# Patient Record
Sex: FEMALE | Race: WHITE | NOT HISPANIC OR LATINO | Employment: OTHER | URBAN - METROPOLITAN AREA
[De-identification: names, ages, dates, MRNs, and addresses within clinical notes are randomized per-mention and may not be internally consistent; named-entity substitution may affect disease eponyms.]

---

## 2024-01-23 ENCOUNTER — HOSPITAL ENCOUNTER (EMERGENCY)
Facility: HOSPITAL | Age: 74
Discharge: HOME | End: 2024-01-23
Payer: MEDICARE

## 2024-01-23 VITALS
RESPIRATION RATE: 18 BRPM | HEART RATE: 101 BPM | DIASTOLIC BLOOD PRESSURE: 61 MMHG | TEMPERATURE: 98 F | SYSTOLIC BLOOD PRESSURE: 157 MMHG

## 2024-01-23 DIAGNOSIS — R06.02 SHORTNESS OF BREATH: ICD-10-CM

## 2024-01-23 PROCEDURE — 99900041 HC LEFT WITHOUT BEING SEEN- EMERGENCY

## 2024-07-13 ENCOUNTER — HOSPITAL ENCOUNTER (INPATIENT)
Facility: HOSPITAL | Age: 74
LOS: 13 days | Discharge: NURSING, REHAB OR LONG-TERM CARE | DRG: 242 | End: 2024-07-26
Attending: EMERGENCY MEDICINE | Admitting: HOSPITALIST
Payer: MEDICARE

## 2024-07-13 DIAGNOSIS — I50.9 CHF (CONGESTIVE HEART FAILURE): ICD-10-CM

## 2024-07-13 DIAGNOSIS — I45.89 AV DISSOCIATION: ICD-10-CM

## 2024-07-13 DIAGNOSIS — I44.1 AV BLOCK, MOBITZ II: ICD-10-CM

## 2024-07-13 DIAGNOSIS — R06.02 SOB (SHORTNESS OF BREATH): Primary | ICD-10-CM

## 2024-07-13 DIAGNOSIS — I10 PRIMARY HYPERTENSION: ICD-10-CM

## 2024-07-13 DIAGNOSIS — I50.22 HEART FAILURE WITH MILDLY REDUCED EJECTION FRACTION (HFMREF): ICD-10-CM

## 2024-07-13 DIAGNOSIS — I50.41 ACUTE COMBINED SYSTOLIC AND DIASTOLIC CONGESTIVE HEART FAILURE: ICD-10-CM

## 2024-07-13 DIAGNOSIS — I50.43 ACUTE ON CHRONIC COMBINED SYSTOLIC AND DIASTOLIC CONGESTIVE HEART FAILURE: ICD-10-CM

## 2024-07-13 DIAGNOSIS — I50.9 CONGESTIVE HEART FAILURE, UNSPECIFIED HF CHRONICITY, UNSPECIFIED HEART FAILURE TYPE: ICD-10-CM

## 2024-07-13 DIAGNOSIS — R00.1 BRADYCARDIA: ICD-10-CM

## 2024-07-13 DIAGNOSIS — I50.9 ACUTE ON CHRONIC CONGESTIVE HEART FAILURE, UNSPECIFIED HEART FAILURE TYPE: ICD-10-CM

## 2024-07-13 DIAGNOSIS — R09.02 HYPOXIA: ICD-10-CM

## 2024-07-13 DIAGNOSIS — R79.89 ELEVATED TROPONIN: ICD-10-CM

## 2024-07-13 DIAGNOSIS — I49.9 ARRHYTHMIA: ICD-10-CM

## 2024-07-13 LAB
ALBUMIN SERPL BCP-MCNC: 3.5 G/DL (ref 3.5–5.2)
ALP SERPL-CCNC: 132 U/L (ref 55–135)
ALT SERPL W/O P-5'-P-CCNC: 47 U/L (ref 10–44)
ANION GAP SERPL CALC-SCNC: 12 MMOL/L (ref 8–16)
AST SERPL-CCNC: 80 U/L (ref 10–40)
BACTERIA #/AREA URNS HPF: NORMAL /HPF
BASOPHILS # BLD AUTO: 0.05 K/UL (ref 0–0.2)
BASOPHILS NFR BLD: 0.8 % (ref 0–1.9)
BILIRUB SERPL-MCNC: 1.3 MG/DL (ref 0.1–1)
BILIRUB UR QL STRIP: NEGATIVE
BNP SERPL-MCNC: 2367 PG/ML (ref 0–99)
BUN SERPL-MCNC: 32 MG/DL (ref 8–23)
CALCIUM SERPL-MCNC: 9.1 MG/DL (ref 8.7–10.5)
CHLORIDE SERPL-SCNC: 111 MMOL/L (ref 95–110)
CLARITY UR: CLEAR
CO2 SERPL-SCNC: 19 MMOL/L (ref 23–29)
COLOR UR: COLORLESS
CREAT SERPL-MCNC: 1 MG/DL (ref 0.5–1.4)
D DIMER PPP IA.FEU-MCNC: 1.3 MG/L FEU
DIFFERENTIAL METHOD BLD: ABNORMAL
EOSINOPHIL # BLD AUTO: 0.1 K/UL (ref 0–0.5)
EOSINOPHIL NFR BLD: 1.3 % (ref 0–8)
ERYTHROCYTE [DISTWIDTH] IN BLOOD BY AUTOMATED COUNT: 13 % (ref 11.5–14.5)
EST. GFR  (NO RACE VARIABLE): 59 ML/MIN/1.73 M^2
GLUCOSE SERPL-MCNC: 191 MG/DL (ref 70–110)
GLUCOSE UR QL STRIP: NEGATIVE
HCT VFR BLD AUTO: 40.2 % (ref 37–48.5)
HGB BLD-MCNC: 13.5 G/DL (ref 12–16)
HGB UR QL STRIP: ABNORMAL
HYALINE CASTS #/AREA URNS LPF: 0 /LPF
IMM GRANULOCYTES # BLD AUTO: 0.03 K/UL (ref 0–0.04)
IMM GRANULOCYTES NFR BLD AUTO: 0.5 % (ref 0–0.5)
INFLUENZA A, MOLECULAR: NEGATIVE
INFLUENZA B, MOLECULAR: NEGATIVE
KETONES UR QL STRIP: NEGATIVE
LACTATE SERPL-SCNC: 2.2 MMOL/L (ref 0.5–2.2)
LEUKOCYTE ESTERASE UR QL STRIP: NEGATIVE
LYMPHOCYTES # BLD AUTO: 1.7 K/UL (ref 1–4.8)
LYMPHOCYTES NFR BLD: 27.4 % (ref 18–48)
MAGNESIUM SERPL-MCNC: 2.3 MG/DL (ref 1.6–2.6)
MCH RBC QN AUTO: 32.7 PG (ref 27–31)
MCHC RBC AUTO-ENTMCNC: 33.6 G/DL (ref 32–36)
MCV RBC AUTO: 97 FL (ref 82–98)
MICROSCOPIC COMMENT: NORMAL
MONOCYTES # BLD AUTO: 0.4 K/UL (ref 0.3–1)
MONOCYTES NFR BLD: 6.7 % (ref 4–15)
NEUTROPHILS # BLD AUTO: 3.9 K/UL (ref 1.8–7.7)
NEUTROPHILS NFR BLD: 63.3 % (ref 38–73)
NITRITE UR QL STRIP: NEGATIVE
NRBC BLD-RTO: 0 /100 WBC
PH UR STRIP: 5 [PH] (ref 5–8)
PHOSPHATE SERPL-MCNC: 5.7 MG/DL (ref 2.7–4.5)
PLATELET # BLD AUTO: 187 K/UL (ref 150–450)
PMV BLD AUTO: 10.8 FL (ref 9.2–12.9)
POTASSIUM SERPL-SCNC: 4.1 MMOL/L (ref 3.5–5.1)
PROT SERPL-MCNC: 6.5 G/DL (ref 6–8.4)
PROT UR QL STRIP: ABNORMAL
RBC # BLD AUTO: 4.13 M/UL (ref 4–5.4)
RBC #/AREA URNS HPF: 0 /HPF (ref 0–4)
SARS-COV-2 RDRP RESP QL NAA+PROBE: NEGATIVE
SODIUM SERPL-SCNC: 142 MMOL/L (ref 136–145)
SP GR UR STRIP: 1.01 (ref 1–1.03)
SPECIMEN SOURCE: NORMAL
TROPONIN I SERPL DL<=0.01 NG/ML-MCNC: 0.09 NG/ML (ref 0–0.03)
URN SPEC COLLECT METH UR: ABNORMAL
UROBILINOGEN UR STRIP-ACNC: NEGATIVE EU/DL
WBC # BLD AUTO: 6.1 K/UL (ref 3.9–12.7)
WBC #/AREA URNS HPF: 0 /HPF (ref 0–5)

## 2024-07-13 PROCEDURE — 93005 ELECTROCARDIOGRAM TRACING: CPT

## 2024-07-13 PROCEDURE — 87040 BLOOD CULTURE FOR BACTERIA: CPT | Mod: 59 | Performed by: EMERGENCY MEDICINE

## 2024-07-13 PROCEDURE — 83735 ASSAY OF MAGNESIUM: CPT | Performed by: EMERGENCY MEDICINE

## 2024-07-13 PROCEDURE — 84100 ASSAY OF PHOSPHORUS: CPT | Performed by: EMERGENCY MEDICINE

## 2024-07-13 PROCEDURE — 27000221 HC OXYGEN, UP TO 24 HOURS

## 2024-07-13 PROCEDURE — 84484 ASSAY OF TROPONIN QUANT: CPT | Performed by: EMERGENCY MEDICINE

## 2024-07-13 PROCEDURE — 96374 THER/PROPH/DIAG INJ IV PUSH: CPT

## 2024-07-13 PROCEDURE — 93010 ELECTROCARDIOGRAM REPORT: CPT | Mod: ,,, | Performed by: INTERNAL MEDICINE

## 2024-07-13 PROCEDURE — 80053 COMPREHEN METABOLIC PANEL: CPT | Performed by: EMERGENCY MEDICINE

## 2024-07-13 PROCEDURE — 87502 INFLUENZA DNA AMP PROBE: CPT | Performed by: EMERGENCY MEDICINE

## 2024-07-13 PROCEDURE — 83605 ASSAY OF LACTIC ACID: CPT | Performed by: EMERGENCY MEDICINE

## 2024-07-13 PROCEDURE — 83880 ASSAY OF NATRIURETIC PEPTIDE: CPT | Performed by: EMERGENCY MEDICINE

## 2024-07-13 PROCEDURE — 85379 FIBRIN DEGRADATION QUANT: CPT | Performed by: EMERGENCY MEDICINE

## 2024-07-13 PROCEDURE — 81000 URINALYSIS NONAUTO W/SCOPE: CPT | Performed by: EMERGENCY MEDICINE

## 2024-07-13 PROCEDURE — U0002 COVID-19 LAB TEST NON-CDC: HCPCS | Performed by: EMERGENCY MEDICINE

## 2024-07-13 PROCEDURE — 63600175 PHARM REV CODE 636 W HCPCS: Performed by: EMERGENCY MEDICINE

## 2024-07-13 PROCEDURE — 25500020 PHARM REV CODE 255: Performed by: EMERGENCY MEDICINE

## 2024-07-13 PROCEDURE — 11000001 HC ACUTE MED/SURG PRIVATE ROOM

## 2024-07-13 PROCEDURE — 99900035 HC TECH TIME PER 15 MIN (STAT)

## 2024-07-13 PROCEDURE — 85025 COMPLETE CBC W/AUTO DIFF WBC: CPT | Performed by: EMERGENCY MEDICINE

## 2024-07-13 PROCEDURE — 99291 CRITICAL CARE FIRST HOUR: CPT

## 2024-07-13 RX ORDER — POLYETHYLENE GLYCOL 3350 17 G/17G
17 POWDER, FOR SOLUTION ORAL DAILY
Status: DISCONTINUED | OUTPATIENT
Start: 2024-07-14 | End: 2024-07-26 | Stop reason: HOSPADM

## 2024-07-13 RX ORDER — HYDROCHLOROTHIAZIDE 12.5 MG/1
1 CAPSULE ORAL EVERY MORNING
Status: ON HOLD | COMMUNITY
Start: 2024-06-27 | End: 2024-07-26 | Stop reason: HOSPADM

## 2024-07-13 RX ORDER — ENOXAPARIN SODIUM 100 MG/ML
40 INJECTION SUBCUTANEOUS EVERY 24 HOURS
Status: DISCONTINUED | OUTPATIENT
Start: 2024-07-14 | End: 2024-07-24

## 2024-07-13 RX ORDER — FUROSEMIDE 10 MG/ML
40 INJECTION INTRAMUSCULAR; INTRAVENOUS
Status: DISCONTINUED | OUTPATIENT
Start: 2024-07-13 | End: 2024-07-13

## 2024-07-13 RX ORDER — SODIUM CHLORIDE 0.9 % (FLUSH) 0.9 %
10 SYRINGE (ML) INJECTION
Status: DISCONTINUED | OUTPATIENT
Start: 2024-07-14 | End: 2024-07-26 | Stop reason: HOSPADM

## 2024-07-13 RX ORDER — AMOXICILLIN 250 MG
1 CAPSULE ORAL 2 TIMES DAILY
Status: DISCONTINUED | OUTPATIENT
Start: 2024-07-14 | End: 2024-07-26 | Stop reason: HOSPADM

## 2024-07-13 RX ORDER — FUROSEMIDE 10 MG/ML
40 INJECTION INTRAMUSCULAR; INTRAVENOUS
Status: COMPLETED | OUTPATIENT
Start: 2024-07-13 | End: 2024-07-13

## 2024-07-13 RX ORDER — ONDANSETRON HYDROCHLORIDE 2 MG/ML
4 INJECTION, SOLUTION INTRAVENOUS EVERY 8 HOURS PRN
Status: DISCONTINUED | OUTPATIENT
Start: 2024-07-14 | End: 2024-07-26 | Stop reason: HOSPADM

## 2024-07-13 RX ADMIN — FUROSEMIDE 40 MG: 10 INJECTION, SOLUTION INTRAMUSCULAR; INTRAVENOUS at 06:07

## 2024-07-13 RX ADMIN — IOHEXOL 100 ML: 350 INJECTION, SOLUTION INTRAVENOUS at 07:07

## 2024-07-13 NOTE — Clinical Note
32 ml of contrast were injected throughout the case. 0 mL of contrast was the total wasted during the case. 32 mL was the total amount used during the case.

## 2024-07-13 NOTE — ED PROVIDER NOTES
SCRIBE #1 NOTE: I, Amairani Navarro, am scribing for, and in the presence of, John Dalton MD. I have scribed the entire note.       History     Chief Complaint   Patient presents with    Shortness of Breath     Pt with a hx of pulmonary edema and unspecified cardiac hx arrived in the ED via [facility] on 15L O2 via NRB with c/o SOB and BLE edema x last couple days. Pt reports swelling has worsened today. +4 pitting edema noted to BLE. Pt denies home O2, CHF, blood thinners, or diuretics. [facility] medic reports FD was the first on scene and pt's O2 saturation was in the 80s on RA.      Review of patient's allergies indicates:  No Known Allergies      History of Present Illness     HPI    7/13/2024, 6:04 PM  History obtained from the patient and [facility]      History of Present Illness: Kaleigh Delgado is a 74 y.o. female patient with a PMHx of HTN, pulmonary edema, and bundle branch block who presents to the Emergency Department for evaluation of SOB and leg swelling which onset gradually two days ago with no hx of CHF. Pt reports sxs worsening PTA and calling [facility]. [facility] reports FD recorded O2 sat 88 on RA and BP as 115/78. On [facility] arrival they gave the pt 6L NC, but then switched to 15L on NRB. Pt denies any home O2, blood thinners, or diuretics. Symptoms are constant and moderate in severity. No mitigating or exacerbating factors reported. Patient denies any fever, CP, n/v, rash, and all other sxs at this time. No further complaints or concerns at this time.       Arrival mode: [facility]    PCP: No, Primary Doctor        Past Medical History:  History reviewed. No pertinent past medical history.    Past Surgical History:  Past Surgical History:   Procedure Laterality Date    exploratory lap      FIRST RIB REMOVAL      KNEE SURGERY      TONSILLECTOMY      VARICOSE VEIN SURGERY           Family History:  Family History   Family history unknown: Yes       Social History:  Social History     Tobacco Use    Smoking status: Never     Smokeless tobacco: Not on file   Substance and Sexual Activity    Alcohol use: No    Drug use: No    Sexual activity: Not on file        Review of Systems     Review of Systems   Constitutional:  Negative for fever.   HENT:  Negative for sore throat.    Respiratory:  Positive for shortness of breath.    Cardiovascular:  Positive for leg swelling (bilateral). Negative for chest pain.   Gastrointestinal:  Negative for nausea and vomiting.   Genitourinary:  Negative for dysuria.   Musculoskeletal:  Negative for back pain.   Skin:  Negative for rash.   Neurological:  Negative for weakness.   Hematological:  Does not bruise/bleed easily.   All other systems reviewed and are negative.       Physical Exam     Initial Vitals   BP Pulse Resp Temp SpO2   07/13/24 1804 07/13/24 1804 07/13/24 1802 07/13/24 1804 07/13/24 1802   115/78 64 (!) 28 98.2 °F (36.8 °C) (!) 93 %      MAP       --                 Physical Exam  Nursing Notes and Vital Signs Reviewed.  Constitutional: Patient is in mild distress. Well-developed and well-nourished.  Head: Atraumatic. Normocephalic.  Eyes: PERRL. EOM intact. Conjunctivae are not pale. No scleral icterus.  ENT: Mucous membranes are moist. Oropharynx is clear and symmetric.    Neck: Supple. Full ROM. No lymphadenopathy.  Cardiovascular: Regular rate. Regular rhythm. No murmurs, rubs, or gallops. Distal pulses are 2+ and symmetric.  Pulmonary/Chest: Diffuse crackles. No wheezing or rales.  Abdominal: Soft and non-distended.  There is no tenderness.  No rebound, guarding, or rigidity. Good bowel sounds.  Genitourinary: No CVA tenderness  Musculoskeletal: Moves all extremities. No obvious deformities. 4+ edema. No calf tenderness.  Skin: Warm and dry.  Neurological:  Alert, awake, and appropriate.  Normal speech.  No acute focal neurological deficits are appreciated.  Psychiatric: Normal affect. Good eye contact. Appropriate in content.     ED Course   Critical Care    Date/Time: 7/13/2024 8:17  "PM    Performed by: oJhn Dalton MD  Authorized by: John Dalton MD  Direct patient critical care time: 15 minutes  Additional history critical care time: 10 minutes  Ordering / reviewing critical care time: 5 minutes  Documentation critical care time: 6 minutes  Consulting other physicians critical care time: 4 minutes  Consult with family critical care time: 2 minutes  Other critical care time: 3 minutes  Total critical care time (exclusive of procedural time) : 45 minutes  Critical care time was exclusive of separately billable procedures and treating other patients and teaching time.  Critical care was necessary to treat or prevent imminent or life-threatening deterioration of the following conditions: Acute hypoxemic respiratory failure.  Critical care was time spent personally by me on the following activities: blood draw for specimens, development of treatment plan with patient or surrogate, discussions with consultants, interpretation of cardiac output measurements, evaluation of patient's response to treatment, examination of patient, obtaining history from patient or surrogate, ordering and performing treatments and interventions, ordering and review of laboratory studies, ordering and review of radiographic studies, pulse oximetry, re-evaluation of patient's condition and review of old charts.        ED Vital Signs:  Vitals:    07/13/24 1802 07/13/24 1804 07/13/24 1814 07/13/24 2043   BP:  115/78  (!) 173/71   Pulse:  64 (!) 49 (!) 42   Resp: (!) 28 (!) 33     Temp:  98.2 °F (36.8 °C)     TempSrc:  Oral     SpO2: (!) 93% 99%  98%   Weight:       Height:        07/13/24 2054 07/13/24 2110 07/13/24 2232 07/13/24 2351   BP:   (!) 182/72    Pulse: (!) 59  (!) 45    Resp: 20  18    Temp: 98.8 °F (37.1 °C)  98.2 °F (36.8 °C)    TempSrc: Oral      SpO2: 96%  98%    Weight:  70.2 kg (154 lb 12.2 oz)  70.2 kg (154 lb 12.2 oz)   Height:  5' 10" (1.778 m)  5' 10" (1.778 m)    07/14/24 0006 07/14/24 0100   BP: " (!) 165/72    Pulse: (!) 42 (!) 43   Resp: 17    Temp: 98.1 °F (36.7 °C)    TempSrc:     SpO2: 98%    Weight:     Height:         Abnormal Lab Results:  Labs Reviewed   CBC W/ AUTO DIFFERENTIAL - Abnormal; Notable for the following components:       Result Value    MCH 32.7 (*)     All other components within normal limits   COMPREHENSIVE METABOLIC PANEL - Abnormal; Notable for the following components:    Chloride 111 (*)     CO2 19 (*)     Glucose 191 (*)     BUN 32 (*)     Total Bilirubin 1.3 (*)     AST 80 (*)     ALT 47 (*)     eGFR 59 (*)     All other components within normal limits   B-TYPE NATRIURETIC PEPTIDE - Abnormal; Notable for the following components:    BNP 2,367 (*)     All other components within normal limits   D DIMER, QUANTITATIVE - Abnormal; Notable for the following components:    D-Dimer 1.30 (*)     All other components within normal limits   TROPONIN I - Abnormal; Notable for the following components:    Troponin I 0.090 (*)     All other components within normal limits   URINALYSIS, REFLEX TO URINE CULTURE - Abnormal; Notable for the following components:    Color, UA Colorless (*)     Protein, UA 1+ (*)     Occult Blood UA 1+ (*)     All other components within normal limits    Narrative:     Specimen Source->Urine   PHOSPHORUS - Abnormal; Notable for the following components:    Phosphorus 5.7 (*)     All other components within normal limits   INFLUENZA A & B BY MOLECULAR   MAGNESIUM   LACTIC ACID, PLASMA   SARS-COV-2 RNA AMPLIFICATION, QUAL   URINALYSIS MICROSCOPIC    Narrative:     Specimen Source->Urine        All Lab Results:  Results for orders placed or performed during the hospital encounter of 07/13/24   Influenza A & B by Molecular    Specimen: Nasopharyngeal Swab   Result Value Ref Range    Influenza A, Molecular Negative Negative    Influenza B, Molecular Negative Negative    Flu A & B Source NP    CBC auto differential   Result Value Ref Range    WBC 6.10 3.90 - 12.70 K/uL     RBC 4.13 4.00 - 5.40 M/uL    Hemoglobin 13.5 12.0 - 16.0 g/dL    Hematocrit 40.2 37.0 - 48.5 %    MCV 97 82 - 98 fL    MCH 32.7 (H) 27.0 - 31.0 pg    MCHC 33.6 32.0 - 36.0 g/dL    RDW 13.0 11.5 - 14.5 %    Platelets 187 150 - 450 K/uL    MPV 10.8 9.2 - 12.9 fL    Immature Granulocytes 0.5 0.0 - 0.5 %    Gran # (ANC) 3.9 1.8 - 7.7 K/uL    Immature Grans (Abs) 0.03 0.00 - 0.04 K/uL    Lymph # 1.7 1.0 - 4.8 K/uL    Mono # 0.4 0.3 - 1.0 K/uL    Eos # 0.1 0.0 - 0.5 K/uL    Baso # 0.05 0.00 - 0.20 K/uL    nRBC 0 0 /100 WBC    Gran % 63.3 38.0 - 73.0 %    Lymph % 27.4 18.0 - 48.0 %    Mono % 6.7 4.0 - 15.0 %    Eosinophil % 1.3 0.0 - 8.0 %    Basophil % 0.8 0.0 - 1.9 %    Differential Method Automated    Comprehensive metabolic panel   Result Value Ref Range    Sodium 142 136 - 145 mmol/L    Potassium 4.1 3.5 - 5.1 mmol/L    Chloride 111 (H) 95 - 110 mmol/L    CO2 19 (L) 23 - 29 mmol/L    Glucose 191 (H) 70 - 110 mg/dL    BUN 32 (H) 8 - 23 mg/dL    Creatinine 1.0 0.5 - 1.4 mg/dL    Calcium 9.1 8.7 - 10.5 mg/dL    Total Protein 6.5 6.0 - 8.4 g/dL    Albumin 3.5 3.5 - 5.2 g/dL    Total Bilirubin 1.3 (H) 0.1 - 1.0 mg/dL    Alkaline Phosphatase 132 55 - 135 U/L    AST 80 (H) 10 - 40 U/L    ALT 47 (H) 10 - 44 U/L    eGFR 59 (A) >60 mL/min/1.73 m^2    Anion Gap 12 8 - 16 mmol/L   Brain natriuretic peptide   Result Value Ref Range    BNP 2,367 (H) 0 - 99 pg/mL   D dimer, quantitative   Result Value Ref Range    D-Dimer 1.30 (H) <0.50 mg/L FEU   Troponin I   Result Value Ref Range    Troponin I 0.090 (H) 0.000 - 0.026 ng/mL   Urinalysis, Reflex to Urine Culture Urine, Clean Catch    Specimen: Urine, Clean Catch   Result Value Ref Range    Specimen UA Urine, Clean Catch     Color, UA Colorless (A) Yellow, Straw, Brittny    Appearance, UA Clear Clear    pH, UA 5.0 5.0 - 8.0    Specific Gravity, UA 1.010 1.005 - 1.030    Protein, UA 1+ (A) Negative    Glucose, UA Negative Negative    Ketones, UA Negative Negative    Bilirubin (UA)  Negative Negative    Occult Blood UA 1+ (A) Negative    Nitrite, UA Negative Negative    Urobilinogen, UA Negative <2.0 EU/dL    Leukocytes, UA Negative Negative   Magnesium   Result Value Ref Range    Magnesium 2.3 1.6 - 2.6 mg/dL   Phosphorus   Result Value Ref Range    Phosphorus 5.7 (H) 2.7 - 4.5 mg/dL   Lactic acid, plasma   Result Value Ref Range    Lactate (Lactic Acid) 2.2 0.5 - 2.2 mmol/L   COVID-19 Rapid Screening   Result Value Ref Range    SARS-CoV-2 RNA, Amplification, Qual Negative Negative   Urinalysis Microscopic   Result Value Ref Range    RBC, UA 0 0 - 4 /hpf    WBC, UA 0 0 - 5 /hpf    Bacteria Rare None-Occ /hpf    Hyaline Casts, UA 0 0-1/lpf /lpf    Microscopic Comment SEE COMMENT    TSH   Result Value Ref Range    TSH 2.123 0.400 - 4.000 uIU/mL         Imaging Results:  Imaging Results              CTA Chest Non-Coronary (PE Studies) (Final result)  Result time 07/13/24 20:07:44      Final result by Jose Pritchard MD (07/13/24 20:07:44)                   Impression:      Cardiomegaly.  Mild moderate pulmonary edema.  Mild left-sided pleural effusion.  Small right-sided pleural effusion.  Correlate clinically to CHF.  No evidence for pulmonary emboli.  Atypical infectious process not excluded.    All CT scans   are performed using dose optimization techniques including the following: automated exposure control; adjustment of the mA and/or kV; use of iterative reconstruction technique.  Dose modulation was employed for ALARA by means of: Automated exposure control; adjustment of the mA and/or kV according to patient size (this includes techniques or standardized protocols for targeted exams where dose is matched to indication/reason for exam; i.e. extremities or head); and/or use of iterative reconstructive technique.      Electronically signed by: Jose Pritchard  Date:    07/13/2024  Time:    20:07               Narrative:    EXAMINATION:  CTA CHEST NON CORONARY (PE STUDIES)    CLINICAL  HISTORY:  Pulmonary embolism (PE) suspected, positive D-dimer;    TECHNIQUE:  Low dose axial images, sagittal and coronal reformations were obtained from the thoracic inlet to the lung bases following the IV administration of 100 mL of Omnipaque 350.  Contrast timing was optimized to evaluate the pulmonary arteries.  MIP images were performed.    COMPARISON:  None    FINDINGS:  Mild left-sided pleural effusion.  Cardiomegaly.  No pulmonary emboli.  Subpleural ground-glass opacities.  Bronchial vascular thickening along the hilar regions.  Findings suggestive of pulmonary edema and CHF.  No evidence for dissection aneurysm.  Mild loculated right anterior pleural effusion.  Degenerative joint disease.  Main pulmonary artery is mildly enlarged.                                       X-Ray Chest AP Portable (Final result)  Result time 07/13/24 18:35:39      Final result by Jose Pritchard MD (07/13/24 18:35:39)                   Impression:      As above      Electronically signed by: Jose Pritchard  Date:    07/13/2024  Time:    18:35               Narrative:    EXAMINATION:  XR CHEST AP PORTABLE    CLINICAL HISTORY:  SOB;    TECHNIQUE:  Single frontal view of the chest was performed.    COMPARISON:  None    FINDINGS:  Cardiomegaly with perihilar edema.  Correlate clinically to CHF.  Trace pleural effusions.  Correlate clinically to CHF.    Bones are intact.                                       The EKG was ordered, reviewed, and independently interpreted by the ED provider.  Interpretation time: 18:00  Rate: 55 BPM  Rhythm:  Sinus bradycardia with 1st degree AV block with premature atrial complexes wth aberrant conduction with ventricular escape complexes  Interpretation: Nonspecific intraventricular block. Right ventricular hypertrophy. T wave abnormality, consider inferior ischemia. No STEMI.  No prior EKGs for comparison.            The Emergency Provider reviewed the vital signs and test results, which are outlined  above.     ED Discussion            Medical Decision Making  Amount and/or Complexity of Data Reviewed  Labs: ordered. Decision-making details documented in ED Course.  Radiology: ordered. Decision-making details documented in ED Course.  ECG/medicine tests: ordered and independent interpretation performed. Decision-making details documented in ED Course.  Discussion of management or test interpretation with external provider(s): 8:28 PM: Discussed case with Dr. Yang ([facility]). Dr. Yang agrees with current care and management of pt and accepts admission.   Admitting Service:   Admitting Physician: Dr. Yang  Admit to: Inpt med/tele    8:28 PM: Re-evaluated pt. I have discussed test results, shared treatment plan, and the need for admission with patient and family at bedside. Pt and family express understanding at this time and agree with all information. All questions answered. Pt and family have no further questions or concerns at this time. Pt is ready for admit.    Risk  Prescription drug management.  Decision regarding hospitalization.                ED Medication(s):  Medications   sodium chloride 0.9% flush 10 mL (has no administration in time range)   enoxaparin injection 40 mg (has no administration in time range)   ondansetron injection 4 mg (has no administration in time range)   polyethylene glycol packet 17 g (has no administration in time range)   senna-docusate 8.6-50 mg per tablet 1 tablet (has no administration in time range)   furosemide injection 40 mg (40 mg Intravenous Given 7/13/24 1811)   iohexoL (OMNIPAQUE 350) injection 100 mL (100 mLs Intravenous Given 7/13/24 1944)       Current Discharge Medication List                  Scribe Attestation:   Scribe #1: I performed the above scribed service and the documentation accurately describes the services I performed. I attest to the accuracy of the note.     Attending:   Physician Attestation Statement for Scribe #1: Sha PACHECO  MD John, personally performed the services described in this documentation, as scribed by Amairani Navarro, in my presence, and it is both accurate and complete.           Clinical Impression       ICD-10-CM ICD-9-CM   1. SOB (shortness of breath)  R06.02 786.05   2. Acute on chronic congestive heart failure, unspecified heart failure type  I50.9 428.0   3. Hypoxia  R09.02 799.02       Disposition:   Disposition: Admitted  Condition: John Knox MD  07/14/24 0126

## 2024-07-13 NOTE — Clinical Note
A venogram was performed in the coronary sinus. A balloon was inserted. The vessel was injected via hand injection  with 12 mL of contrast. The balloon was removed.

## 2024-07-13 NOTE — Clinical Note
Diagnosis: SOB (shortness of breath) [085090]   Future Attending Provider: PIETER BARROSO [[id number]]   Reason for IP Medical Treatment  (Clinical interventions that can only be accomplished in the IP setting? ) :: Heart failure   I certify that Inpatient services for greater than or equal to 2 midnights are medically necessary:: No   Plans for Post-Acute care--if anticipated (pick the single best option):: A. No post acute care anticipated at this time   Special Needs:: No Special Needs [1]

## 2024-07-13 NOTE — Clinical Note
A percutaneous stick was made to the left axillary vein. Hydroxychloroquine Pregnancy And Lactation Text: This medication has been shown to cause fetal harm but it isn't assigned a Pregnancy Risk Category. There are small amounts excreted in breast milk.

## 2024-07-14 PROBLEM — R00.1 BRADYCARDIA: Status: ACTIVE | Noted: 2024-07-14

## 2024-07-14 PROBLEM — I10 HYPERTENSION: Status: ACTIVE | Noted: 2024-07-14

## 2024-07-14 PROBLEM — J96.01 ACUTE HYPOXEMIC RESPIRATORY FAILURE: Status: ACTIVE | Noted: 2024-07-14

## 2024-07-14 LAB
ALLENS TEST: ABNORMAL
ANION GAP SERPL CALC-SCNC: 13 MMOL/L (ref 8–16)
AV MEAN GRADIENT: 11 MMHG
AV PEAK GRADIENT: 20 MMHG
AV REGURGITATION PRESSURE HALF TIME: 484.25 MS
BSA FOR ECHO PROCEDURE: 1.86 M2
BUN SERPL-MCNC: 30 MG/DL (ref 8–23)
CALCIUM SERPL-MCNC: 9.2 MG/DL (ref 8.7–10.5)
CHLORIDE SERPL-SCNC: 108 MMOL/L (ref 95–110)
CO2 SERPL-SCNC: 21 MMOL/L (ref 23–29)
CREAT SERPL-MCNC: 0.8 MG/DL (ref 0.5–1.4)
CV ECHO LV RWT: 0.7 CM
DELSYS: ABNORMAL
DOP CALC AO PEAK VEL: 2.25 M/S
DOP CALC AO VTI: 59.7 CM
DOP CALC LVOT AREA: 3.4 CM2
DOP CALC LVOT DIAMETER: 2.07 CM
E WAVE DECELERATION TIME: 256.49 MSEC
E/A RATIO: 1.2
E/E' RATIO: 10 M/S
ECHO LV POSTERIOR WALL: 1.47 CM (ref 0.6–1.1)
EJECTION FRACTION: 55 %
EST. GFR  (NO RACE VARIABLE): >60 ML/MIN/1.73 M^2
ESTIMATED AVG GLUCOSE: 105 MG/DL (ref 68–131)
FLOW: 2
FRACTIONAL SHORTENING: 19 % (ref 28–44)
GLUCOSE SERPL-MCNC: 100 MG/DL (ref 70–110)
HBA1C MFR BLD: 5.3 % (ref 4–5.6)
HCO3 UR-SCNC: 22.5 MMOL/L (ref 24–28)
INTERVENTRICULAR SEPTUM: 1.53 CM (ref 0.6–1.1)
IVC DIAMETER: 1.08 CM
IVRT: 87.54 MSEC
LA MAJOR: 7.9 CM
LA MINOR: 6.92 CM
LEFT ATRIUM SIZE: 4.89 CM
LEFT INTERNAL DIMENSION IN SYSTOLE: 3.4 CM (ref 2.1–4)
LEFT VENTRICLE DIASTOLIC VOLUME INDEX: 41.97 ML/M2
LEFT VENTRICLE DIASTOLIC VOLUME: 78.48 ML
LEFT VENTRICLE MASS INDEX: 133 G/M2
LEFT VENTRICLE SYSTOLIC VOLUME INDEX: 25.4 ML/M2
LEFT VENTRICLE SYSTOLIC VOLUME: 47.52 ML
LEFT VENTRICULAR INTERNAL DIMENSION IN DIASTOLE: 4.2 CM (ref 3.5–6)
LEFT VENTRICULAR MASS: 249.5 G
LV LATERAL E/E' RATIO: 11.11 M/S
LV SEPTAL E/E' RATIO: 9.09 M/S
LVED V (TEICH): 78.48 ML
LVES V (TEICH): 47.52 ML
MODE: ABNORMAL
MV PEAK A VEL: 0.83 M/S
MV PEAK E VEL: 1 M/S
MV STENOSIS PRESSURE HALF TIME: 74.38 MS
MV VALVE AREA P 1/2 METHOD: 2.96 CM2
PCO2 BLDA: 32.4 MMHG (ref 35–45)
PH SMN: 7.45 [PH] (ref 7.35–7.45)
PISA AR MAX VEL: 4.16 M/S
PISA TR MAX VEL: 3.24 M/S
PO2 BLDA: 72 MMHG (ref 80–100)
POC BE: -1 MMOL/L
POC SATURATED O2: 95 % (ref 95–100)
POTASSIUM SERPL-SCNC: 4 MMOL/L (ref 3.5–5.1)
PV MV: 0.91 M/S
PV PEAK GRADIENT: 8 MMHG
PV PEAK VELOCITY: 1.41 M/S
RA MAJOR: 6.58 CM
RA PRESSURE ESTIMATED: 3 MMHG
RV TB RVSP: 6 MMHG
SAMPLE: ABNORMAL
SITE: ABNORMAL
SODIUM SERPL-SCNC: 142 MMOL/L (ref 136–145)
STJ: 3 CM
TDI LATERAL: 0.09 M/S
TDI SEPTAL: 0.11 M/S
TDI: 0.1 M/S
TR MAX PG: 42 MMHG
TROPONIN I SERPL DL<=0.01 NG/ML-MCNC: 0.31 NG/ML (ref 0–0.03)
TSH SERPL DL<=0.005 MIU/L-ACNC: 2.12 UIU/ML (ref 0.4–4)
TV REST PULMONARY ARTERY PRESSURE: 45 MMHG
Z-SCORE OF LEFT VENTRICULAR DIMENSION IN END DIASTOLE: -2.16
Z-SCORE OF LEFT VENTRICULAR DIMENSION IN END SYSTOLE: 0.44

## 2024-07-14 PROCEDURE — 36415 COLL VENOUS BLD VENIPUNCTURE: CPT | Mod: XB

## 2024-07-14 PROCEDURE — 63600175 PHARM REV CODE 636 W HCPCS

## 2024-07-14 PROCEDURE — 80048 BASIC METABOLIC PNL TOTAL CA: CPT | Performed by: HOSPITALIST

## 2024-07-14 PROCEDURE — 93005 ELECTROCARDIOGRAM TRACING: CPT

## 2024-07-14 PROCEDURE — 93010 ELECTROCARDIOGRAM REPORT: CPT | Mod: ,,, | Performed by: INTERNAL MEDICINE

## 2024-07-14 PROCEDURE — 84484 ASSAY OF TROPONIN QUANT: CPT

## 2024-07-14 PROCEDURE — 25000003 PHARM REV CODE 250

## 2024-07-14 PROCEDURE — 36600 WITHDRAWAL OF ARTERIAL BLOOD: CPT

## 2024-07-14 PROCEDURE — 83036 HEMOGLOBIN GLYCOSYLATED A1C: CPT | Performed by: HOSPITALIST

## 2024-07-14 PROCEDURE — 82803 BLOOD GASES ANY COMBINATION: CPT

## 2024-07-14 PROCEDURE — 25000003 PHARM REV CODE 250: Performed by: HOSPITALIST

## 2024-07-14 PROCEDURE — 36415 COLL VENOUS BLD VENIPUNCTURE: CPT | Performed by: HOSPITALIST

## 2024-07-14 PROCEDURE — 99222 1ST HOSP IP/OBS MODERATE 55: CPT | Mod: ,,, | Performed by: INTERNAL MEDICINE

## 2024-07-14 PROCEDURE — 94761 N-INVAS EAR/PLS OXIMETRY MLT: CPT

## 2024-07-14 PROCEDURE — 63600175 PHARM REV CODE 636 W HCPCS: Performed by: HOSPITALIST

## 2024-07-14 PROCEDURE — 99900035 HC TECH TIME PER 15 MIN (STAT)

## 2024-07-14 PROCEDURE — 11000001 HC ACUTE MED/SURG PRIVATE ROOM

## 2024-07-14 PROCEDURE — 84443 ASSAY THYROID STIM HORMONE: CPT | Performed by: HOSPITALIST

## 2024-07-14 PROCEDURE — 27000221 HC OXYGEN, UP TO 24 HOURS

## 2024-07-14 RX ORDER — FUROSEMIDE 10 MG/ML
20 INJECTION INTRAMUSCULAR; INTRAVENOUS ONCE
Status: COMPLETED | OUTPATIENT
Start: 2024-07-14 | End: 2024-07-14

## 2024-07-14 RX ORDER — IPRATROPIUM BROMIDE AND ALBUTEROL SULFATE 2.5; .5 MG/3ML; MG/3ML
3 SOLUTION RESPIRATORY (INHALATION) EVERY 6 HOURS PRN
Status: DISCONTINUED | OUTPATIENT
Start: 2024-07-14 | End: 2024-07-26 | Stop reason: HOSPADM

## 2024-07-14 RX ORDER — LOSARTAN POTASSIUM 50 MG/1
50 TABLET ORAL DAILY
Status: DISCONTINUED | OUTPATIENT
Start: 2024-07-15 | End: 2024-07-26 | Stop reason: HOSPADM

## 2024-07-14 RX ORDER — FUROSEMIDE 10 MG/ML
40 INJECTION INTRAMUSCULAR; INTRAVENOUS EVERY 12 HOURS
Status: COMPLETED | OUTPATIENT
Start: 2024-07-14 | End: 2024-07-15

## 2024-07-14 RX ORDER — HYDRALAZINE HYDROCHLORIDE 20 MG/ML
10 INJECTION INTRAMUSCULAR; INTRAVENOUS EVERY 6 HOURS PRN
Status: DISCONTINUED | OUTPATIENT
Start: 2024-07-14 | End: 2024-07-26 | Stop reason: HOSPADM

## 2024-07-14 RX ORDER — ACETAMINOPHEN 325 MG/1
650 TABLET ORAL EVERY 4 HOURS PRN
Status: DISCONTINUED | OUTPATIENT
Start: 2024-07-14 | End: 2024-07-24 | Stop reason: SDUPTHER

## 2024-07-14 RX ORDER — TALC
6 POWDER (GRAM) TOPICAL NIGHTLY PRN
Status: DISCONTINUED | OUTPATIENT
Start: 2024-07-14 | End: 2024-07-26 | Stop reason: HOSPADM

## 2024-07-14 RX ORDER — ALUMINUM HYDROXIDE, MAGNESIUM HYDROXIDE, AND SIMETHICONE 1200; 120; 1200 MG/30ML; MG/30ML; MG/30ML
30 SUSPENSION ORAL 4 TIMES DAILY PRN
Status: DISCONTINUED | OUTPATIENT
Start: 2024-07-14 | End: 2024-07-26 | Stop reason: HOSPADM

## 2024-07-14 RX ORDER — HYDROCODONE BITARTRATE AND ACETAMINOPHEN 5; 325 MG/1; MG/1
1 TABLET ORAL EVERY 6 HOURS PRN
Status: DISCONTINUED | OUTPATIENT
Start: 2024-07-14 | End: 2024-07-24 | Stop reason: SDUPTHER

## 2024-07-14 RX ADMIN — HYDRALAZINE HYDROCHLORIDE 10 MG: 20 INJECTION, SOLUTION INTRAMUSCULAR; INTRAVENOUS at 02:07

## 2024-07-14 RX ADMIN — ENOXAPARIN SODIUM 40 MG: 40 INJECTION SUBCUTANEOUS at 05:07

## 2024-07-14 RX ADMIN — FUROSEMIDE 40 MG: 10 INJECTION, SOLUTION INTRAMUSCULAR; INTRAVENOUS at 09:07

## 2024-07-14 RX ADMIN — FUROSEMIDE 20 MG: 10 INJECTION, SOLUTION INTRAMUSCULAR; INTRAVENOUS at 02:07

## 2024-07-14 RX ADMIN — SENNOSIDES AND DOCUSATE SODIUM 1 TABLET: 50; 8.6 TABLET ORAL at 08:07

## 2024-07-14 RX ADMIN — POLYETHYLENE GLYCOL 3350 17 G: 17 POWDER, FOR SOLUTION ORAL at 08:07

## 2024-07-14 RX ADMIN — HYDROCODONE BITARTRATE AND ACETAMINOPHEN 1 TABLET: 5; 325 TABLET ORAL at 09:07

## 2024-07-14 NOTE — SUBJECTIVE & OBJECTIVE
Interval History: SOB improving. Continue supplemental oxygen PRN. Reporting left groin pain radiating to her knee. PRN pain medication and XRAY left hip.    Review of Systems   Constitutional:  Positive for activity change and fatigue. Negative for appetite change, chills and fever.   HENT:  Negative for congestion, facial swelling, rhinorrhea, sinus pressure, sinus pain, sneezing, sore throat and trouble swallowing.    Eyes:  Negative for photophobia and visual disturbance.   Respiratory:  Positive for cough, chest tightness and shortness of breath. Negative for wheezing.    Cardiovascular:  Positive for leg swelling. Negative for chest pain and palpitations.   Gastrointestinal:  Negative for abdominal distention, abdominal pain, constipation, diarrhea, nausea and vomiting.   Endocrine: Negative for cold intolerance and heat intolerance.   Genitourinary:  Negative for difficulty urinating, dysuria, flank pain, frequency, hematuria and urgency.   Musculoskeletal:  Positive for gait problem and myalgias.   Skin:  Positive for color change (BLE discoloration). Negative for pallor, rash and wound.   Allergic/Immunologic: Negative for environmental allergies, food allergies and immunocompromised state.   Neurological:  Negative for dizziness, seizures, syncope, weakness, light-headedness, numbness and headaches.   Hematological:  Does not bruise/bleed easily.   Psychiatric/Behavioral:  Negative for agitation and confusion. The patient is not nervous/anxious.      Objective:     Vital Signs (Most Recent):  Temp: 98 °F (36.7 °C) (07/14/24 1342)  Pulse: (!) 44 (07/14/24 1342)  Resp: 18 (07/14/24 1342)  BP: (!) 170/110 (07/14/24 1342)  SpO2: (!) 94 % (07/14/24 1342) Vital Signs (24h Range):  Temp:  [98 °F (36.7 °C)-98.8 °F (37.1 °C)] 98 °F (36.7 °C)  Pulse:  [38-73] 44  Resp:  [17-33] 18  SpO2:  [92 %-99 %] 94 %  BP: (115-182)/() 170/110     Weight: 70.2 kg (154 lb 12.2 oz)  Body mass index is 22.21  kg/m².    Intake/Output Summary (Last 24 hours) at 2024 1551  Last data filed at 2024 1425  Gross per 24 hour   Intake 270 ml   Output 650 ml   Net -380 ml         Physical Exam  Constitutional:       General: She is in acute distress.      Appearance: She is not ill-appearing or toxic-appearing.   HENT:      Head: Normocephalic and atraumatic.      Right Ear: External ear normal.      Left Ear: External ear normal.      Nose: Nose normal. No congestion or rhinorrhea.      Mouth/Throat:      Mouth: Mucous membranes are moist.      Pharynx: Oropharynx is clear. No oropharyngeal exudate or posterior oropharyngeal erythema.   Eyes:      Extraocular Movements: Extraocular movements intact.      Conjunctiva/sclera: Conjunctivae normal.      Pupils: Pupils are equal, round, and reactive to light.   Neck:      Vascular: No carotid bruit.   Cardiovascular:      Rate and Rhythm: Bradycardia present. Rhythm irregular.      Pulses: Normal pulses.      Heart sounds: No murmur heard.     No friction rub. No gallop.   Pulmonary:      Effort: Respiratory distress present.      Breath sounds: Normal breath sounds. No wheezing, rhonchi or rales.      Comments: Bilateral lung base crackles  Increased work of breathing  Abdominal:      General: Bowel sounds are normal. There is no distension.      Palpations: Abdomen is soft.      Tenderness: There is no abdominal tenderness. There is no guarding or rebound.   Musculoskeletal:      Cervical back: Normal range of motion and neck supple. No tenderness.      Right lower le+ Edema present.      Left lower le+ Edema present.   Skin:     General: Skin is warm.      Capillary Refill: Capillary refill takes less than 2 seconds.      Comments: Chronic venous stasis bilaterally    Neurological:      General: No focal deficit present.      Mental Status: She is alert and oriented to person, place, and time.      Sensory: No sensory deficit.      Motor: Weakness (generalized)  present.      Coordination: Coordination normal.      Gait: Gait normal.   Psychiatric:         Mood and Affect: Mood normal.         Behavior: Behavior normal.         Thought Content: Thought content normal.         Judgment: Judgment normal.             Significant Labs: All pertinent labs within the past 24 hours have been reviewed.    Significant Imaging: I have reviewed all pertinent imaging results/findings within the past 24 hours.

## 2024-07-14 NOTE — ASSESSMENT & PLAN NOTE
Patient is identified as having  evidence of new onset  heart failure that is Acute. CHF is currently uncontrolled due to Continued edema of extremities, Dyspnea not returned to baseline after single doses of IV diuretic, >3 pillow orthopnea, Rales/crackles on pulmonary exam, and Pulmonary edema/pleural effusion on CXR. Latest ECHO performed and demonstrates 55% EF  Continue Furosemide and monitor clinical status closely. Monitor on telemetry. Patient is on CHF pathway.  Monitor strict Is&Os and daily weights.  Place on fluid restriction of 1.5 L. Cardiology has been consulted. Continue to stress to patient importance of self efficacy and  on diet for CHF. Last BNP reviewed- and noted below   Recent Labs   Lab 07/13/24  1811   BNP 2,367*

## 2024-07-14 NOTE — PROGRESS NOTES
[facility]  [facility] Medicine  Progress Note    Patient Name: Kaleigh Delgado  MRN: [id number]  Patient Class: IP- Inpatient   Admission Date: 7/13/2024  Length of Stay: 1 days  Attending Physician: Vita Lockwood MD  Primary Care Provider: Aldair Kaur MD        Subjective:     Principal Problem:CHF (congestive heart failure)        HPI:  Kaleigh Delgado is a 74 y.o. female with a PMH  has no past medical history on file. who presented to the ED for further evaluation of worsening dyspnea/shortness of breath and bilateral leg swelling x2 days duration.  Patient denies prior history of CHF and is not currently on home O2 or diuretics.  Given worsening symptoms, patient called EMS and was found to be hypoxic with O2 saturation 88% on room air and was initiated on supplemental oxygen at 6 L via nasal cannula but was titrated up to 15 L non-rebreather.  Patient reported no known alleviating factors, and aggravating factors including laying flat and with exertion.  She denied endorsing any lightheadedness, dizziness, headache, visual changes, fever, chills, sweats, nausea, vomiting, chest pain, abdominal pain, dysuria, hematuria, melena, hematochezia, diarrhea, or onset neurological deficits.  Prior to onset of symptoms, patient reported being in her usual state of health with no other concerns or complaints.  All other review of systems negative except as noted above.  Initial workup in the ED revealed patient to be tachypneic and hypoxic with elevated D-dimer measuring 1.30.  BNP 03/20/2067, troponin 0.090, flu/COVID negative, UA negative for UTI. CTA positive for mild to moderate pulmonary edema, mild left pleural effusion, small right pleural effusion, but negative for pulmonary embolus.  Patient admitted to Hospital Medicine inpatient for continued medical management and workup of new onset heart failure.    PCP: No, Primary Doctor      Overview/Hospital Course:  74 y.o. female with no past medical  history on file admitted for new onset of CHF. Patient denies history of CHF or home oxygen use. Patient is currently for Hctz 12.5 mg daily for blood pressure management. Ed work up revealed elevated D-dimer measuring 1.30. BNP 2367, troponin 0.090, flu/COVID negative, UA negative for UT  CTA positive for mild to moderate pulmonary edema, mild left pleural effusion, small right pleural effusion, but negative for pulmonary embolus. CXR showed cardiomegaly with perihilar edema. Correlate clinically to CHF. Trace pleural effusions. Correlate clinically to CHF. Echo resulted with a 55% EF. Cardiology following.    Patient bradycardic, STAT EKG. Elevated BP. PRN IV hydralazine ordered. Reported increased SOB after hydralazine administered. Gave lasix 20 mg IV once, then 40 mg IV x2 doses. Started losartan 50 mg for BP control. STAT CXR and supplemental oxygen PRN. Trend troponin.     Interval History: SOB improving. Continue supplemental oxygen PRN. Reporting left groin pain radiating to her knee. PRN pain medication and XRAY left hip.    Review of Systems   Constitutional:  Positive for activity change and fatigue. Negative for appetite change, chills and fever.   HENT:  Negative for congestion, facial swelling, rhinorrhea, sinus pressure, sinus pain, sneezing, sore throat and trouble swallowing.    Eyes:  Negative for photophobia and visual disturbance.   Respiratory:  Positive for cough, chest tightness and shortness of breath. Negative for wheezing.    Cardiovascular:  Positive for leg swelling. Negative for chest pain and palpitations.   Gastrointestinal:  Negative for abdominal distention, abdominal pain, constipation, diarrhea, nausea and vomiting.   Endocrine: Negative for cold intolerance and heat intolerance.   Genitourinary:  Negative for difficulty urinating, dysuria, flank pain, frequency, hematuria and urgency.   Musculoskeletal:  Positive for gait problem and myalgias.   Skin:  Positive for color change  (BLE discoloration). Negative for pallor, rash and wound.   Allergic/Immunologic: Negative for environmental allergies, food allergies and immunocompromised state.   Neurological:  Negative for dizziness, seizures, syncope, weakness, light-headedness, numbness and headaches.   Hematological:  Does not bruise/bleed easily.   Psychiatric/Behavioral:  Negative for agitation and confusion. The patient is not nervous/anxious.      Objective:     Vital Signs (Most Recent):  Temp: 98 °F (36.7 °C) (07/14/24 1342)  Pulse: (!) 44 (07/14/24 1342)  Resp: 18 (07/14/24 1342)  BP: (!) 170/110 (07/14/24 1342)  SpO2: (!) 94 % (07/14/24 1342) Vital Signs (24h Range):  Temp:  [98 °F (36.7 °C)-98.8 °F (37.1 °C)] 98 °F (36.7 °C)  Pulse:  [38-73] 44  Resp:  [17-33] 18  SpO2:  [92 %-99 %] 94 %  BP: (115-182)/() 170/110     Weight: 70.2 kg (154 lb 12.2 oz)  Body mass index is 22.21 kg/m².    Intake/Output Summary (Last 24 hours) at 7/14/2024 1551  Last data filed at 7/14/2024 1425  Gross per 24 hour   Intake 270 ml   Output 650 ml   Net -380 ml         Physical Exam  Constitutional:       General: She is in acute distress.      Appearance: She is not ill-appearing or toxic-appearing.   HENT:      Head: Normocephalic and atraumatic.      Right Ear: External ear normal.      Left Ear: External ear normal.      Nose: Nose normal. No congestion or rhinorrhea.      Mouth/Throat:      Mouth: Mucous membranes are moist.      Pharynx: Oropharynx is clear. No oropharyngeal exudate or posterior oropharyngeal erythema.   Eyes:      Extraocular Movements: Extraocular movements intact.      Conjunctiva/sclera: Conjunctivae normal.      Pupils: Pupils are equal, round, and reactive to light.   Neck:      Vascular: No carotid bruit.   Cardiovascular:      Rate and Rhythm: Bradycardia present. Rhythm irregular.      Pulses: Normal pulses.      Heart sounds: No murmur heard.     No friction rub. No gallop.   Pulmonary:      Effort: Respiratory  distress present.      Breath sounds: Normal breath sounds. No wheezing, rhonchi or rales.      Comments: Bilateral lung base crackles  Increased work of breathing  Abdominal:      General: Bowel sounds are normal. There is no distension.      Palpations: Abdomen is soft.      Tenderness: There is no abdominal tenderness. There is no guarding or rebound.   Musculoskeletal:      Cervical back: Normal range of motion and neck supple. No tenderness.      Right lower le+ Edema present.      Left lower le+ Edema present.   Skin:     General: Skin is warm.      Capillary Refill: Capillary refill takes less than 2 seconds.      Comments: Chronic venous stasis bilaterally    Neurological:      General: No focal deficit present.      Mental Status: She is alert and oriented to person, place, and time.      Sensory: No sensory deficit.      Motor: Weakness (generalized) present.      Coordination: Coordination normal.      Gait: Gait normal.   Psychiatric:         Mood and Affect: Mood normal.         Behavior: Behavior normal.         Thought Content: Thought content normal.         Judgment: Judgment normal.             Significant Labs: All pertinent labs within the past 24 hours have been reviewed.    Significant Imaging: I have reviewed all pertinent imaging results/findings within the past 24 hours.    Assessment/Plan:      * CHF (congestive heart failure)  Patient is identified as having  evidence of new onset  heart failure that is Acute. CHF is currently uncontrolled due to Continued edema of extremities, Dyspnea not returned to baseline after single doses of IV diuretic, >3 pillow orthopnea, Rales/crackles on pulmonary exam, and Pulmonary edema/pleural effusion on CXR. Latest ECHO performed and demonstrates 55% EF  Continue Furosemide and monitor clinical status closely. Monitor on telemetry. Patient is on CHF pathway.  Monitor strict Is&Os and daily weights.  Place on fluid restriction of 1.5 L. Cardiology  has been consulted. Continue to stress to patient importance of self efficacy and  on diet for CHF. Last BNP reviewed- and noted below   Recent Labs   Lab 07/13/24  1811   BNP 2,367*         Acute hypoxemic respiratory failure  Patient with Hypoxic Respiratory failure which is Acute.  she is not on home oxygen. Supplemental oxygen was provided and noted- Oxygen Concentration (%):  [36] 36    Signs/symptoms of respiratory failure include- tachypnea, increased work of breathing, and respiratory distress. Contributing diagnoses includes - CHF, Pleural effusion, Pneumonia, and Pulmonary Embolus Labs and images were reviewed. Patient Has not had a recent ABG. Will treat underlying causes and adjust management of respiratory failure as follows:  Plan:  -Continue treatment and workup of CHF  -Titrate oxygen requirements as needed  -Incentive spirometry   -Monitor pulse oximetry  -Nohemy prn      Shortness of breath  - IV lasix  - supplemental oxygen    Hypertension  Chronic, uncontrolled. Latest blood pressure and vitals reviewed-     Temp:  [98 °F (36.7 °C)-98.8 °F (37.1 °C)]   Pulse:  [38-73]   Resp:  [17-33]   BP: (115-182)/()   SpO2:  [92 %-99 %] .   Home meds for hypertension were reviewed and noted below.   Hypertension Medications               hydroCHLOROthiazide (MICROZIDE) 12.5 mg capsule Take 1 capsule by mouth every morning.     While in the hospital, will manage blood pressure as follows; Continue home antihypertensive regimen    Will utilize p.r.n. blood pressure medication only if patient's blood pressure greater than 160/100 and she develops symptoms such as worsening chest pain or shortness of breath.      Bradycardia    - STAT EKG results pending      VTE Risk Mitigation (From admission, onward)           Ordered     enoxaparin injection 40 mg  Daily         07/13/24 2334     IP VTE HIGH RISK PATIENT  Once         07/13/24 2334     Place sequential compression device  Until discontinued          07/13/24 2334                    Discharge Planning   DUTCH:      Code Status: Full Code   Is the patient medically ready for discharge?:     Reason for patient still in hospital (select all that apply): Patient trending condition  Discharge Plan A: Home                  FLYNN Bajwa  Department of Hospital Medicine   [facility]

## 2024-07-14 NOTE — ED NOTES
Receiving nurse unable to take report per unit clerk;requesting additional time. Will re-attempt report

## 2024-07-14 NOTE — HPI
Kaleigh Delgado is a 74 y.o. female with a PMH  has no past medical history on file. who presented to the ED for further evaluation of worsening dyspnea/shortness of breath and bilateral leg swelling x2 days duration.  Patient denies prior history of CHF and is not currently on home O2 or diuretics.  Given worsening symptoms, patient called EMS and was found to be hypoxic with O2 saturation 88% on room air and was initiated on supplemental oxygen at 6 L via nasal cannula but was titrated up to 15 L non-rebreather.  Patient reported no known alleviating factors, and aggravating factors including laying flat and with exertion.  She denied endorsing any lightheadedness, dizziness, headache, visual changes, fever, chills, sweats, nausea, vomiting, chest pain, abdominal pain, dysuria, hematuria, melena, hematochezia, diarrhea, or onset neurological deficits.  Prior to onset of symptoms, patient reported being in her usual state of health with no other concerns or complaints.  All other review of systems negative except as noted above.  Initial workup in the ED revealed patient to be tachypneic and hypoxic with elevated D-dimer measuring 1.30.  BNP 03/20/2067, troponin 0.090, flu/COVID negative, UA negative for UTI. CTA positive for mild to moderate pulmonary edema, mild left pleural effusion, small right pleural effusion, but negative for pulmonary embolus.  Patient admitted to Hospital Medicine inpatient for continued medical management and workup of new onset heart failure.    PCP: No, Primary Doctor

## 2024-07-14 NOTE — HOSPITAL COURSE
74 y.o. female with no past medical history on file admitted for new onset of CHF. Patient denies history of CHF or home oxygen use. Patient is currently for Hctz 12.5 mg daily for blood pressure management. Ed work up revealed elevated D-dimer measuring 1.30. BNP 2367, troponin 0.090, flu/COVID negative, UA negative for UTI. CTA positive for mild to moderate pulmonary edema, mild left pleural effusion, small right pleural effusion, but negative for pulmonary embolus. CXR showed cardiomegaly with perihilar edema. Correlate clinically to CHF. Trace pleural effusions. Correlate clinically to CHF. Echo resulted with a 55% EF. Cardiology following.    Patient s/p CRT pacemaker placed on 07/24/24. Incision dressing, CDI. No redness or swelling noted. Post-op EKG showed atrial-sensed ventricular-paced rhythm with occasional premature ventricular complexes.  Reported left chest wall discomfort from procedure. Controlled with PRN pain medication. Left arm is to be kept immobilized with sling for 5 days. H/H is stable. HR 70s-80s. BNP on 07/23 was 463, improving. Denies nausea, vomiting, or abdominal pain this morning. Tolerating diet. Significant improvement of SOB with diuresis. Left groin pain improved significantly and controlled with PRN pain medication. Patient accepted to [facility] for SNF, cleared by cardiology to discharge. Patient seen and examined, deemed stable for discharge.

## 2024-07-14 NOTE — ASSESSMENT & PLAN NOTE
Patient is identified as having  evidence of new onset  heart failure that is Acute. CHF is currently uncontrolled due to Continued edema of extremities, Dyspnea not returned to baseline after single doses of IV diuretic, >3 pillow orthopnea, Rales/crackles on pulmonary exam, and Pulmonary edema/pleural effusion on CXR. Latest ECHO performed and demonstrates- No results found for this or any previous visit.  Continue Furosemide and monitor clinical status closely. Monitor on telemetry. Patient is on CHF pathway.  Monitor strict Is&Os and daily weights.  Place on fluid restriction of 1.5 L. Cardiology has been consulted. Continue to stress to patient importance of self efficacy and  on diet for CHF. Last BNP reviewed- and noted below   Recent Labs   Lab 07/13/24  1811   BNP 2,367*

## 2024-07-14 NOTE — ASSESSMENT & PLAN NOTE
Chronic, uncontrolled. Latest blood pressure and vitals reviewed-     Temp:  [98.1 °F (36.7 °C)-98.8 °F (37.1 °C)]   Pulse:  [42-64]   Resp:  [17-33]   BP: (115-182)/(65-78)   SpO2:  [93 %-99 %] .   Home meds for hypertension were reviewed and noted below.   Hypertension Medications               hydroCHLOROthiazide (MICROZIDE) 12.5 mg capsule Take 1 capsule by mouth every morning.     While in the hospital, will manage blood pressure as follows; Continue home antihypertensive regimen    Will utilize p.r.n. blood pressure medication only if patient's blood pressure greater than 160/100 and she develops symptoms such as worsening chest pain or shortness of breath.

## 2024-07-14 NOTE — H&P
[facility] Surg 3  [facility] Medicine  History & Physical    Patient Name: Kaleigh Delgado  MRN: [id number]  Patient Class: IP- Inpatient  Admission Date: 7/13/2024  Attending Physician: Donte Yang   Primary Care Provider: Sharron Primary Doctor         Patient information was obtained from patient, past medical records, and ER records.     Subjective:     Principal Problem:Shortness of breath    Chief Complaint:   Chief Complaint   Patient presents with    Shortness of Breath     Pt with a hx of pulmonary edema and unspecified cardiac hx arrived in the ED via [facility] on 15L O2 via [facility] with c/o SOB and BLE edema x last couple days. Pt reports swelling has worsened today. +4 pitting edema noted to BLE. Pt denies home O2, CHF, blood thinners, or diuretics. [facility] medic reports FD was the first on scene and pt's O2 saturation was in the 80s on RA.         HPI: Kaleigh Delgado is a 74 y.o. female with a PMH  has no past medical history on file. who presented to the ED for further evaluation of worsening dyspnea/shortness of breath and bilateral leg swelling x2 days duration.  Patient denies prior history of CHF and is not currently on home O2 or diuretics.  Given worsening symptoms, patient called EMS and was found to be hypoxic with O2 saturation 88% on room air and was initiated on supplemental oxygen at 6 L via nasal cannula but was titrated up to 15 L non-rebreather.  Patient reported no known alleviating factors, and aggravating factors including laying flat and with exertion.  She denied endorsing any lightheadedness, dizziness, headache, visual changes, fever, chills, sweats, nausea, vomiting, chest pain, abdominal pain, dysuria, hematuria, melena, hematochezia, diarrhea, or onset neurological deficits.  Prior to onset of symptoms, patient reported being in her usual state of health with no other concerns or complaints.  All other review of systems negative except as noted above.  Initial workup in the ED revealed  patient to be tachypneic and hypoxic with elevated D-dimer measuring 1.30.  BNP 03/20/2067, troponin 0.090, flu/COVID negative, UA negative for UTI. CTA positive for mild to moderate pulmonary edema, mild left pleural effusion, small right pleural effusion, but negative for pulmonary embolus.  Patient admitted to Hospital Medicine inpatient for continued medical management and workup of new onset heart failure.    PCP: No, Primary Doctor      History reviewed. No pertinent past medical history.    Past Surgical History:   Procedure Laterality Date    exploratory lap      FIRST RIB REMOVAL      KNEE SURGERY      TONSILLECTOMY      VARICOSE VEIN SURGERY         Review of patient's allergies indicates:  No Known Allergies    No current facility-administered medications on file prior to encounter.     Current Outpatient Medications on File Prior to Encounter   Medication Sig    hydroCHLOROthiazide (MICROZIDE) 12.5 mg capsule Take 1 capsule by mouth every morning.     Family History    Family history is unknown by patient.       Tobacco Use    Smoking status: Never    Smokeless tobacco: Not on file   Substance and Sexual Activity    Alcohol use: No    Drug use: No    Sexual activity: Not on file     Review of Systems   All other systems reviewed and are negative.    Objective:     Vital Signs (Most Recent):  Temp: 98.2 °F (36.8 °C) (07/13/24 2232)  Pulse: (!) 45 (07/13/24 2232)  Resp: 18 (07/13/24 2232)  BP: (!) 182/72 (07/13/24 2232)  SpO2: 98 % (07/13/24 2232) Vital Signs (24h Range):  Temp:  [98.2 °F (36.8 °C)-98.8 °F (37.1 °C)] 98.2 °F (36.8 °C)  Pulse:  [42-64] 45  Resp:  [18-33] 18  SpO2:  [93 %-99 %] 98 %  BP: (115-182)/(71-78) 182/72     Weight: 70.2 kg (154 lb 12.2 oz)  Body mass index is 22.21 kg/m².     Physical Exam  Vitals reviewed.   Constitutional:       General: She is in acute distress.      Appearance: She is normal weight. She is not ill-appearing, toxic-appearing or diaphoretic.   HENT:      Head:  Normocephalic and atraumatic.      Right Ear: External ear normal.      Left Ear: External ear normal.      Nose: Nose normal. No congestion or rhinorrhea.      Mouth/Throat:      Mouth: Mucous membranes are moist.      Pharynx: Oropharynx is clear. No oropharyngeal exudate or posterior oropharyngeal erythema.   Eyes:      General: No scleral icterus.     Extraocular Movements: Extraocular movements intact.      Conjunctiva/sclera: Conjunctivae normal.      Pupils: Pupils are equal, round, and reactive to light.   Neck:      Vascular: No carotid bruit.   Cardiovascular:      Rate and Rhythm: Regular rhythm. Bradycardia present.      Pulses: Normal pulses.      Heart sounds: Normal heart sounds. No murmur heard.     No friction rub. No gallop.   Pulmonary:      Effort: Respiratory distress present.      Breath sounds: No stridor. No wheezing, rhonchi or rales.      Comments: Patient with increased work of breathing however negative use of accessory muscles noted.  Bilateral crackles present.  Chest:      Chest wall: No tenderness.   Abdominal:      General: Abdomen is flat. Bowel sounds are normal. There is no distension.      Palpations: Abdomen is soft.      Tenderness: There is no abdominal tenderness. There is no right CVA tenderness, left CVA tenderness, guarding or rebound.      Hernia: No hernia is present.   Musculoskeletal:         General: Swelling present. No tenderness, deformity or signs of injury. Normal range of motion.      Cervical back: Normal range of motion and neck supple. No rigidity or tenderness.      Right lower leg: Edema present.      Left lower leg: Edema present.   Lymphadenopathy:      Cervical: No cervical adenopathy.   Skin:     General: Skin is warm and dry.      Capillary Refill: Capillary refill takes less than 2 seconds.      Coloration: Skin is not jaundiced or pale.      Findings: No bruising, erythema, lesion or rash.      Comments: Bilateral lower extremity chronic venous  stasis present   Neurological:      General: No focal deficit present.      Mental Status: She is alert and oriented to person, place, and time. Mental status is at baseline.      Cranial Nerves: No cranial nerve deficit.      Sensory: No sensory deficit.      Motor: No weakness.      Coordination: Coordination normal.   Psychiatric:         Mood and Affect: Mood normal.         Behavior: Behavior normal.         Thought Content: Thought content normal.         Judgment: Judgment normal.              CRANIAL NERVES     CN III, IV, VI   Pupils are equal, round, and reactive to light.       Significant Labs: All pertinent labs within the past 24 hours have been reviewed.    Significant Imaging: I have reviewed all pertinent imaging results/findings within the past 24 hours.    LABS:  Recent Results (from the past 24 hour(s))   CBC auto differential    Collection Time: 07/13/24  6:11 PM   Result Value Ref Range    WBC 6.10 3.90 - 12.70 K/uL    RBC 4.13 4.00 - 5.40 M/uL    Hemoglobin 13.5 12.0 - 16.0 g/dL    Hematocrit 40.2 37.0 - 48.5 %    MCV 97 82 - 98 fL    MCH 32.7 (H) 27.0 - 31.0 pg    MCHC 33.6 32.0 - 36.0 g/dL    RDW 13.0 11.5 - 14.5 %    Platelets 187 150 - 450 K/uL    MPV 10.8 9.2 - 12.9 fL    Immature Granulocytes 0.5 0.0 - 0.5 %    Gran # (ANC) 3.9 1.8 - 7.7 K/uL    Immature Grans (Abs) 0.03 0.00 - 0.04 K/uL    Lymph # 1.7 1.0 - 4.8 K/uL    Mono # 0.4 0.3 - 1.0 K/uL    Eos # 0.1 0.0 - 0.5 K/uL    Baso # 0.05 0.00 - 0.20 K/uL    nRBC 0 0 /100 WBC    Gran % 63.3 38.0 - 73.0 %    Lymph % 27.4 18.0 - 48.0 %    Mono % 6.7 4.0 - 15.0 %    Eosinophil % 1.3 0.0 - 8.0 %    Basophil % 0.8 0.0 - 1.9 %    Differential Method Automated    Comprehensive metabolic panel    Collection Time: 07/13/24  6:11 PM   Result Value Ref Range    Sodium 142 136 - 145 mmol/L    Potassium 4.1 3.5 - 5.1 mmol/L    Chloride 111 (H) 95 - 110 mmol/L    CO2 19 (L) 23 - 29 mmol/L    Glucose 191 (H) 70 - 110 mg/dL    BUN 32 (H) 8 - 23 mg/dL     Creatinine 1.0 0.5 - 1.4 mg/dL    Calcium 9.1 8.7 - 10.5 mg/dL    Total Protein 6.5 6.0 - 8.4 g/dL    Albumin 3.5 3.5 - 5.2 g/dL    Total Bilirubin 1.3 (H) 0.1 - 1.0 mg/dL    Alkaline Phosphatase 132 55 - 135 U/L    AST 80 (H) 10 - 40 U/L    ALT 47 (H) 10 - 44 U/L    eGFR 59 (A) >60 mL/min/1.73 m^2    Anion Gap 12 8 - 16 mmol/L   Brain natriuretic peptide    Collection Time: 07/13/24  6:11 PM   Result Value Ref Range    BNP 2,367 (H) 0 - 99 pg/mL   D dimer, quantitative    Collection Time: 07/13/24  6:11 PM   Result Value Ref Range    D-Dimer 1.30 (H) <0.50 mg/L FEU   Troponin I    Collection Time: 07/13/24  6:11 PM   Result Value Ref Range    Troponin I 0.090 (H) 0.000 - 0.026 ng/mL   Magnesium    Collection Time: 07/13/24  6:11 PM   Result Value Ref Range    Magnesium 2.3 1.6 - 2.6 mg/dL   Phosphorus    Collection Time: 07/13/24  6:11 PM   Result Value Ref Range    Phosphorus 5.7 (H) 2.7 - 4.5 mg/dL   Lactic acid, plasma    Collection Time: 07/13/24  6:11 PM   Result Value Ref Range    Lactate (Lactic Acid) 2.2 0.5 - 2.2 mmol/L   COVID-19 Rapid Screening    Collection Time: 07/13/24  6:14 PM   Result Value Ref Range    SARS-CoV-2 RNA, Amplification, Qual Negative Negative   Influenza A & B by Molecular    Collection Time: 07/13/24  6:14 PM    Specimen: Nasopharyngeal Swab   Result Value Ref Range    Influenza A, Molecular Negative Negative    Influenza B, Molecular Negative Negative    Flu A & B Source NP    Urinalysis, Reflex to Urine Culture Urine, Clean Catch    Collection Time: 07/13/24  7:54 PM    Specimen: Urine, Clean Catch   Result Value Ref Range    Specimen UA Urine, Clean Catch     Color, UA Colorless (A) Yellow, Straw, Brittny    Appearance, UA Clear Clear    pH, UA 5.0 5.0 - 8.0    Specific Gravity, UA 1.010 1.005 - 1.030    Protein, UA 1+ (A) Negative    Glucose, UA Negative Negative    Ketones, UA Negative Negative    Bilirubin (UA) Negative Negative    Occult Blood UA 1+ (A) Negative    Nitrite, UA  Negative Negative    Urobilinogen, UA Negative <2.0 EU/dL    Leukocytes, UA Negative Negative   Urinalysis Microscopic    Collection Time: 07/13/24  7:54 PM   Result Value Ref Range    RBC, UA 0 0 - 4 /hpf    WBC, UA 0 0 - 5 /hpf    Bacteria Rare None-Occ /hpf    Hyaline Casts, UA 0 0-1/lpf /lpf    Microscopic Comment SEE COMMENT        RADIOLOGY  CTA Chest Non-Coronary (PE Studies)    Result Date: 7/13/2024  EXAMINATION: CTA CHEST NON CORONARY (PE STUDIES) CLINICAL HISTORY: Pulmonary embolism (PE) suspected, positive D-dimer; TECHNIQUE: Low dose axial images, sagittal and coronal reformations were obtained from the thoracic inlet to the lung bases following the IV administration of 100 mL of Omnipaque 350.  Contrast timing was optimized to evaluate the pulmonary arteries.  MIP images were performed. COMPARISON: None FINDINGS: Mild left-sided pleural effusion.  Cardiomegaly.  No pulmonary emboli.  Subpleural ground-glass opacities.  Bronchial vascular thickening along the hilar regions.  Findings suggestive of pulmonary edema and CHF.  No evidence for dissection aneurysm.  Mild loculated right anterior pleural effusion.  Degenerative joint disease.  Main pulmonary artery is mildly enlarged.     Cardiomegaly.  Mild moderate pulmonary edema.  Mild left-sided pleural effusion.  Small right-sided pleural effusion.  Correlate clinically to CHF.  No evidence for pulmonary emboli.  Atypical infectious process not excluded. All CT scans   are performed using dose optimization techniques including the following: automated exposure control; adjustment of the mA and/or kV; use of iterative reconstruction technique.  Dose modulation was employed for ALARA by means of: Automated exposure control; adjustment of the mA and/or kV according to patient size (this includes techniques or standardized protocols for targeted exams where dose is matched to indication/reason for exam; i.e. extremities or head); and/or use of iterative  reconstructive technique. Electronically signed by: Jose Pritchard Date:    07/13/2024 Time:    20:07    X-Ray Chest AP Portable    Result Date: 7/13/2024  EXAMINATION: XR CHEST AP PORTABLE CLINICAL HISTORY: SOB; TECHNIQUE: Single frontal view of the chest was performed. COMPARISON: None FINDINGS: Cardiomegaly with perihilar edema.  Correlate clinically to CHF.  Trace pleural effusions.  Correlate clinically to CHF. Bones are intact.     As above Electronically signed by: Jose Pritchard Date:    07/13/2024 Time:    18:35      EKG    MICROBIOLOGY    MDM    Assessment/Plan:     * Shortness of breath    Acute hypoxemic respiratory failure  Patient with Hypoxic Respiratory failure which is Acute.  she is not on home oxygen. Supplemental oxygen was provided and noted- Oxygen Concentration (%):  [36] 36    Signs/symptoms of respiratory failure include- tachypnea, increased work of breathing, and respiratory distress. Contributing diagnoses includes - CHF, Pleural effusion, Pneumonia, and Pulmonary Embolus Labs and images were reviewed. Patient Has not had a recent ABG. Will treat underlying causes and adjust management of respiratory failure as follows:  Plan:  -Continue treatment and workup of CHF  -Titrate oxygen requirements as needed  -Incentive spirometry   -Monitor pulse oximetry  -Duonebs prn      CHF (congestive heart failure)  Patient is identified as having  evidence of new onset  heart failure that is Acute. CHF is currently uncontrolled due to Continued edema of extremities, Dyspnea not returned to baseline after single doses of IV diuretic, >3 pillow orthopnea, Rales/crackles on pulmonary exam, and Pulmonary edema/pleural effusion on CXR. Latest ECHO performed and demonstrates- No results found for this or any previous visit.  Continue Furosemide and monitor clinical status closely. Monitor on telemetry. Patient is on CHF pathway.  Monitor strict Is&Os and daily weights.  Place on fluid restriction of 1.5 L.  Cardiology has been consulted. Continue to stress to patient importance of self efficacy and  on diet for CHF. Last BNP reviewed- and noted below   Recent Labs   Lab 07/13/24 1811   BNP 2,367*       Hypertension  Chronic, uncontrolled. Latest blood pressure and vitals reviewed-     Temp:  [98.1 °F (36.7 °C)-98.8 °F (37.1 °C)]   Pulse:  [42-64]   Resp:  [17-33]   BP: (115-182)/(65-78)   SpO2:  [93 %-99 %] .   Home meds for hypertension were reviewed and noted below.   Hypertension Medications               hydroCHLOROthiazide (MICROZIDE) 12.5 mg capsule Take 1 capsule by mouth every morning.     While in the hospital, will manage blood pressure as follows; Continue home antihypertensive regimen    Will utilize p.r.n. blood pressure medication only if patient's blood pressure greater than 160/100 and she develops symptoms such as worsening chest pain or shortness of breath.        VTE Risk Mitigation (From admission, onward)           Ordered     enoxaparin injection 40 mg  Daily         07/13/24 2334     IP VTE HIGH RISK PATIENT  Once         07/13/24 2334     Place sequential compression device  Until discontinued         07/13/24 2334                  //Core Measures   -DVT proph: SCDs, Lovenox   -Code status: Full    -Surrogate: none provided       Components of this note were documented using a voice recognition system and are subject to errors not corrected at the time the document was proof read. Please contact the author for any clarifications.        Donte Yang MD  Department of Hospital Medicine  [facility]

## 2024-07-14 NOTE — ASSESSMENT & PLAN NOTE
Patient with Hypoxic Respiratory failure which is Acute.  she is not on home oxygen. Supplemental oxygen was provided and noted- Oxygen Concentration (%):  [36] 36    Signs/symptoms of respiratory failure include- tachypnea, increased work of breathing, and respiratory distress. Contributing diagnoses includes - CHF, Pleural effusion, Pneumonia, and Pulmonary Embolus Labs and images were reviewed. Patient Has not had a recent ABG. Will treat underlying causes and adjust management of respiratory failure as follows:  Plan:  -Continue treatment and workup of CHF  -Titrate oxygen requirements as needed  -Incentive spirometry   -Monitor pulse oximetry  -Nohemy blairn

## 2024-07-14 NOTE — SUBJECTIVE & OBJECTIVE
History reviewed. No pertinent past medical history.    Past Surgical History:   Procedure Laterality Date    exploratory lap      FIRST RIB REMOVAL      KNEE SURGERY      TONSILLECTOMY      VARICOSE VEIN SURGERY         Review of patient's allergies indicates:  No Known Allergies    No current facility-administered medications on file prior to encounter.     Current Outpatient Medications on File Prior to Encounter   Medication Sig    hydroCHLOROthiazide (MICROZIDE) 12.5 mg capsule Take 1 capsule by mouth every morning.     Family History    Family history is unknown by patient.       Tobacco Use    Smoking status: Never    Smokeless tobacco: Not on file   Substance and Sexual Activity    Alcohol use: No    Drug use: No    Sexual activity: Not on file     Review of Systems   All other systems reviewed and are negative.    Objective:     Vital Signs (Most Recent):  Temp: 98.2 °F (36.8 °C) (07/13/24 2232)  Pulse: (!) 45 (07/13/24 2232)  Resp: 18 (07/13/24 2232)  BP: (!) 182/72 (07/13/24 2232)  SpO2: 98 % (07/13/24 2232) Vital Signs (24h Range):  Temp:  [98.2 °F (36.8 °C)-98.8 °F (37.1 °C)] 98.2 °F (36.8 °C)  Pulse:  [42-64] 45  Resp:  [18-33] 18  SpO2:  [93 %-99 %] 98 %  BP: (115-182)/(71-78) 182/72     Weight: 70.2 kg (154 lb 12.2 oz)  Body mass index is 22.21 kg/m².     Physical Exam  Vitals reviewed.   Constitutional:       General: She is in acute distress.      Appearance: She is normal weight. She is not ill-appearing, toxic-appearing or diaphoretic.   HENT:      Head: Normocephalic and atraumatic.      Right Ear: External ear normal.      Left Ear: External ear normal.      Nose: Nose normal. No congestion or rhinorrhea.      Mouth/Throat:      Mouth: Mucous membranes are moist.      Pharynx: Oropharynx is clear. No oropharyngeal exudate or posterior oropharyngeal erythema.   Eyes:      General: No scleral icterus.     Extraocular Movements: Extraocular movements intact.      Conjunctiva/sclera: Conjunctivae  normal.      Pupils: Pupils are equal, round, and reactive to light.   Neck:      Vascular: No carotid bruit.   Cardiovascular:      Rate and Rhythm: Regular rhythm. Bradycardia present.      Pulses: Normal pulses.      Heart sounds: Normal heart sounds. No murmur heard.     No friction rub. No gallop.   Pulmonary:      Effort: Respiratory distress present.      Breath sounds: No stridor. No wheezing, rhonchi or rales.      Comments: Patient with increased work of breathing however negative use of accessory muscles noted.  Bilateral crackles present.  Chest:      Chest wall: No tenderness.   Abdominal:      General: Abdomen is flat. Bowel sounds are normal. There is no distension.      Palpations: Abdomen is soft.      Tenderness: There is no abdominal tenderness. There is no right CVA tenderness, left CVA tenderness, guarding or rebound.      Hernia: No hernia is present.   Musculoskeletal:         General: Swelling present. No tenderness, deformity or signs of injury. Normal range of motion.      Cervical back: Normal range of motion and neck supple. No rigidity or tenderness.      Right lower leg: Edema present.      Left lower leg: Edema present.   Lymphadenopathy:      Cervical: No cervical adenopathy.   Skin:     General: Skin is warm and dry.      Capillary Refill: Capillary refill takes less than 2 seconds.      Coloration: Skin is not jaundiced or pale.      Findings: No bruising, erythema, lesion or rash.      Comments: Bilateral lower extremity chronic venous stasis present   Neurological:      General: No focal deficit present.      Mental Status: She is alert and oriented to person, place, and time. Mental status is at baseline.      Cranial Nerves: No cranial nerve deficit.      Sensory: No sensory deficit.      Motor: No weakness.      Coordination: Coordination normal.   Psychiatric:         Mood and Affect: Mood normal.         Behavior: Behavior normal.         Thought Content: Thought content  normal.         Judgment: Judgment normal.              CRANIAL NERVES     CN III, IV, VI   Pupils are equal, round, and reactive to light.       Significant Labs: All pertinent labs within the past 24 hours have been reviewed.    Significant Imaging: I have reviewed all pertinent imaging results/findings within the past 24 hours.    LABS:  Recent Results (from the past 24 hour(s))   CBC auto differential    Collection Time: 07/13/24  6:11 PM   Result Value Ref Range    WBC 6.10 3.90 - 12.70 K/uL    RBC 4.13 4.00 - 5.40 M/uL    Hemoglobin 13.5 12.0 - 16.0 g/dL    Hematocrit 40.2 37.0 - 48.5 %    MCV 97 82 - 98 fL    MCH 32.7 (H) 27.0 - 31.0 pg    MCHC 33.6 32.0 - 36.0 g/dL    RDW 13.0 11.5 - 14.5 %    Platelets 187 150 - 450 K/uL    MPV 10.8 9.2 - 12.9 fL    Immature Granulocytes 0.5 0.0 - 0.5 %    Gran # (ANC) 3.9 1.8 - 7.7 K/uL    Immature Grans (Abs) 0.03 0.00 - 0.04 K/uL    Lymph # 1.7 1.0 - 4.8 K/uL    Mono # 0.4 0.3 - 1.0 K/uL    Eos # 0.1 0.0 - 0.5 K/uL    Baso # 0.05 0.00 - 0.20 K/uL    nRBC 0 0 /100 WBC    Gran % 63.3 38.0 - 73.0 %    Lymph % 27.4 18.0 - 48.0 %    Mono % 6.7 4.0 - 15.0 %    Eosinophil % 1.3 0.0 - 8.0 %    Basophil % 0.8 0.0 - 1.9 %    Differential Method Automated    Comprehensive metabolic panel    Collection Time: 07/13/24  6:11 PM   Result Value Ref Range    Sodium 142 136 - 145 mmol/L    Potassium 4.1 3.5 - 5.1 mmol/L    Chloride 111 (H) 95 - 110 mmol/L    CO2 19 (L) 23 - 29 mmol/L    Glucose 191 (H) 70 - 110 mg/dL    BUN 32 (H) 8 - 23 mg/dL    Creatinine 1.0 0.5 - 1.4 mg/dL    Calcium 9.1 8.7 - 10.5 mg/dL    Total Protein 6.5 6.0 - 8.4 g/dL    Albumin 3.5 3.5 - 5.2 g/dL    Total Bilirubin 1.3 (H) 0.1 - 1.0 mg/dL    Alkaline Phosphatase 132 55 - 135 U/L    AST 80 (H) 10 - 40 U/L    ALT 47 (H) 10 - 44 U/L    eGFR 59 (A) >60 mL/min/1.73 m^2    Anion Gap 12 8 - 16 mmol/L   Brain natriuretic peptide    Collection Time: 07/13/24  6:11 PM   Result Value Ref Range    BNP 2,367 (H) 0 - 99 pg/mL    D dimer, quantitative    Collection Time: 07/13/24  6:11 PM   Result Value Ref Range    D-Dimer 1.30 (H) <0.50 mg/L FEU   Troponin I    Collection Time: 07/13/24  6:11 PM   Result Value Ref Range    Troponin I 0.090 (H) 0.000 - 0.026 ng/mL   Magnesium    Collection Time: 07/13/24  6:11 PM   Result Value Ref Range    Magnesium 2.3 1.6 - 2.6 mg/dL   Phosphorus    Collection Time: 07/13/24  6:11 PM   Result Value Ref Range    Phosphorus 5.7 (H) 2.7 - 4.5 mg/dL   Lactic acid, plasma    Collection Time: 07/13/24  6:11 PM   Result Value Ref Range    Lactate (Lactic Acid) 2.2 0.5 - 2.2 mmol/L   COVID-19 Rapid Screening    Collection Time: 07/13/24  6:14 PM   Result Value Ref Range    SARS-CoV-2 RNA, Amplification, Qual Negative Negative   Influenza A & B by Molecular    Collection Time: 07/13/24  6:14 PM    Specimen: Nasopharyngeal Swab   Result Value Ref Range    Influenza A, Molecular Negative Negative    Influenza B, Molecular Negative Negative    Flu A & B Source NP    Urinalysis, Reflex to Urine Culture Urine, Clean Catch    Collection Time: 07/13/24  7:54 PM    Specimen: Urine, Clean Catch   Result Value Ref Range    Specimen UA Urine, Clean Catch     Color, UA Colorless (A) Yellow, Straw, Brittny    Appearance, UA Clear Clear    pH, UA 5.0 5.0 - 8.0    Specific Gravity, UA 1.010 1.005 - 1.030    Protein, UA 1+ (A) Negative    Glucose, UA Negative Negative    Ketones, UA Negative Negative    Bilirubin (UA) Negative Negative    Occult Blood UA 1+ (A) Negative    Nitrite, UA Negative Negative    Urobilinogen, UA Negative <2.0 EU/dL    Leukocytes, UA Negative Negative   Urinalysis Microscopic    Collection Time: 07/13/24  7:54 PM   Result Value Ref Range    RBC, UA 0 0 - 4 /hpf    WBC, UA 0 0 - 5 /hpf    Bacteria Rare None-Occ /hpf    Hyaline Casts, UA 0 0-1/lpf /lpf    Microscopic Comment SEE COMMENT        RADIOLOGY  CTA Chest Non-Coronary (PE Studies)    Result Date: 7/13/2024  EXAMINATION: CTA CHEST NON CORONARY (PE  STUDIES) CLINICAL HISTORY: Pulmonary embolism (PE) suspected, positive D-dimer; TECHNIQUE: Low dose axial images, sagittal and coronal reformations were obtained from the thoracic inlet to the lung bases following the IV administration of 100 mL of Omnipaque 350.  Contrast timing was optimized to evaluate the pulmonary arteries.  MIP images were performed. COMPARISON: None FINDINGS: Mild left-sided pleural effusion.  Cardiomegaly.  No pulmonary emboli.  Subpleural ground-glass opacities.  Bronchial vascular thickening along the hilar regions.  Findings suggestive of pulmonary edema and CHF.  No evidence for dissection aneurysm.  Mild loculated right anterior pleural effusion.  Degenerative joint disease.  Main pulmonary artery is mildly enlarged.     Cardiomegaly.  Mild moderate pulmonary edema.  Mild left-sided pleural effusion.  Small right-sided pleural effusion.  Correlate clinically to CHF.  No evidence for pulmonary emboli.  Atypical infectious process not excluded. All CT scans   are performed using dose optimization techniques including the following: automated exposure control; adjustment of the mA and/or kV; use of iterative reconstruction technique.  Dose modulation was employed for ALARA by means of: Automated exposure control; adjustment of the mA and/or kV according to patient size (this includes techniques or standardized protocols for targeted exams where dose is matched to indication/reason for exam; i.e. extremities or head); and/or use of iterative reconstructive technique. Electronically signed by: Jose Pritchard Date:    07/13/2024 Time:    20:07    X-Ray Chest AP Portable    Result Date: 7/13/2024  EXAMINATION: XR CHEST AP PORTABLE CLINICAL HISTORY: SOB; TECHNIQUE: Single frontal view of the chest was performed. COMPARISON: None FINDINGS: Cardiomegaly with perihilar edema.  Correlate clinically to CHF.  Trace pleural effusions.  Correlate clinically to CHF. Bones are intact.     As above  Electronically signed by: Jose Pritchard Date:    07/13/2024 Time:    18:35      EKG    MICROBIOLOGY    MDM

## 2024-07-14 NOTE — CONSULTS
[facility]  Cardiology  Consult Note    Patient Name: Kaleigh Delgado  MRN: [id number]  Admission Date: 7/13/2024  Hospital Length of Stay: 1 days  Code Status: Full Code   Attending Provider: Vita Lockwood MD   Consulting Provider: Malcolm Kruger MD  Primary Care Physician: Sharron, Primary Doctor  Principal Problem:Shortness of breath    Patient information was obtained from patient and ER records.     Inpatient consult to Cardiology  Consult performed by: Lito Kruger MD  Consult ordered by: Donte Yang MD        Subjective:     Chief Complaint: SOB    HPI:  Patient is a 74 year old female with a past medical history of hypertension, history of CHF, followed in the past by [facility], apparent history of afib, but now in sinus rhythm. BNP greater than 2000. Chest x-ray shows CHF. EKG shows sinus bradycardia with PAC's. There is a mild increase in troponin 0.09. Patient states history of heart cath by [facility] in 2021 that was treated medically.    Recommend continue Iv diuresis, check echocardiogram, and blood pressure control. Patient will eventually need nuclear stress test. We will need to obtain cath report from [facility].     History reviewed. No pertinent past medical history.    Past Surgical History:   Procedure Laterality Date    exploratory lap      FIRST RIB REMOVAL      KNEE SURGERY      TONSILLECTOMY      VARICOSE VEIN SURGERY         Review of patient's allergies indicates:  No Known Allergies    No current facility-administered medications on file prior to encounter.     Current Outpatient Medications on File Prior to Encounter   Medication Sig    hydroCHLOROthiazide (MICROZIDE) 12.5 mg capsule Take 1 capsule by mouth every morning.     Family History    Family history is unknown by patient.       Tobacco Use    Smoking status: Never    Smokeless tobacco: Not on file   Substance and Sexual Activity    Alcohol use: No    Drug use: No    Sexual activity: Not on file       Review of Systems   All  other systems reviewed and are negative.     Objective:      Vital Signs (Most Recent):  Temp: 98.2 °F (36.8 °C) (07/13/24 2232)  Pulse: (!) 45 (07/13/24 2232)  Resp: 18 (07/13/24 2232)  BP: (!) 182/72 (07/13/24 2232)  SpO2: 98 % (07/13/24 2232) Vital Signs (24h Range):  Temp:  [98.2 °F (36.8 °C)-98.8 °F (37.1 °C)] 98.2 °F (36.8 °C)  Pulse:  [42-64] 45  Resp:  [18-33] 18  SpO2:  [93 %-99 %] 98 %  BP: (115-182)/(71-78) 182/72      Weight: 70.2 kg (154 lb 12.2 oz)  Body mass index is 22.21 kg/m².     Physical Exam  Vitals reviewed.   Constitutional:       General: She is in acute distress.      Appearance: She is normal weight. She is not ill-appearing, toxic-appearing or diaphoretic.   HENT:      Head: Normocephalic and atraumatic.      Right Ear: External ear normal.      Left Ear: External ear normal.      Nose: Nose normal. No congestion or rhinorrhea.      Mouth/Throat:      Mouth: Mucous membranes are moist.      Pharynx: Oropharynx is clear. No oropharyngeal exudate or posterior oropharyngeal erythema.   Eyes:      General: No scleral icterus.     Extraocular Movements: Extraocular movements intact.      Conjunctiva/sclera: Conjunctivae normal.      Pupils: Pupils are equal, round, and reactive to light.   Neck:      Vascular: No carotid bruit.   Cardiovascular:      Rate and Rhythm: Regular rhythm. Bradycardia present.      Pulses: Normal pulses.      Heart sounds: Normal heart sounds. No murmur heard.     No friction rub. No gallop.   Pulmonary:      Effort: Respiratory distress present.      Breath sounds: No stridor. No wheezing, rhonchi or rales.      Comments: Patient with increased work of breathing however negative use of accessory muscles noted.  Bilateral crackles present.  Chest:      Chest wall: No tenderness.   Abdominal:      General: Abdomen is flat. Bowel sounds are normal. There is no distension.      Palpations: Abdomen is soft.      Tenderness: There is no abdominal tenderness. There is no  right CVA tenderness, left CVA tenderness, guarding or rebound.      Hernia: No hernia is present.   Musculoskeletal:         General: Swelling present. No tenderness, deformity or signs of injury. Normal range of motion.      Cervical back: Normal range of motion and neck supple. No rigidity or tenderness.      Right lower leg: Edema present.      Left lower leg: Edema present.   Lymphadenopathy:      Cervical: No cervical adenopathy.   Skin:     General: Skin is warm and dry.      Capillary Refill: Capillary refill takes less than 2 seconds.      Coloration: Skin is not jaundiced or pale.      Findings: No bruising, erythema, lesion or rash.      Comments: Bilateral lower extremity chronic venous stasis present   Neurological:      General: No focal deficit present.      Mental Status: She is alert and oriented to person, place, and time. Mental status is at baseline.      Cranial Nerves: No cranial nerve deficit.      Sensory: No sensory deficit.      Motor: No weakness.      Coordination: Coordination normal.   Psychiatric:         Mood and Affect: Mood normal.         Behavior: Behavior normal.         Thought Content: Thought content normal.         Judgment: Judgment normal.                  CRANIAL NERVES      CN III, IV, VI   Pupils are equal, round, and reactive to light.        Significant Labs: All pertinent labs within the past 24 hours have been reviewed.     Significant Imaging: I have reviewed all pertinent imaging results/findings within the past 24 hours.     LABS:        Recent Results (from the past 24 hour(s))   CBC auto differential     Collection Time: 07/13/24  6:11 PM   Result Value Ref Range     WBC 6.10 3.90 - 12.70 K/uL     RBC 4.13 4.00 - 5.40 M/uL     Hemoglobin 13.5 12.0 - 16.0 g/dL     Hematocrit 40.2 37.0 - 48.5 %     MCV 97 82 - 98 fL     MCH 32.7 (H) 27.0 - 31.0 pg     MCHC 33.6 32.0 - 36.0 g/dL     RDW 13.0 11.5 - 14.5 %     Platelets 187 150 - 450 K/uL     MPV 10.8 9.2 - 12.9 fL      Immature Granulocytes 0.5 0.0 - 0.5 %     Gran # (ANC) 3.9 1.8 - 7.7 K/uL     Immature Grans (Abs) 0.03 0.00 - 0.04 K/uL     Lymph # 1.7 1.0 - 4.8 K/uL     Mono # 0.4 0.3 - 1.0 K/uL     Eos # 0.1 0.0 - 0.5 K/uL     Baso # 0.05 0.00 - 0.20 K/uL     nRBC 0 0 /100 WBC     Gran % 63.3 38.0 - 73.0 %     Lymph % 27.4 18.0 - 48.0 %     Mono % 6.7 4.0 - 15.0 %     Eosinophil % 1.3 0.0 - 8.0 %     Basophil % 0.8 0.0 - 1.9 %     Differential Method Automated     Comprehensive metabolic panel     Collection Time: 07/13/24  6:11 PM   Result Value Ref Range     Sodium 142 136 - 145 mmol/L     Potassium 4.1 3.5 - 5.1 mmol/L     Chloride 111 (H) 95 - 110 mmol/L     CO2 19 (L) 23 - 29 mmol/L     Glucose 191 (H) 70 - 110 mg/dL     BUN 32 (H) 8 - 23 mg/dL     Creatinine 1.0 0.5 - 1.4 mg/dL     Calcium 9.1 8.7 - 10.5 mg/dL     Total Protein 6.5 6.0 - 8.4 g/dL     Albumin 3.5 3.5 - 5.2 g/dL     Total Bilirubin 1.3 (H) 0.1 - 1.0 mg/dL     Alkaline Phosphatase 132 55 - 135 U/L     AST 80 (H) 10 - 40 U/L     ALT 47 (H) 10 - 44 U/L     eGFR 59 (A) >60 mL/min/1.73 m^2     Anion Gap 12 8 - 16 mmol/L   Brain natriuretic peptide     Collection Time: 07/13/24  6:11 PM   Result Value Ref Range     BNP 2,367 (H) 0 - 99 pg/mL   D dimer, quantitative     Collection Time: 07/13/24  6:11 PM   Result Value Ref Range     D-Dimer 1.30 (H) <0.50 mg/L FEU   Troponin I     Collection Time: 07/13/24  6:11 PM   Result Value Ref Range     Troponin I 0.090 (H) 0.000 - 0.026 ng/mL   Magnesium     Collection Time: 07/13/24  6:11 PM   Result Value Ref Range     Magnesium 2.3 1.6 - 2.6 mg/dL   Phosphorus     Collection Time: 07/13/24  6:11 PM   Result Value Ref Range     Phosphorus 5.7 (H) 2.7 - 4.5 mg/dL   Lactic acid, plasma     Collection Time: 07/13/24  6:11 PM   Result Value Ref Range     Lactate (Lactic Acid) 2.2 0.5 - 2.2 mmol/L   COVID-19 Rapid Screening     Collection Time: 07/13/24  6:14 PM   Result Value Ref Range     SARS-CoV-2 RNA, Amplification,  Qual Negative Negative   Influenza A & B by Molecular     Collection Time: 07/13/24  6:14 PM     Specimen: Nasopharyngeal Swab   Result Value Ref Range     Influenza A, Molecular Negative Negative     Influenza B, Molecular Negative Negative     Flu A & B Source NP     Urinalysis, Reflex to Urine Culture Urine, Clean Catch     Collection Time: 07/13/24  7:54 PM     Specimen: Urine, Clean Catch   Result Value Ref Range     Specimen UA Urine, Clean Catch       Color, UA Colorless (A) Yellow, Straw, Brittny     Appearance, UA Clear Clear     pH, UA 5.0 5.0 - 8.0     Specific Gravity, UA 1.010 1.005 - 1.030     Protein, UA 1+ (A) Negative     Glucose, UA Negative Negative     Ketones, UA Negative Negative     Bilirubin (UA) Negative Negative     Occult Blood UA 1+ (A) Negative     Nitrite, UA Negative Negative     Urobilinogen, UA Negative <2.0 EU/dL     Leukocytes, UA Negative Negative   Urinalysis Microscopic     Collection Time: 07/13/24  7:54 PM   Result Value Ref Range     RBC, UA 0 0 - 4 /hpf     WBC, UA 0 0 - 5 /hpf     Bacteria Rare None-Occ /hpf     Hyaline Casts, UA 0 0-1/lpf /lpf     Microscopic Comment SEE COMMENT           RADIOLOGY  CTA Chest Non-Coronary (PE Studies)     Result Date: 7/13/2024  EXAMINATION: CTA CHEST NON CORONARY (PE STUDIES) CLINICAL HISTORY: Pulmonary embolism (PE) suspected, positive D-dimer; TECHNIQUE: Low dose axial images, sagittal and coronal reformations were obtained from the thoracic inlet to the lung bases following the IV administration of 100 mL of Omnipaque 350.  Contrast timing was optimized to evaluate the pulmonary arteries.  MIP images were performed. COMPARISON: None FINDINGS: Mild left-sided pleural effusion.  Cardiomegaly.  No pulmonary emboli.  Subpleural ground-glass opacities.  Bronchial vascular thickening along the hilar regions.  Findings suggestive of pulmonary edema and CHF.  No evidence for dissection aneurysm.  Mild loculated right anterior pleural effusion.   Degenerative joint disease.  Main pulmonary artery is mildly enlarged.      Cardiomegaly.  Mild moderate pulmonary edema.  Mild left-sided pleural effusion.  Small right-sided pleural effusion.  Correlate clinically to CHF.  No evidence for pulmonary emboli.  Atypical infectious process not excluded. All CT scans   are performed using dose optimization techniques including the following: automated exposure control; adjustment of the mA and/or kV; use of iterative reconstruction technique.  Dose modulation was employed for ALARA by means of: Automated exposure control; adjustment of the mA and/or kV according to patient size (this includes techniques or standardized protocols for targeted exams where dose is matched to indication/reason for exam; i.e. extremities or head); and/or use of iterative reconstructive technique. Electronically signed by:   Jose Pritchard Date:                                       07/13/2024 Time:                                            20:07     X-Ray Chest AP Portable     Result Date: 7/13/2024  EXAMINATION: XR CHEST AP PORTABLE CLINICAL HISTORY: SOB; TECHNIQUE: Single frontal view of the chest was performed. COMPARISON: None FINDINGS: Cardiomegaly with perihilar edema.  Correlate clinically to CHF.  Trace pleural effusions.  Correlate clinically to CHF. Bones are intact.      As above Electronically signed by:     Jose Pritchard Date:                                         07/13/2024 Time:                                      18:35        EKG     MICROBIOLOGY     [facility] [place]     Assessment/Plan:      * Shortness of breath  . Patient is a 74 year old female with a past medical history of hypertension, history of CHF, followed in the past by [facility], apparent history of afib, but now in sinus rhythm. BNP greater than 2000. Chest x-ray shows CHF. EKG shows sinus bradycardia with PAC's. There is a mild increase in troponin 0.09. Patient states history of heart cath by [facility] in 2021 that was treated  medically.    Recommend continue Iv diuresis, check echocardiogram, and blood pressure control. Patient will eventually need nuclear stress test. We will need to obtain cath report from [facility].     Acute hypoxemic respiratory failure  Patient with Hypoxic Respiratory failure which is Acute.  she is not on home oxygen. Supplemental oxygen was provided and noted- Oxygen Concentration (%):  [36] 36     Signs/symptoms of respiratory failure include- tachypnea, increased work of breathing, and respiratory distress. Contributing diagnoses includes - CHF, Pleural effusion, Pneumonia, and Pulmonary Embolus Labs and images were reviewed. Patient Has not had a recent ABG. Will treat underlying causes and adjust management of respiratory failure as follows:  Plan:  -Continue treatment and workup of CHF  -Titrate oxygen requirements as needed  -Incentive spirometry   -Monitor pulse oximetry  -Duonebs prn        CHF (congestive heart failure)  Patient is identified as having  evidence of new onset  heart failure that is Acute. CHF is currently uncontrolled due to Continued edema of extremities, Dyspnea not returned to baseline after single doses of IV diuretic, >3 pillow orthopnea, Rales/crackles on pulmonary exam, and Pulmonary edema/pleural effusion on CXR. Latest ECHO performed and demonstrates- No results found for this or any previous visit.  Continue Furosemide and monitor clinical status closely. Monitor on telemetry. Patient is on CHF pathway.  Monitor strict Is&Os and daily weights.  Place on fluid restriction of 1.5 L. Cardiology has been consulted. Continue to stress to patient importance of self efficacy and  on diet for CHF. Last BNP reviewed- and noted below       Recent Labs   Lab 07/13/24  1811   BNP 2,367*         Hypertension  Chronic, uncontrolled. Latest blood pressure and vitals reviewed-      Temp:  [98.1 °F (36.7 °C)-98.8 °F (37.1 °C)]   Pulse:  [42-64]   Resp:  [17-33]   BP: (115-182)/(65-78)   SpO2:   [93 %-99 %] .   Home meds for hypertension were reviewed and noted below.   Hypertension Medications                    hydroCHLOROthiazide (MICROZIDE) 12.5 mg capsule Take 1 capsule by mouth every morning.      While in the hospital, will manage blood pressure as follows; Continue home antihypertensive regimen     Will utilize p.r.n. blood pressure medication only if patient's blood pressure greater than 160/100 and she develops symptoms such as worsening chest pain or shortness of breath.    VTE Risk Mitigation (From admission, onward)           Ordered     enoxaparin injection 40 mg  Daily         07/13/24 2334     IP VTE HIGH RISK PATIENT  Once         07/13/24 2334     Place sequential compression device  Until discontinued         07/13/24 2334                    Thank you for your consult. I will follow-up with patient. Please contact us if you have any additional questions.    Cary Crook  Cardiology   [facility]

## 2024-07-14 NOTE — PLAN OF CARE
[facility]  Initial Discharge Assessment       Primary Care Provider: Aldair Kaur MD    Admission Diagnosis: SOB (shortness of breath) [R06.02]    Admission Date: 7/13/2024  Expected Discharge Date:     Transition of Care Barriers: None    Payor: HU[employer] MANAGED MEDICARE / Plan: HU[employer] MEDICARE HMO / Product Type: Capitation /     Extended Emergency Contact Information  Primary Emergency Contact: Andres Delgado  Address: [address] [place] [address] [id number] [place] [address]  Home Phone: [phone]  Relation: Spouse  Secondary Emergency Contact: GoldenReva  Mobile Phone: [phone]  Relation: Friend  Preferred language: English   needed? No    Discharge Plan A: Home         [facility] [place], [place] - [address] [facility]  [address] [facility] [address]  Phone: [phone] Fax: [phone]      Initial Assessment (most recent)       Adult Discharge Assessment - 07/14/24 1217          Discharge Assessment    Assessment Type Discharge Planning Assessment     Confirmed/corrected address, phone number and insurance Yes     Confirmed Demographics Correct on Facesheet     Source of Information patient     When was your last doctors appointment? 06/18/24     Communicated DUTCH with patient/caregiver Date not available/Unable to determine     Reason For Admission SOB     People in Home alone;spouse   Pateint spouse has been in the hospital since December    Do you expect to return to your current living situation? Yes     Do you have help at home or someone to help you manage your care at home? No     Prior to hospitilization cognitive status: Alert/Oriented     Current cognitive status: Alert/Oriented     Walking or Climbing Stairs Difficulty no     Dressing/Bathing Difficulty no     Home Accessibility not wheelchair accessible     Home Layout Able to live on 1st floor     Equipment Currently Used at Home walker,  "rolling;rollator;cane, straight;shower chair     Readmission within 30 days? No     Patient currently being followed by outpatient case management? No     Do you currently have service(s) that help you manage your care at home? No     Do you take prescription medications? Yes     Do you have prescription coverage? Yes     Coverage HU[employer] MANAGED MEDICARE - HU[employer]NA MEDICARE O     Do you have any problems affording any of your prescribed medications? No     Is the patient taking medications as prescribed? yes     Who is going to help you get home at discharge? Reva Ryder (Friend) or Obey Delgado (Brother in Law)     How do you get to doctors appointments? car, drives self     Are you on dialysis? No     Do you take coumadin? No     Discharge Plan A Home     DME Needed Upon Discharge  none     Discharge Plan discussed with: Patient     Transition of Care Barriers None        Social Isolation    How often do you feel lonely or isolated from those around you?  Rarely                      Patient shared that she has been in her home alone since December after her  has undergone numerous surgeries and rehab placements. She stated that after speaking with her  he recommended for the patient to speak with a psychologist. She admitted that she is lonely at night have wake up in the middle of the night with thoughts but she did not want to specify the thought she has. She stated that she is "just feeling down".  MARIAA spoke with attending MD to request a psych consult.     Lenka Johnson LMSW 7/14/2024 12:28 PM        Lenka Johnson LMSW 7/14/2024 12:25 PM      "

## 2024-07-14 NOTE — ASSESSMENT & PLAN NOTE
Chronic, uncontrolled. Latest blood pressure and vitals reviewed-     Temp:  [98 °F (36.7 °C)-98.8 °F (37.1 °C)]   Pulse:  [38-73]   Resp:  [17-33]   BP: (115-182)/()   SpO2:  [92 %-99 %] .   Home meds for hypertension were reviewed and noted below.   Hypertension Medications               hydroCHLOROthiazide (MICROZIDE) 12.5 mg capsule Take 1 capsule by mouth every morning.     While in the hospital, will manage blood pressure as follows; Continue home antihypertensive regimen    Will utilize p.r.n. blood pressure medication only if patient's blood pressure greater than 160/100 and she develops symptoms such as worsening chest pain or shortness of breath.

## 2024-07-15 ENCOUNTER — DOCUMENTATION ONLY (OUTPATIENT)
Dept: CARDIOLOGY | Facility: CLINIC | Age: 74
End: 2024-07-15
Payer: MEDICARE

## 2024-07-15 LAB
ANION GAP SERPL CALC-SCNC: 11 MMOL/L (ref 8–16)
BASOPHILS # BLD AUTO: 0.02 K/UL (ref 0–0.2)
BASOPHILS NFR BLD: 0.3 % (ref 0–1.9)
BNP SERPL-MCNC: 2727 PG/ML (ref 0–99)
BUN SERPL-MCNC: 31 MG/DL (ref 8–23)
CALCIUM SERPL-MCNC: 8.4 MG/DL (ref 8.7–10.5)
CHLORIDE SERPL-SCNC: 107 MMOL/L (ref 95–110)
CO2 SERPL-SCNC: 23 MMOL/L (ref 23–29)
CREAT SERPL-MCNC: 1 MG/DL (ref 0.5–1.4)
DIFFERENTIAL METHOD BLD: ABNORMAL
EOSINOPHIL # BLD AUTO: 0 K/UL (ref 0–0.5)
EOSINOPHIL NFR BLD: 0.3 % (ref 0–8)
ERYTHROCYTE [DISTWIDTH] IN BLOOD BY AUTOMATED COUNT: 13.2 % (ref 11.5–14.5)
EST. GFR  (NO RACE VARIABLE): 59 ML/MIN/1.73 M^2
GLUCOSE SERPL-MCNC: 108 MG/DL (ref 70–110)
HCT VFR BLD AUTO: 34.8 % (ref 37–48.5)
HGB BLD-MCNC: 11.6 G/DL (ref 12–16)
IMM GRANULOCYTES # BLD AUTO: 0.02 K/UL (ref 0–0.04)
IMM GRANULOCYTES NFR BLD AUTO: 0.3 % (ref 0–0.5)
LYMPHOCYTES # BLD AUTO: 0.6 K/UL (ref 1–4.8)
LYMPHOCYTES NFR BLD: 7.9 % (ref 18–48)
MAGNESIUM SERPL-MCNC: 1.9 MG/DL (ref 1.6–2.6)
MCH RBC QN AUTO: 32.4 PG (ref 27–31)
MCHC RBC AUTO-ENTMCNC: 33.3 G/DL (ref 32–36)
MCV RBC AUTO: 97 FL (ref 82–98)
MONOCYTES # BLD AUTO: 0.5 K/UL (ref 0.3–1)
MONOCYTES NFR BLD: 6.4 % (ref 4–15)
NEUTROPHILS # BLD AUTO: 6 K/UL (ref 1.8–7.7)
NEUTROPHILS NFR BLD: 84.8 % (ref 38–73)
NRBC BLD-RTO: 0 /100 WBC
OHS QRS DURATION: 134 MS
OHS QRS DURATION: 152 MS
OHS QTC CALCULATION: 424 MS
OHS QTC CALCULATION: 455 MS
PHOSPHATE SERPL-MCNC: 4 MG/DL (ref 2.7–4.5)
PLATELET # BLD AUTO: 144 K/UL (ref 150–450)
PMV BLD AUTO: 11.4 FL (ref 9.2–12.9)
POTASSIUM SERPL-SCNC: 3.2 MMOL/L (ref 3.5–5.1)
RBC # BLD AUTO: 3.58 M/UL (ref 4–5.4)
SODIUM SERPL-SCNC: 141 MMOL/L (ref 136–145)
TROPONIN I SERPL DL<=0.01 NG/ML-MCNC: 0.2 NG/ML (ref 0–0.03)
WBC # BLD AUTO: 7.08 K/UL (ref 3.9–12.7)

## 2024-07-15 PROCEDURE — G0425 INPT/ED TELECONSULT30: HCPCS | Mod: 95,,, | Performed by: PSYCHIATRY & NEUROLOGY

## 2024-07-15 PROCEDURE — 36415 COLL VENOUS BLD VENIPUNCTURE: CPT | Performed by: HOSPITALIST

## 2024-07-15 PROCEDURE — 94761 N-INVAS EAR/PLS OXIMETRY MLT: CPT

## 2024-07-15 PROCEDURE — 84100 ASSAY OF PHOSPHORUS: CPT

## 2024-07-15 PROCEDURE — 36415 COLL VENOUS BLD VENIPUNCTURE: CPT

## 2024-07-15 PROCEDURE — 99232 SBSQ HOSP IP/OBS MODERATE 35: CPT | Mod: ,,, | Performed by: INTERNAL MEDICINE

## 2024-07-15 PROCEDURE — 63600175 PHARM REV CODE 636 W HCPCS: Performed by: HOSPITALIST

## 2024-07-15 PROCEDURE — 27000221 HC OXYGEN, UP TO 24 HOURS

## 2024-07-15 PROCEDURE — 25000003 PHARM REV CODE 250

## 2024-07-15 PROCEDURE — 84484 ASSAY OF TROPONIN QUANT: CPT | Performed by: EMERGENCY MEDICINE

## 2024-07-15 PROCEDURE — 83880 ASSAY OF NATRIURETIC PEPTIDE: CPT

## 2024-07-15 PROCEDURE — 80048 BASIC METABOLIC PNL TOTAL CA: CPT | Performed by: HOSPITALIST

## 2024-07-15 PROCEDURE — 85025 COMPLETE CBC W/AUTO DIFF WBC: CPT

## 2024-07-15 PROCEDURE — 63600175 PHARM REV CODE 636 W HCPCS

## 2024-07-15 PROCEDURE — 83735 ASSAY OF MAGNESIUM: CPT

## 2024-07-15 PROCEDURE — 11000001 HC ACUTE MED/SURG PRIVATE ROOM

## 2024-07-15 PROCEDURE — 25000003 PHARM REV CODE 250: Performed by: HOSPITALIST

## 2024-07-15 RX ORDER — POTASSIUM CHLORIDE 20 MEQ/1
40 TABLET, EXTENDED RELEASE ORAL ONCE
Status: COMPLETED | OUTPATIENT
Start: 2024-07-15 | End: 2024-07-15

## 2024-07-15 RX ADMIN — POLYETHYLENE GLYCOL 3350 17 G: 17 POWDER, FOR SOLUTION ORAL at 09:07

## 2024-07-15 RX ADMIN — HYDRALAZINE HYDROCHLORIDE 10 MG: 20 INJECTION, SOLUTION INTRAMUSCULAR; INTRAVENOUS at 04:07

## 2024-07-15 RX ADMIN — FUROSEMIDE 40 MG: 10 INJECTION, SOLUTION INTRAMUSCULAR; INTRAVENOUS at 09:07

## 2024-07-15 RX ADMIN — POTASSIUM CHLORIDE 40 MEQ: 1500 TABLET, EXTENDED RELEASE ORAL at 03:07

## 2024-07-15 RX ADMIN — SENNOSIDES AND DOCUSATE SODIUM 1 TABLET: 50; 8.6 TABLET ORAL at 09:07

## 2024-07-15 RX ADMIN — LOSARTAN POTASSIUM 50 MG: 50 TABLET, FILM COATED ORAL at 09:07

## 2024-07-15 RX ADMIN — ENOXAPARIN SODIUM 40 MG: 40 INJECTION SUBCUTANEOUS at 05:07

## 2024-07-15 NOTE — SUBJECTIVE & OBJECTIVE
Interval History: f/u SOB. Patient awake and alert. NAD. Reports SOB is improving, remains of supplemental oxygen. Will wean as tolerated. Denies any CP, abdominal pain or nausea/vomiting. Cardiology planned stress test in a.m. Patient NPO at midnight.     Review of Systems  Objective:     Vital Signs (Most Recent):  Temp: 98.4 °F (36.9 °C) (07/15/24 1129)  Pulse: (!) 43 (07/15/24 1129)  Resp: 18 (07/15/24 1129)  BP: (!) 137/57 (07/15/24 1129)  SpO2: 96 % (07/15/24 1129) Vital Signs (24h Range):  Temp:  [97.5 °F (36.4 °C)-98.4 °F (36.9 °C)] 98.4 °F (36.9 °C)  Pulse:  [39-49] 43  Resp:  [17-18] 18  SpO2:  [95 %-98 %] 96 %  BP: (130-192)/(57-74) 137/57     Weight: 70.2 kg (154 lb 12.2 oz)  Body mass index is 22.21 kg/m².    Intake/Output Summary (Last 24 hours) at 7/15/2024 1554  Last data filed at 7/15/2024 1053  Gross per 24 hour   Intake 120 ml   Output 1950 ml   Net -1830 ml         Physical Exam  Vitals and nursing note reviewed.   Constitutional:       General: She is not in acute distress.     Appearance: She is ill-appearing (chronically).   HENT:      Mouth/Throat:      Mouth: Mucous membranes are moist.      Pharynx: Oropharynx is clear.   Eyes:      Pupils: Pupils are equal, round, and reactive to light.   Cardiovascular:      Rate and Rhythm: Regular rhythm. Bradycardia present.      Heart sounds: No murmur heard.  Pulmonary:      Effort: Pulmonary effort is normal.      Breath sounds: No wheezing.      Comments:  Bilateral lung base crackles  Abdominal:      General: Bowel sounds are normal. There is no distension.      Palpations: Abdomen is soft.      Tenderness: There is no abdominal tenderness.   Musculoskeletal:         General: Normal range of motion.      Right lower leg: Edema (+1) present.      Left lower leg: Edema (+1) present.   Skin:     General: Skin is warm and dry.      Comments: Chronic venous stasis bilaterally     Neurological:      General: No focal deficit present.      Mental Status:  She is alert and oriented to person, place, and time.      Motor: Weakness (generalized) present.             Significant Labs: All pertinent labs within the past 24 hours have been reviewed.  Recent Lab Results         07/15/24  1437   07/15/24  0818   07/14/24  1716        Allens Test     Pass       Anion Gap   11         Baso #   0.02         Basophil %   0.3         BNP 2,727  Comment: Values of less than 100 pg/ml are consistent with non-CHF populations.           Site     RR       BUN   31         Calcium   8.4         Chloride   107         CO2   23         Creatinine   1.0         DelSys     Nasal Can       Differential Method   Automated         eGFR   59         Eos #   0.0         Eos %   0.3         Flow     2       Glucose   108         Gran # (ANC)   6.0         Gran %   84.8         Hematocrit   34.8         Hemoglobin   11.6         Immature Grans (Abs)   0.02  Comment: Mild elevation in immature granulocytes is non specific and   can be seen in a variety of conditions including stress response,   acute inflammation, trauma and pregnancy. Correlation with other   laboratory and clinical findings is essential.           Immature Granulocytes   0.3         Lymph #   0.6         Lymph %   7.9         Magnesium    1.9         MCH   32.4         MCHC   33.3         MCV   97         Mode     SPONT       Mono #   0.5         Mono %   6.4         MPV   11.4         nRBC   0         Phosphorus Level   4.0         Platelet Count   144         POC BE     -1       POC HCO3     22.5       POC PCO2     32.4       POC PH     7.450       POC PO2     72       POC SATURATED O2     95       Potassium   3.2         RBC   3.58         RDW   13.2         Sample     ARTERIAL       Sodium   141         Troponin I   0.203  Comment: The reference interval for Troponin I represents the 99th percentile   cutoff   for our facility and is consistent with 3rd generation assay   performance.           WBC   7.08                  Significant Imaging: I have reviewed all pertinent imaging results/findings within the past 24 hours.

## 2024-07-15 NOTE — ASSESSMENT & PLAN NOTE
BNP 2367, received IV lasix x4. On nasal cannula  GDMT ARB, not on BB given bradycardia  Kidney function stable  Repeat BNP pending  Echo yesterday EF 55%  Nuc stress tomorrow  F/u in [facility]

## 2024-07-15 NOTE — ASSESSMENT & PLAN NOTE
Patient with Hypoxic Respiratory failure which is Acute.  she is not on home oxygen. Supplemental oxygen was provided and noted- Oxygen Concentration (%):  [32] 32    Signs/symptoms of respiratory failure include- tachypnea, increased work of breathing, and respiratory distress. Contributing diagnoses includes - CHF, Pleural effusion, Pneumonia, and Pulmonary Embolus Labs and images were reviewed. Patient Has not had a recent ABG. Will treat underlying causes and adjust management of respiratory failure as follows:  Plan:  -Continue treatment and workup of CHF  -Titrate oxygen requirements as needed  -Incentive spirometry   -Monitor pulse oximetry  -Nohemy blairn     90.7

## 2024-07-15 NOTE — ASSESSMENT & PLAN NOTE
Chronic, uncontrolled. Latest blood pressure and vitals reviewed-     Temp:  [97.5 °F (36.4 °C)-98.4 °F (36.9 °C)]   Pulse:  [39-49]   Resp:  [17-18]   BP: (130-192)/(57-74)   SpO2:  [95 %-98 %] .   Home meds for hypertension were reviewed and noted below.   Hypertension Medications               hydroCHLOROthiazide (MICROZIDE) 12.5 mg capsule Take 1 capsule by mouth every morning.     While in the hospital, will manage blood pressure as follows; Continue home antihypertensive regimen    Will utilize p.r.n. blood pressure medication only if patient's blood pressure greater than 160/100 and she develops symptoms such as worsening chest pain or shortness of breath.

## 2024-07-15 NOTE — ASSESSMENT & PLAN NOTE
Patient is identified as having  evidence of new onset  heart failure that is Acute. CHF is currently uncontrolled due to Continued edema of extremities, Dyspnea not returned to baseline after single doses of IV diuretic, >3 pillow orthopnea, Rales/crackles on pulmonary exam, and Pulmonary edema/pleural effusion on CXR. Latest ECHO performed and demonstrates 55% EF  Continue Furosemide and monitor clinical status closely. Monitor on telemetry. Patient is on CHF pathway.  Monitor strict Is&Os and daily weights.  Place on fluid restriction of 1.5 L. Cardiology has been consulted. Continue to stress to patient importance of self efficacy and  on diet for CHF. Last BNP reviewed- and noted below   Recent Labs   Lab 07/15/24  1437   BNP 2,727*     -Cardiology consulted   -Planned Nuc stress test in a.m.   -pt npo at midnight

## 2024-07-15 NOTE — SUBJECTIVE & OBJECTIVE
Review of Systems   Constitutional: Positive for malaise/fatigue.   HENT: Negative.     Eyes: Negative.    Cardiovascular: Negative.    Respiratory:  Positive for shortness of breath.    Skin: Negative.    Musculoskeletal: Negative.    Gastrointestinal: Negative.    Genitourinary: Negative.    Neurological:  Positive for weakness.   Psychiatric/Behavioral: Negative.       Objective:     Vital Signs (Most Recent):  Temp: 98.4 °F (36.9 °C) (07/15/24 1129)  Pulse: (!) 43 (07/15/24 1129)  Resp: 18 (07/15/24 1129)  BP: (!) 137/57 (07/15/24 1129)  SpO2: 96 % (07/15/24 1129) Vital Signs (24h Range):  Temp:  [97.5 °F (36.4 °C)-98.4 °F (36.9 °C)] 98.4 °F (36.9 °C)  Pulse:  [39-49] 43  Resp:  [17-18] 18  SpO2:  [95 %-98 %] 96 %  BP: (130-192)/(57-74) 137/57     Weight: 70.2 kg (154 lb 12.2 oz)  Body mass index is 22.21 kg/m².     SpO2: 96 %         Intake/Output Summary (Last 24 hours) at 7/15/2024 1345  Last data filed at 7/15/2024 1053  Gross per 24 hour   Intake 270 ml   Output 1950 ml   Net -1680 ml       Lines/Drains/Airways       Peripheral Intravenous Line  Duration                  Peripheral IV - Single Lumen 07/13/24 1800 18 G 1 1/4 in Anterior;Proximal;Right Forearm 1 day         Peripheral IV - Single Lumen 07/13/24 1805 20 G 1 1/4 in Left Antecubital 1 day                       Physical Exam  Vitals and nursing note reviewed.   Constitutional:       Appearance: Normal appearance.   HENT:      Head: Normocephalic.   Eyes:      Pupils: Pupils are equal, round, and reactive to light.   Cardiovascular:      Rate and Rhythm: Normal rate and regular rhythm.      Heart sounds: Normal heart sounds, S1 normal and S2 normal. No murmur heard.     No S3 or S4 sounds.   Pulmonary:      Effort: Pulmonary effort is normal.      Breath sounds: Rales present.   Abdominal:      General: Bowel sounds are normal.      Palpations: Abdomen is soft.   Musculoskeletal:         General: Tenderness present. Normal range of motion.      " Cervical back: Normal range of motion.   Skin:     Capillary Refill: Capillary refill takes less than 2 seconds.   Neurological:      General: No focal deficit present.      Mental Status: She is alert and oriented to person, place, and time.   Psychiatric:         Mood and Affect: Mood is anxious.         Behavior: Behavior normal.         Thought Content: Thought content normal.            Significant Labs: BMP:   Recent Labs   Lab 07/13/24 1811 07/14/24  0350 07/15/24  0818   * 100 108    142 141   K 4.1 4.0 3.2*   * 108 107   CO2 19* 21* 23   BUN 32* 30* 31*   CREATININE 1.0 0.8 1.0   CALCIUM 9.1 9.2 8.4*   MG 2.3  --  1.9   , CMP   Recent Labs   Lab 07/13/24 1811 07/14/24  0350 07/15/24  0818    142 141   K 4.1 4.0 3.2*   * 108 107   CO2 19* 21* 23   * 100 108   BUN 32* 30* 31*   CREATININE 1.0 0.8 1.0   CALCIUM 9.1 9.2 8.4*   PROT 6.5  --   --    ALBUMIN 3.5  --   --    BILITOT 1.3*  --   --    ALKPHOS 132  --   --    AST 80*  --   --    ALT 47*  --   --    ANIONGAP 12 13 11   , CBC   Recent Labs   Lab 07/13/24  1811 07/15/24  0818   WBC 6.10 7.08   HGB 13.5 11.6*   HCT 40.2 34.8*    144*   , INR No results for input(s): "INR", "PROTIME" in the last 48 hours., Lipid Panel No results for input(s): "CHOL", "HDL", "LDLCALC", "TRIG", "CHOLHDL" in the last 48 hours., Troponin   Recent Labs   Lab 07/13/24 1811 07/14/24  1542 07/15/24  0818   TROPONINI 0.090* 0.309* 0.203*   , and All pertinent lab results from the last 24 hours have been reviewed.    Significant Imaging: Cardiac Cath: reviewed, Echocardiogram: Transthoracic echo (TTE) complete (Cupid Only):   Results for orders placed or performed during the hospital encounter of 07/13/24   Echo   Result Value Ref Range    BSA 1.86 m2    LVIDd 4.20 3.5 - 6.0 cm    LV Systolic Volume 47.52 mL    LV Systolic Volume Index 25.4 mL/m2    LVIDs 3.40 2.1 - 4.0 cm    LV Diastolic Volume 78.48 mL    LV Diastolic Volume Index " 41.97 mL/m2    Left Ventricular End Systolic Volume by Teichholz Method 47.52 mL    Left Ventricular End Diastolic Volume by Teichholz Method 78.48 mL    IVS 1.53 (A) 0.6 - 1.1 cm    LVOT diameter 2.07 cm    LVOT area 3.4 cm2    FS 19 (A) 28 - 44 %    Left Ventricle Relative Wall Thickness 0.70 cm    Posterior Wall 1.47 (A) 0.6 - 1.1 cm    LV mass 249.50 g    LV Mass Index 133 g/m2    MV Peak E Bobby 1.00 m/s    TDI LATERAL 0.09 m/s    TDI SEPTAL 0.11 m/s    E/E' ratio 10.00 m/s    MV Peak A Bobby 0.83 m/s    TR Max Bobby 3.24 m/s    E/A ratio 1.20     IVRT 87.54 msec    E wave deceleration time 256.49 msec    LV SEPTAL E/E' RATIO 9.09 m/s    LV LATERAL E/E' RATIO 11.11 m/s    LA size 4.89 cm    Left Atrium Minor Axis 6.92 cm    Left Atrium Major Axis 7.90 cm    RA Major Axis 6.58 cm    AV regurgitation pressure 1/2 time 484.096063540182465 ms    AR Max Bobby 4.16 m/s    AV mean gradient 11 mmHg    AV peak gradient 20 mmHg    Ao peak bobby 2.25 m/s    Ao VTI 59.70 cm    MV stenosis pressure 1/2 time 74.38 ms    MV valve area p 1/2 method 2.96 cm2    Triscuspid Valve Regurgitation Peak Gradient 42 mmHg    PV PEAK VELOCITY 1.41 m/s    PV peak gradient 8 mmHg    Pulmonary Valve Mean Velocity 0.91 m/s    STJ 3.00 cm    IVC diameter 1.08 cm    Mean e' 0.10 m/s    ZLVIDS 0.44     ZLVIDD -2.16     EF 55 %    TV resting pulmonary artery pressure 45 mmHg    RV TB RVSP 6 mmHg    Est. RA pres 3 mmHg    Narrative      Left Ventricle: The left ventricle is normal in size. Normal wall   thickness. There is concentric hypertrophy. There is normal systolic   function. Ejection fraction by visual approximation is 55%.    Right Ventricle: Normal right ventricular cavity size. Wall thickness   is normal. Systolic function is normal.    Aortic Valve: There is mild aortic regurgitation with a centrally   directed jet.    Pulmonary Artery: The estimated pulmonary artery systolic pressure is   45 mmHg.    IVC/SVC: Normal venous pressure at 3  mmHg.     , EKG: reviewed, Stress Test: reviewed, and X-Ray: CXR: X-Ray Chest 1 View (CXR):   Results for orders placed or performed during the hospital encounter of 07/13/24   X-Ray Chest 1 View    Narrative    EXAMINATION:  XR CHEST 1 VIEW    CLINICAL HISTORY:  shortness of breath;    TECHNIQUE:  Single frontal portable view of the chest was performed.    COMPARISON:  Yesterday    FINDINGS:  Mediastinum rotated.  Pulmonary interstitial markings diminishing.  Minimal blunting of the costophrenic angles redemonstrated similar.      Impression    Diminishing vascular congestion      Electronically signed by: Sherie Carty  Date:    07/14/2024  Time:    17:30

## 2024-07-15 NOTE — CONSULTS
[facility]  Psychiatry  Telepsychiatry Consult Note    Please see previous notes:    Patient agreeable to consultation via telepsychiatry.    Tele-Consultation from Psychiatry started: 7/15/2024 at 0010  The chief complaint leading to psychiatric consultation is: depression  This consultation was requested by attending physician.  The location of the consulting psychiatrist is  [facility] .  The patient location is  [facility] MEDICAL SURGICAL UN[facility]*   Also present with the patient at the time of the consultation: rn    Patient Identification:   Kaleigh Delgado is a 74 y.o. female.    Patient information was obtained from patient.    Consults  Teleconsult Time Documentation  Subjective:     History of Present Illness:  No notes on file Kaleigh Delgado is a 74 y.o. female with a PMH has no past medical history on file. who presented to the ED for further evaluation of worsening dyspnea/shortness of breath and bilateral leg swelling x2 days duration. Patient denies prior history of CHF and is not currently on home O2 or diuretics. Given worsening symptoms, patient called EMS and was found to be hypoxic with O2 saturation 88% on room air and was initiated on supplemental oxygen at 6 L via nasal cannula but was titrated up to 15 L non-rebreather. Patient reported no known alleviating factors, and aggravating factors including laying flat and with exertion. She denied endorsing any lightheadedness, dizziness, headache, visual changes, fever, chills, sweats, nausea, vomiting, chest pain, abdominal pain, dysuria, hematuria, melena, hematochezia, diarrhea, or onset neurological deficits.     Psychiatric History:   Previous Psychiatric Hospitalizations: No   Previous Medication Trials: No   Previous Suicide Attempts: no   History of Violence: no  History of Depression: no  History of Jordana: no  History of Auditory/Visual Hallucination no  History of Delusions: no  Outpatient psychiatrist (current & past): No    Substance  "Abuse History:  Tobacco:No  Alcohol: No  Illicit Substances:No  Detox/Rehab: No    Legal History: Past charges/incarcerations: No     Family Psychiatric History: denies      Social History:  Developmental/Childhood:Achieved all developmental milestones timely  *Education:High School Diploma  Employment Status/Finances:retired  Relationship Status/Sexual Orientation: : Relationship intact  Children: 2  Housing Status: Home    history:  NO  Access to gun: NO  Buddhist:Non-practicing  Recreational activities:Time with family    Psychiatric Mental Status Exam:  Arousal: alert  Sensorium/Orientation: oriented to grossly intact  Behavior/Cooperation: normal, cooperative   Speech: normal tone, normal rate, normal pitch, normal volume  Language: grossly intact  Mood: " down "   Affect: flat  Thought Process: illogical  Thought Content:   Auditory hallucinations: no     Visual hallucinations: NO  Paranoia: NO  Delusions:  NO  Suicidal ideation: YES:      Homicidal ideation: NO  Attention/Concentration:  intact  Memory:    Recent:  Intact   Remote: Intact   3/3 immediate, 3/3 at 5 min  Fund of Knowledge: Aware of current events   Abstract reasoning: proverbs were abstract  Insight: intact  Judgment: behavior is adequate to circumstances      Past Medical History: History reviewed. No pertinent past medical history.   Laboratory Data:   Labs Reviewed   CBC W/ AUTO DIFFERENTIAL - Abnormal; Notable for the following components:       Result Value    MCH 32.7 (*)     All other components within normal limits   COMPREHENSIVE METABOLIC PANEL - Abnormal; Notable for the following components:    Chloride 111 (*)     CO2 19 (*)     Glucose 191 (*)     BUN 32 (*)     Total Bilirubin 1.3 (*)     AST 80 (*)     ALT 47 (*)     eGFR 59 (*)     All other components within normal limits   B-TYPE NATRIURETIC PEPTIDE - Abnormal; Notable for the following components:    BNP 2,367 (*)     All other components within normal limits "   D DIMER, QUANTITATIVE - Abnormal; Notable for the following components:    D-Dimer 1.30 (*)     All other components within normal limits   TROPONIN I - Abnormal; Notable for the following components:    Troponin I 0.090 (*)     All other components within normal limits   URINALYSIS, REFLEX TO URINE CULTURE - Abnormal; Notable for the following components:    Color, UA Colorless (*)     Protein, UA 1+ (*)     Occult Blood UA 1+ (*)     All other components within normal limits    Narrative:     Specimen Source->Urine   PHOSPHORUS - Abnormal; Notable for the following components:    Phosphorus 5.7 (*)     All other components within normal limits   INFLUENZA A & B BY MOLECULAR   MAGNESIUM   LACTIC ACID, PLASMA   SARS-COV-2 RNA AMPLIFICATION, QUAL   URINALYSIS MICROSCOPIC    Narrative:     Specimen Source->Urine       Neurological History:  Seizures: No  Head trauma: No    Allergies:   Review of patient's allergies indicates:  No Known Allergies    Medications in ER:   Medications   sodium chloride 0.9% flush 10 mL (has no administration in time range)   enoxaparin injection 40 mg (40 mg Subcutaneous Given 7/14/24 1710)   ondansetron injection 4 mg (has no administration in time range)   polyethylene glycol packet 17 g (17 g Oral Given 7/14/24 0857)   senna-docusate 8.6-50 mg per tablet 1 tablet (1 tablet Oral Not Given 7/14/24 2100)   hydrALAZINE injection 10 mg (10 mg Intravenous Given 7/14/24 1418)   albuterol-ipratropium 2.5 mg-0.5 mg/3 mL nebulizer solution 3 mL (has no administration in time range)   furosemide injection 40 mg (40 mg Intravenous Given 7/14/24 2124)   HYDROcodone-acetaminophen 5-325 mg per tablet 1 tablet (1 tablet Oral Given 7/14/24 2128)   acetaminophen tablet 650 mg (has no administration in time range)   losartan tablet 50 mg (has no administration in time range)   aluminum-magnesium hydroxide-simethicone 200-200-20 mg/5 mL suspension 30 mL (has no administration in time range)   melatonin  tablet 6 mg (has no administration in time range)   furosemide injection 40 mg (40 mg Intravenous Given 7/13/24 1811)   iohexoL (OMNIPAQUE 350) injection 100 mL (100 mLs Intravenous Given 7/13/24 1944)   furosemide injection 20 mg (20 mg Intravenous Given 7/14/24 1451)       Medications at home: denies    No new subjective & objective note has been filed under this hospital service since the last note was generated.      Assessment - Diagnosis - Goals:     Diagnosis/Impression: depression    Rec: outpatient referral - pt denies SIHIAVH and declined medications at this time despite worsening depression.  Pt denies suicide thoughts or intent at anytime in childhood or in the past as well.     Time with patient: 21 min      More than 50% of the time was spent counseling/coordinating care    Consulting clinician was informed of the encounter and consult note.    Consultation ended: 7/15/2024 at 0101    Jhonny Herr MD   Psychiatry  [facility]

## 2024-07-15 NOTE — HOSPITAL COURSE
Patient is a 74 year old female with a past medical history of hypertension, history of CHF, followed in the past by [facility], apparent history of afib, but now in sinus rhythm. BNP greater than 2000. Chest x-ray shows CHF. EKG shows sinus bradycardia with PAC's. There is a mild increase in troponin 0.09. Patient states history of heart cath by [facility] in 2021 that was treated medically.    Recommend continue Iv diuresis, check echocardiogram, and blood pressure control. Patient will eventually need nuclear stress test. We will need to obtain cath report from [facility].        7/15/24 pt seen and examined today, resting in bed. On supplemental O2 via NC. Denies any CP at this time. C/o aches and fatigue, tele reviewed HR 40s on monitor. Labs reviewed, -2.3L for admission, troponin 0.309, Crt 1.0. Received 4 doses of lasix since admission not on any scheduled currently. Previously followed by [facility] had LHC done in 21', she is unsure of results. will get records. Nuc stress planned for tomorrow    7/16/24 Pt seen and examined today, does not feel well. Has lots of outside stressors,  is sick. Planned on nuc stress today EKG with intermittent 2:1 AVB, pt feeling anxious will reschedule once diuresed. BNP yesterday 2727, needs diuresis.     7/17/24 pt seen and examined today, doing better off oxygen during exam sitting up in chair. Diuresing well. Labs reviewed, chart reviewed. No acute CV events reported. Tele with occ 2:1AVB will get EP consult    7/18/24-Patient seen and examined today, sitting up in bedside chair. Feeling better, less SOB. Continues to diurese well, BNP trending down. No CP. HR stable during exam. EP on board, CRT-P planned once euvolemic. Creatinine stable.     7/19/24 pt seen and examined today sitting up in bedside chair feeling better. -4L for admission. SB on tele, CRT-P planned with EP once diuresed. Crt 1.0 today    07/20/2024  Improved sob. Remain ben and AV dissociation, no dizziness faint and PND.  Cr 1.0 stable I&O -4.4 liter since admission    07/21/2024  On tele now 2:1 AV conduction, Cr stable, good UOP. SOB improved. No dizziness faint    7/22/2024  -Patient seen and examined in room, reports improvement in shortness of breath symptoms since IV diuresis. Labs reviewed, K+ 4.3, Cr 1.2, Na 140. Plan for tentative CRT P on Wednesday.     7/23/24-Patient seen and examined today. No AEON. SOB improved. Labs reviewed. Creatinine stable. BNP trending down. CRT-P planned tmw, keep NPO after MN.    7/24/24-Patient seen and examined today, resting in bed. Feels well overall. CV wise, remains stable. Labs reviewed. CRT-P planned today.    7/25/24-Patient seen and examined today, sitting up in bed, s/p CRT-P yesterday. Feels ok. Admits to some site soreness and right hip pain. SOB improved/stable. Labs reviewed. Meds adjusted.    7/26/24 pt seen and examined today, sitting up in bedside chair. Feels ok, denies any CP or SOB at this time. Awaiting placement. Labs reviewed, chart reviewed. No acute CV events reported

## 2024-07-15 NOTE — ASSESSMENT & PLAN NOTE
-HR 40s with PVCs, asymptomatic  -Cardiology following   -Repeat BNP pending  -Echo yesterday EF 55%  -planned nuc stress test in a.m.   -Avoid AV giacomo blocking agents

## 2024-07-15 NOTE — PROGRESS NOTES
[facility]  Hospital Medicine  Progress Note    Patient Name: Kaleigh Delgado  MRN: [id number]  Patient Class: IP- Inpatient   Admission Date: 7/13/2024  Length of Stay: 2 days  Attending Physician: Alina Steele MD  Primary Care Provider: Aldair Kaur MD        Subjective:     Principal Problem:CHF (congestive heart failure)        HPI:  Kaleigh Delgado is a 74 y.o. female with a PMH  has no past medical history on file. who presented to the ED for further evaluation of worsening dyspnea/shortness of breath and bilateral leg swelling x2 days duration.  Patient denies prior history of CHF and is not currently on home O2 or diuretics.  Given worsening symptoms, patient called EMS and was found to be hypoxic with O2 saturation 88% on room air and was initiated on supplemental oxygen at 6 L via nasal cannula but was titrated up to 15 L non-rebreather.  Patient reported no known alleviating factors, and aggravating factors including laying flat and with exertion.  She denied endorsing any lightheadedness, dizziness, headache, visual changes, fever, chills, sweats, nausea, vomiting, chest pain, abdominal pain, dysuria, hematuria, melena, hematochezia, diarrhea, or onset neurological deficits.  Prior to onset of symptoms, patient reported being in her usual state of health with no other concerns or complaints.  All other review of systems negative except as noted above.  Initial workup in the ED revealed patient to be tachypneic and hypoxic with elevated D-dimer measuring 1.30.  BNP 03/20/2067, troponin 0.090, flu/COVID negative, UA negative for UTI. CTA positive for mild to moderate pulmonary edema, mild left pleural effusion, small right pleural effusion, but negative for pulmonary embolus.  Patient admitted to Hospital Medicine inpatient for continued medical management and workup of new onset heart failure.    PCP: No, Primary Doctor      Overview/Hospital Course:  74 y.o. female with no past medical  history on file admitted for new onset of CHF. Patient denies history of CHF or home oxygen use. Patient is currently for Hctz 12.5 mg daily for blood pressure management. Ed work up revealed elevated D-dimer measuring 1.30. BNP 2367, troponin 0.090, flu/COVID negative, UA negative for UT  CTA positive for mild to moderate pulmonary edema, mild left pleural effusion, small right pleural effusion, but negative for pulmonary embolus. CXR showed cardiomegaly with perihilar edema. Correlate clinically to CHF. Trace pleural effusions. Correlate clinically to CHF. Echo resulted with a 55% EF. Cardiology following.    Patient bradycardic, STAT EKG. Elevated BP. PRN IV hydralazine ordered. Reported increased SOB after hydralazine administered. Gave lasix 20 mg IV once, then 40 mg IV x2 doses. Started losartan 50 mg for BP control. STAT CXR and supplemental oxygen PRN. Trend troponin.   Cardiology consulted  Nuclear stress test in a.m. per cardiology     Interval History: f/u SOB. Patient awake and alert. NAD. Reports SOB is improving, remains of supplemental oxygen. Will wean as tolerated. Denies any CP, abdominal pain or nausea/vomiting. Cardiology planned stress test in a.m. Patient NPO at midnight.     Review of Systems  Objective:     Vital Signs (Most Recent):  Temp: 98.4 °F (36.9 °C) (07/15/24 1129)  Pulse: (!) 43 (07/15/24 1129)  Resp: 18 (07/15/24 1129)  BP: (!) 137/57 (07/15/24 1129)  SpO2: 96 % (07/15/24 1129) Vital Signs (24h Range):  Temp:  [97.5 °F (36.4 °C)-98.4 °F (36.9 °C)] 98.4 °F (36.9 °C)  Pulse:  [39-49] 43  Resp:  [17-18] 18  SpO2:  [95 %-98 %] 96 %  BP: (130-192)/(57-74) 137/57     Weight: 70.2 kg (154 lb 12.2 oz)  Body mass index is 22.21 kg/m².    Intake/Output Summary (Last 24 hours) at 7/15/2024 1551  Last data filed at 7/15/2024 1053  Gross per 24 hour   Intake 120 ml   Output 1950 ml   Net -1830 ml         Physical Exam  Vitals and nursing note reviewed.   Constitutional:       General: She is  not in acute distress.     Appearance: She is ill-appearing (chronically).   HENT:      Mouth/Throat:      Mouth: Mucous membranes are moist.      Pharynx: Oropharynx is clear.   Eyes:      Pupils: Pupils are equal, round, and reactive to light.   Cardiovascular:      Rate and Rhythm: Regular rhythm. Bradycardia present.      Heart sounds: No murmur heard.  Pulmonary:      Effort: Pulmonary effort is normal.      Breath sounds: No wheezing.      Comments:  Bilateral lung base crackles  Abdominal:      General: Bowel sounds are normal. There is no distension.      Palpations: Abdomen is soft.      Tenderness: There is no abdominal tenderness.   Musculoskeletal:         General: Normal range of motion.      Right lower leg: Edema (+1) present.      Left lower leg: Edema (+1) present.   Skin:     General: Skin is warm and dry.      Comments: Chronic venous stasis bilaterally     Neurological:      General: No focal deficit present.      Mental Status: She is alert and oriented to person, place, and time.      Motor: Weakness (generalized) present.             Significant Labs: All pertinent labs within the past 24 hours have been reviewed.  Recent Lab Results         07/15/24  1437   07/15/24  0818   07/14/24  1716        Allens Test     Pass       Anion Gap   11         Baso #   0.02         Basophil %   0.3         BNP 2,727  Comment: Values of less than 100 pg/ml are consistent with non-CHF populations.           Site     RR       BUN   31         Calcium   8.4         Chloride   107         CO2   23         Creatinine   1.0         DelSys     Nasal Can       Differential Method   Automated         eGFR   59         Eos #   0.0         Eos %   0.3         Flow     2       Glucose   108         Gran # (ANC)   6.0         Gran %   84.8         Hematocrit   34.8         Hemoglobin   11.6         Immature Grans (Abs)   0.02  Comment: Mild elevation in immature granulocytes is non specific and   can be seen in a variety  of conditions including stress response,   acute inflammation, trauma and pregnancy. Correlation with other   laboratory and clinical findings is essential.           Immature Granulocytes   0.3         Lymph #   0.6         Lymph %   7.9         Magnesium    1.9         MCH   32.4         MCHC   33.3         MCV   97         Mode     SPONT       Mono #   0.5         Mono %   6.4         MPV   11.4         nRBC   0         Phosphorus Level   4.0         Platelet Count   144         POC BE     -1       POC HCO3     22.5       POC PCO2     32.4       POC PH     7.450       POC PO2     72       POC SATURATED O2     95       Potassium   3.2         RBC   3.58         RDW   13.2         Sample     ARTERIAL       Sodium   141         Troponin I   0.203  Comment: The reference interval for Troponin I represents the 99th percentile   cutoff   for our facility and is consistent with 3rd generation assay   performance.           WBC   7.08                 Significant Imaging: I have reviewed all pertinent imaging results/findings within the past 24 hours.    Assessment/Plan:      * CHF (congestive heart failure)  Patient is identified as having  evidence of new onset  heart failure that is Acute. CHF is currently uncontrolled due to Continued edema of extremities, Dyspnea not returned to baseline after single doses of IV diuretic, >3 pillow orthopnea, Rales/crackles on pulmonary exam, and Pulmonary edema/pleural effusion on CXR. Latest ECHO performed and demonstrates 55% EF  Continue Furosemide and monitor clinical status closely. Monitor on telemetry. Patient is on CHF pathway.  Monitor strict Is&Os and daily weights.  Place on fluid restriction of 1.5 L. Cardiology has been consulted. Continue to stress to patient importance of self efficacy and  on diet for CHF. Last BNP reviewed- and noted below   Recent Labs   Lab 07/15/24  1437   BNP 2,727*     -Cardiology consulted   -Planned Nuc stress test in a.m.   -pt npo at  midnight     Bradycardia  -HR 40s with PVCs, asymptomatic  -Cardiology following   -Repeat BNP pending  -Echo yesterday EF 55%  -planned nuc stress test in a.m.   -Avoid AV giacomo blocking agents    Acute hypoxemic respiratory failure  Patient with Hypoxic Respiratory failure which is Acute.  she is not on home oxygen. Supplemental oxygen was provided and noted- Oxygen Concentration (%):  [32] 32    Signs/symptoms of respiratory failure include- tachypnea, increased work of breathing, and respiratory distress. Contributing diagnoses includes - CHF, Pleural effusion, Pneumonia, and Pulmonary Embolus Labs and images were reviewed. Patient Has not had a recent ABG. Will treat underlying causes and adjust management of respiratory failure as follows:  Plan:  -Continue treatment and workup of CHF  -Titrate oxygen requirements as needed  -Incentive spirometry   -Monitor pulse oximetry  -Nohemy blairn      Hypertension  Chronic, uncontrolled. Latest blood pressure and vitals reviewed-     Temp:  [97.5 °F (36.4 °C)-98.4 °F (36.9 °C)]   Pulse:  [39-49]   Resp:  [17-18]   BP: (130-192)/(57-74)   SpO2:  [95 %-98 %] .   Home meds for hypertension were reviewed and noted below.   Hypertension Medications               hydroCHLOROthiazide (MICROZIDE) 12.5 mg capsule Take 1 capsule by mouth every morning.     While in the hospital, will manage blood pressure as follows; Continue home antihypertensive regimen    Will utilize p.r.n. blood pressure medication only if patient's blood pressure greater than 160/100 and she develops symptoms such as worsening chest pain or shortness of breath.      Shortness of breath  - IV lasix  - supplemental oxygen      VTE Risk Mitigation (From admission, onward)           Ordered     enoxaparin injection 40 mg  Daily         07/13/24 2334     IP VTE HIGH RISK PATIENT  Once         07/13/24 2334     Place sequential compression device  Until discontinued         07/13/24 2334                     Discharge Planning   DUTCH:      Code Status: Full Code   Is the patient medically ready for discharge?:     Reason for patient still in hospital (select all that apply): Patient trending condition, Laboratory test, Treatment, and Consult recommendations  Discharge Plan A: Home          The patient's case has been reviewed by my supervising physician.   Supervising physician will provide additional orders and recommendations at his/her discretion.  Please see supervising physicians documentation and/or orders for further details.           Sammi Mark NP  Department of Hospital Medicine   [facility]

## 2024-07-15 NOTE — CONSULTS
"                    Food & Nutrition Education    Diet Education: Heart Failure  Time Spent: 15 minutes.    Learners: Pt    Nutrition Education provided with handouts:  Healthy-Heart Nutrition Therapy, Fluid-Restricted Diet, Low-Sodium Nutrition Therapy ([email or web address])    Patient Active Problem List   Diagnosis    CHF (congestive heart failure)    Shortness of breath    Hypertension    Acute hypoxemic respiratory failure    Bradycardia     History reviewed. No pertinent past medical history.    Comments:  Dietitian educated patient on low sodium diet and fluid restriction related to hospital diagnosis. Discussed the importance of limiting sodium to 2,000 mg per day and reading food labels to avoid further complications of CHF. Discussed using salt free seasonings (Mrs. Mathis and Connie Chachere "No Salt") and other herbs and spices in meals to enhance flavor without additional sodium. Discussed 1500 ml fluid restriction per MD and dietary sources of fluid. Dietitian recommended using a cup with measurements for fluids and to try to consume small sips spread throughout the day rather than a lot at one time.    The pt remained engaged throughout entire nutrition education. She states in 2021 she was recommend to follow fluid restrictions however, has not followed them as of recently. She states she understands the information presented and will once again begin accounting for all sources of fluids/sodium throughout the day.The pt states she has no additional questions currently and will reach out through provided contacts if she does. The pt states she believes her appetite is improving, and states does not want ONS. Dietitian will continue to follow pt and monitor nutritional status during current admission.     NFPE not performed, will perform during RD follow up as wrranted.  All questions and concerns answered.  Provided handout with dietitian's contact information.  Please re-consult as needed.    Thank " You!   Marcus Whitaker MS, Registration Eligible, Provisional LDN

## 2024-07-15 NOTE — PROGRESS NOTES
"[facility]) nurse navigator notified of HFT[facility] candidate in need of education and introduction to 4-6 week program.      PT aao x 3 while lying in bed. Introduced self to pt as HFT[facility] nurse navigator.     Patient given "Home Care Guide for Heart Failure Patients" , "[facility]" flyer and "Daily weight and symptom tracker".  Encouraged pt  to review information.      Reviewed the following key points of HFT[facility] program with pt and family:   1.) Take your medications as directed.    2.) Weight yourself daily   3.) Follow low salt and limited fluid diet.    4.) Stop smoking and start exercising   5.) Go to your appointments and call your team.      Pt reminded to follow Symptom tracker and to call at the onset of symptoms according to tracker.     Reviewed plan for follow up once discharged to include phone calls, in person and virtual visits to assist pt optimizing their heart failure medication regimen and encouraging healthy lifestyle modifications.  Reminded pt that program will assist them over the next 4-6 weeks and then patient will be transferred to long term care provider .  Reminded pt how to contact HFTCC navigator via phone and or via [email or web address].     Pt given appointment or instructed appointment will be printed on hospital discharge paperwork.     Pt also reminded HF nurse will call 48-72 hours after discharge to check on them.     PT verbalize read back of information given.  Encouraged pt and family to read over information often and contact team with any questions or concerns.      "

## 2024-07-15 NOTE — PROGRESS NOTES
[facility]  Cardiology  Progress Note    Patient Name: Kaleigh Delgado  MRN: [id number]  Admission Date: 7/13/2024  Hospital Length of Stay: 2 days  Code Status: Full Code   Attending Physician: Alina Steele MD   Primary Care Physician: Aldair Kaur MD  Expected Discharge Date:   Principal Problem:CHF (congestive heart failure)    Subjective:   Patient is a 74 year old female with a past medical history of hypertension, history of CHF, followed in the past by [facility], apparent history of afib, but now in sinus rhythm. BNP greater than 2000. Chest x-ray shows CHF. EKG shows sinus bradycardia with PAC's. There is a mild increase in troponin 0.09. Patient states history of heart cath by [facility] in 2021 that was treated medically.    Recommend continue Iv diuresis, check echocardiogram, and blood pressure control. Patient will eventually need nuclear stress test. We will need to obtain cath report from [facility].      History reviewed. No pertinent past medical history.  Hospital Course:   7/15/24 pt seen and examined today, resting in bed. On supplemental O2 via NC. Denies any CP at this time. C/o aches and fatigue, tele reviewed HR 40s on monitor. Labs reviewed, -2.3L for admission, troponin 0.309, Crt 1.0. Received 4 doses of lasix since admission not on any scheduled currently. Previously followed by [facility] had LHC done in 21', she is unsure of results. will get records. Nuc stress planned for tomorrow        Review of Systems   Constitutional: Positive for malaise/fatigue.   HENT: Negative.     Eyes: Negative.    Cardiovascular: Negative.    Respiratory:  Positive for shortness of breath.    Skin: Negative.    Musculoskeletal: Negative.    Gastrointestinal: Negative.    Genitourinary: Negative.    Neurological:  Positive for weakness.   Psychiatric/Behavioral: Negative.       Objective:     Vital Signs (Most Recent):  Temp: 98.4 °F (36.9 °C) (07/15/24 1129)  Pulse: (!) 43 (07/15/24 1129)  Resp: 18 (07/15/24  1129)  BP: (!) 137/57 (07/15/24 1129)  SpO2: 96 % (07/15/24 1129) Vital Signs (24h Range):  Temp:  [97.5 °F (36.4 °C)-98.4 °F (36.9 °C)] 98.4 °F (36.9 °C)  Pulse:  [39-49] 43  Resp:  [17-18] 18  SpO2:  [95 %-98 %] 96 %  BP: (130-192)/(57-74) 137/57     Weight: 70.2 kg (154 lb 12.2 oz)  Body mass index is 22.21 kg/m².     SpO2: 96 %         Intake/Output Summary (Last 24 hours) at 7/15/2024 1345  Last data filed at 7/15/2024 1053  Gross per 24 hour   Intake 270 ml   Output 1950 ml   Net -1680 ml       Lines/Drains/Airways       Peripheral Intravenous Line  Duration                  Peripheral IV - Single Lumen 07/13/24 1800 18 G 1 1/4 in Anterior;Proximal;Right Forearm 1 day         Peripheral IV - Single Lumen 07/13/24 1805 20 G 1 1/4 in Left Antecubital 1 day                       Physical Exam  Vitals and nursing note reviewed.   Constitutional:       Appearance: Normal appearance.   HENT:      Head: Normocephalic.   Eyes:      Pupils: Pupils are equal, round, and reactive to light.   Cardiovascular:      Rate and Rhythm: Normal rate and regular rhythm.      Heart sounds: Normal heart sounds, S1 normal and S2 normal. No murmur heard.     No S3 or S4 sounds.   Pulmonary:      Effort: Pulmonary effort is normal.      Breath sounds: Rales present.   Abdominal:      General: Bowel sounds are normal.      Palpations: Abdomen is soft.   Musculoskeletal:         General: Tenderness present. Normal range of motion.      Cervical back: Normal range of motion.   Skin:     Capillary Refill: Capillary refill takes less than 2 seconds.   Neurological:      General: No focal deficit present.      Mental Status: She is alert and oriented to person, place, and time.   Psychiatric:         Mood and Affect: Mood is anxious.         Behavior: Behavior normal.         Thought Content: Thought content normal.            Significant Labs: BMP:   Recent Labs   Lab 07/13/24  1811 07/14/24  0350 07/15/24  0818   * 100 108     "142 141   K 4.1 4.0 3.2*   * 108 107   CO2 19* 21* 23   BUN 32* 30* 31*   CREATININE 1.0 0.8 1.0   CALCIUM 9.1 9.2 8.4*   MG 2.3  --  1.9   , CMP   Recent Labs   Lab 07/13/24  1811 07/14/24  0350 07/15/24  0818    142 141   K 4.1 4.0 3.2*   * 108 107   CO2 19* 21* 23   * 100 108   BUN 32* 30* 31*   CREATININE 1.0 0.8 1.0   CALCIUM 9.1 9.2 8.4*   PROT 6.5  --   --    ALBUMIN 3.5  --   --    BILITOT 1.3*  --   --    ALKPHOS 132  --   --    AST 80*  --   --    ALT 47*  --   --    ANIONGAP 12 13 11   , CBC   Recent Labs   Lab 07/13/24  1811 07/15/24  0818   WBC 6.10 7.08   HGB 13.5 11.6*   HCT 40.2 34.8*    144*   , INR No results for input(s): "INR", "PROTIME" in the last 48 hours., Lipid Panel No results for input(s): "CHOL", "HDL", "LDLCALC", "TRIG", "CHOLHDL" in the last 48 hours., Troponin   Recent Labs   Lab 07/13/24  1811 07/14/24  1542 07/15/24  0818   TROPONINI 0.090* 0.309* 0.203*   , and All pertinent lab results from the last 24 hours have been reviewed.    Significant Imaging: Cardiac Cath: reviewed, Echocardiogram: Transthoracic echo (TTE) complete (Cupid Only):   Results for orders placed or performed during the hospital encounter of 07/13/24   Echo   Result Value Ref Range    BSA 1.86 m2    LVIDd 4.20 3.5 - 6.0 cm    LV Systolic Volume 47.52 mL    LV Systolic Volume Index 25.4 mL/m2    LVIDs 3.40 2.1 - 4.0 cm    LV Diastolic Volume 78.48 mL    LV Diastolic Volume Index 41.97 mL/m2    Left Ventricular End Systolic Volume by Teichholz Method 47.52 mL    Left Ventricular End Diastolic Volume by Teichholz Method 78.48 mL    IVS 1.53 (A) 0.6 - 1.1 cm    LVOT diameter 2.07 cm    LVOT area 3.4 cm2    FS 19 (A) 28 - 44 %    Left Ventricle Relative Wall Thickness 0.70 cm    Posterior Wall 1.47 (A) 0.6 - 1.1 cm    LV mass 249.50 g    LV Mass Index 133 g/m2    MV Peak E Bobby 1.00 m/s    TDI LATERAL 0.09 m/s    TDI SEPTAL 0.11 m/s    E/E' ratio 10.00 m/s    MV Peak A Bobby 0.83 m/s    TR " Max Bobby 3.24 m/s    E/A ratio 1.20     IVRT 87.54 msec    E wave deceleration time 256.49 msec    LV SEPTAL E/E' RATIO 9.09 m/s    LV LATERAL E/E' RATIO 11.11 m/s    LA size 4.89 cm    Left Atrium Minor Axis 6.92 cm    Left Atrium Major Axis 7.90 cm    RA Major Axis 6.58 cm    AV regurgitation pressure 1/2 time 484.127725826709452 ms    AR Max Bobby 4.16 m/s    AV mean gradient 11 mmHg    AV peak gradient 20 mmHg    Ao peak bobby 2.25 m/s    Ao VTI 59.70 cm    MV stenosis pressure 1/2 time 74.38 ms    MV valve area p 1/2 method 2.96 cm2    Triscuspid Valve Regurgitation Peak Gradient 42 mmHg    PV PEAK VELOCITY 1.41 m/s    PV peak gradient 8 mmHg    Pulmonary Valve Mean Velocity 0.91 m/s    STJ 3.00 cm    IVC diameter 1.08 cm    Mean e' 0.10 m/s    ZLVIDS 0.44     ZLVIDD -2.16     EF 55 %    TV resting pulmonary artery pressure 45 mmHg    RV TB RVSP 6 mmHg    Est. RA pres 3 mmHg    Narrative      Left Ventricle: The left ventricle is normal in size. Normal wall   thickness. There is concentric hypertrophy. There is normal systolic   function. Ejection fraction by visual approximation is 55%.    Right Ventricle: Normal right ventricular cavity size. Wall thickness   is normal. Systolic function is normal.    Aortic Valve: There is mild aortic regurgitation with a centrally   directed jet.    Pulmonary Artery: The estimated pulmonary artery systolic pressure is   45 mmHg.    IVC/SVC: Normal venous pressure at 3 mmHg.     , EKG: reviewed, Stress Test: reviewed, and X-Ray: CXR: X-Ray Chest 1 View (CXR):   Results for orders placed or performed during the hospital encounter of 07/13/24   X-Ray Chest 1 View    Narrative    EXAMINATION:  XR CHEST 1 VIEW    CLINICAL HISTORY:  shortness of breath;    TECHNIQUE:  Single frontal portable view of the chest was performed.    COMPARISON:  Yesterday    FINDINGS:  Mediastinum rotated.  Pulmonary interstitial markings diminishing.  Minimal blunting of the costophrenic angles  redemonstrated similar.      Impression    Diminishing vascular congestion      Electronically signed by: Sherie Carty  Date:    07/14/2024  Time:    17:30     Assessment and Plan:       * CHF (congestive heart failure)  BNP 2367, received IV lasix x4. On nasal cannula  GDMT ARB, not on BB given bradycardia  Kidney function stable  Repeat BNP pending  Echo yesterday EF 55%  Nuc stress tomorrow  F/u in [facility]      Bradycardia  HR 40s with PVCs, asymptomatic  Avoid AV giacomo blocking agents  F/u with primary cardiologist to monitor  Home med list with only HCTZ    Acute hypoxemic respiratory failure  Management per primary team    Hypertension  Titrate medications, cont ARB    Shortness of breath  Improving since admission  BNP pending  Wean O2 as tolerated        VTE Risk Mitigation (From admission, onward)           Ordered     enoxaparin injection 40 mg  Daily         07/13/24 2334     IP VTE HIGH RISK PATIENT  Once         07/13/24 2334     Place sequential compression device  Until discontinued         07/13/24 2334                    Yaa Salas, NP  Cardiology  [facility]

## 2024-07-15 NOTE — PLAN OF CARE
Problem: Adult Inpatient Plan of Care  Goal: Plan of Care Review  7/15/2024 1853 by Rhoda Cyr, RN  Outcome: Progressing  7/15/2024 1544 by Rhoda Cyr, RN  Outcome: Progressing

## 2024-07-15 NOTE — ASSESSMENT & PLAN NOTE
HR 40s with PVCs, asymptomatic  Avoid AV giacomo blocking agents  F/u with primary cardiologist to monitor  Home med list with only HCTZ

## 2024-07-16 LAB
ANION GAP SERPL CALC-SCNC: 9 MMOL/L (ref 8–16)
BASOPHILS # BLD AUTO: 0.02 K/UL (ref 0–0.2)
BASOPHILS NFR BLD: 0.3 % (ref 0–1.9)
BUN SERPL-MCNC: 34 MG/DL (ref 8–23)
CALCIUM SERPL-MCNC: 8.5 MG/DL (ref 8.7–10.5)
CHLORIDE SERPL-SCNC: 109 MMOL/L (ref 95–110)
CO2 SERPL-SCNC: 24 MMOL/L (ref 23–29)
CREAT SERPL-MCNC: 1.2 MG/DL (ref 0.5–1.4)
DIFFERENTIAL METHOD BLD: ABNORMAL
EOSINOPHIL # BLD AUTO: 0.1 K/UL (ref 0–0.5)
EOSINOPHIL NFR BLD: 1.2 % (ref 0–8)
ERYTHROCYTE [DISTWIDTH] IN BLOOD BY AUTOMATED COUNT: 13.2 % (ref 11.5–14.5)
EST. GFR  (NO RACE VARIABLE): 48 ML/MIN/1.73 M^2
GLUCOSE SERPL-MCNC: 95 MG/DL (ref 70–110)
HCT VFR BLD AUTO: 33 % (ref 37–48.5)
HGB BLD-MCNC: 10.9 G/DL (ref 12–16)
IMM GRANULOCYTES # BLD AUTO: 0.01 K/UL (ref 0–0.04)
IMM GRANULOCYTES NFR BLD AUTO: 0.2 % (ref 0–0.5)
LYMPHOCYTES # BLD AUTO: 1.1 K/UL (ref 1–4.8)
LYMPHOCYTES NFR BLD: 18.9 % (ref 18–48)
MAGNESIUM SERPL-MCNC: 2.1 MG/DL (ref 1.6–2.6)
MCH RBC QN AUTO: 32.1 PG (ref 27–31)
MCHC RBC AUTO-ENTMCNC: 33 G/DL (ref 32–36)
MCV RBC AUTO: 97 FL (ref 82–98)
MONOCYTES # BLD AUTO: 0.7 K/UL (ref 0.3–1)
MONOCYTES NFR BLD: 11.3 % (ref 4–15)
NEUTROPHILS # BLD AUTO: 3.9 K/UL (ref 1.8–7.7)
NEUTROPHILS NFR BLD: 68.1 % (ref 38–73)
NRBC BLD-RTO: 0 /100 WBC
PHOSPHATE SERPL-MCNC: 4.5 MG/DL (ref 2.7–4.5)
PLATELET # BLD AUTO: 153 K/UL (ref 150–450)
PMV BLD AUTO: 11.6 FL (ref 9.2–12.9)
POTASSIUM SERPL-SCNC: 3.8 MMOL/L (ref 3.5–5.1)
RBC # BLD AUTO: 3.4 M/UL (ref 4–5.4)
SODIUM SERPL-SCNC: 142 MMOL/L (ref 136–145)
WBC # BLD AUTO: 5.76 K/UL (ref 3.9–12.7)

## 2024-07-16 PROCEDURE — 63600175 PHARM REV CODE 636 W HCPCS

## 2024-07-16 PROCEDURE — 80048 BASIC METABOLIC PNL TOTAL CA: CPT | Performed by: HOSPITALIST

## 2024-07-16 PROCEDURE — 99233 SBSQ HOSP IP/OBS HIGH 50: CPT | Mod: ,,,

## 2024-07-16 PROCEDURE — 83735 ASSAY OF MAGNESIUM: CPT

## 2024-07-16 PROCEDURE — 63600175 PHARM REV CODE 636 W HCPCS: Performed by: HOSPITALIST

## 2024-07-16 PROCEDURE — 36415 COLL VENOUS BLD VENIPUNCTURE: CPT | Performed by: HOSPITALIST

## 2024-07-16 PROCEDURE — 99900035 HC TECH TIME PER 15 MIN (STAT)

## 2024-07-16 PROCEDURE — 84100 ASSAY OF PHOSPHORUS: CPT

## 2024-07-16 PROCEDURE — 25000003 PHARM REV CODE 250

## 2024-07-16 PROCEDURE — 25000003 PHARM REV CODE 250: Performed by: INTERNAL MEDICINE

## 2024-07-16 PROCEDURE — 94761 N-INVAS EAR/PLS OXIMETRY MLT: CPT

## 2024-07-16 PROCEDURE — 11000001 HC ACUTE MED/SURG PRIVATE ROOM

## 2024-07-16 PROCEDURE — 85025 COMPLETE CBC W/AUTO DIFF WBC: CPT

## 2024-07-16 RX ORDER — POTASSIUM CHLORIDE 20 MEQ/1
40 TABLET, EXTENDED RELEASE ORAL 2 TIMES DAILY
Status: DISCONTINUED | OUTPATIENT
Start: 2024-07-16 | End: 2024-07-16

## 2024-07-16 RX ORDER — FUROSEMIDE 10 MG/ML
40 INJECTION INTRAMUSCULAR; INTRAVENOUS EVERY 12 HOURS
Status: DISCONTINUED | OUTPATIENT
Start: 2024-07-16 | End: 2024-07-25

## 2024-07-16 RX ORDER — POTASSIUM CHLORIDE 20 MEQ/1
40 TABLET, EXTENDED RELEASE ORAL 2 TIMES DAILY
Status: COMPLETED | OUTPATIENT
Start: 2024-07-16 | End: 2024-07-17

## 2024-07-16 RX ADMIN — HYDROCODONE BITARTRATE AND ACETAMINOPHEN 1 TABLET: 5; 325 TABLET ORAL at 01:07

## 2024-07-16 RX ADMIN — FUROSEMIDE 40 MG: 10 INJECTION, SOLUTION INTRAMUSCULAR; INTRAVENOUS at 02:07

## 2024-07-16 RX ADMIN — HYDRALAZINE HYDROCHLORIDE 10 MG: 20 INJECTION, SOLUTION INTRAMUSCULAR; INTRAVENOUS at 09:07

## 2024-07-16 RX ADMIN — ENOXAPARIN SODIUM 40 MG: 40 INJECTION SUBCUTANEOUS at 05:07

## 2024-07-16 RX ADMIN — FUROSEMIDE 40 MG: 10 INJECTION, SOLUTION INTRAMUSCULAR; INTRAVENOUS at 09:07

## 2024-07-16 RX ADMIN — POTASSIUM CHLORIDE 40 MEQ: 1500 TABLET, EXTENDED RELEASE ORAL at 05:07

## 2024-07-16 RX ADMIN — HYDRALAZINE HYDROCHLORIDE 10 MG: 20 INJECTION, SOLUTION INTRAMUSCULAR; INTRAVENOUS at 01:07

## 2024-07-16 NOTE — PROGRESS NOTES
[facility]  Hospital Medicine  Progress Note    Patient Name: Kaleigh Delgado  MRN: [id number]  Patient Class: IP- Inpatient   Admission Date: 7/13/2024  Length of Stay: 3 days  Attending Physician: Alina Steele MD  Primary Care Provider: Aldair Kaur MD        Subjective:     Principal Problem:CHF (congestive heart failure)        HPI:  Kaleigh Delgado is a 74 y.o. female with a PMH  has no past medical history on file. who presented to the ED for further evaluation of worsening dyspnea/shortness of breath and bilateral leg swelling x2 days duration.  Patient denies prior history of CHF and is not currently on home O2 or diuretics.  Given worsening symptoms, patient called EMS and was found to be hypoxic with O2 saturation 88% on room air and was initiated on supplemental oxygen at 6 L via nasal cannula but was titrated up to 15 L non-rebreather.  Patient reported no known alleviating factors, and aggravating factors including laying flat and with exertion.  She denied endorsing any lightheadedness, dizziness, headache, visual changes, fever, chills, sweats, nausea, vomiting, chest pain, abdominal pain, dysuria, hematuria, melena, hematochezia, diarrhea, or onset neurological deficits.  Prior to onset of symptoms, patient reported being in her usual state of health with no other concerns or complaints.  All other review of systems negative except as noted above.  Initial workup in the ED revealed patient to be tachypneic and hypoxic with elevated D-dimer measuring 1.30.  BNP 03/20/2067, troponin 0.090, flu/COVID negative, UA negative for UTI. CTA positive for mild to moderate pulmonary edema, mild left pleural effusion, small right pleural effusion, but negative for pulmonary embolus.  Patient admitted to Hospital Medicine inpatient for continued medical management and workup of new onset heart failure.    PCP: No, Primary Doctor      Overview/Hospital Course:  74 y.o. female with no past medical  history on file admitted for new onset of CHF. Patient denies history of CHF or home oxygen use. Patient is currently for Hctz 12.5 mg daily for blood pressure management. Ed work up revealed elevated D-dimer measuring 1.30. BNP 2367, troponin 0.090, flu/COVID negative, UA negative for UT  CTA positive for mild to moderate pulmonary edema, mild left pleural effusion, small right pleural effusion, but negative for pulmonary embolus. CXR showed cardiomegaly with perihilar edema. Correlate clinically to CHF. Trace pleural effusions. Correlate clinically to CHF. Echo resulted with a 55% EF. Cardiology following.    Patient bradycardic, STAT EKG. Elevated BP. PRN IV hydralazine ordered. Reported increased SOB after hydralazine administered. Gave lasix 20 mg IV once, then 40 mg IV x2 doses. Started losartan 50 mg for BP control. STAT CXR and supplemental oxygen PRN.    Trend troponin, 0.090> 0.309 >0.203   BNP up trending to 2727  Cardiology consulted  Nuclear stress test- cancelled per cardiology   PT/OT to eval and treat   Home O2 eval   IV lasix 40mg q12h    Interval History: f/u SOB. Patient awake and alert. NAD. Patient reports mild SOB, continues to require 3L NC. Patient denies any nausea or vomiting. Reports Right sided CP.Nuclear stress test cancelled per cardiology. Started on IV lasix 40mg q12h per cardiology as BNP has continued to increase. Pt/ot to eval and treat. Home O2 eval.     Review of Systems  Objective:     Vital Signs (Most Recent):  Temp: 98.7 °F (37.1 °C) (07/16/24 1122)  Pulse: (!) 47 (07/16/24 1122)  Resp: 18 (07/16/24 1122)  BP: (!) 155/60 (07/16/24 1122)  SpO2: 97 % (07/16/24 1122) Vital Signs (24h Range):  Temp:  [98.1 °F (36.7 °C)-98.8 °F (37.1 °C)] 98.7 °F (37.1 °C)  Pulse:  [41-58] 47  Resp:  [17-18] 18  SpO2:  [94 %-97 %] 97 %  BP: (115-198)/(53-74) 155/60     Weight: 66.5 kg (146 lb 9.7 oz)  Body mass index is 21.04 kg/m².  No intake or output data in the 24 hours ending 07/16/24 3078       Physical Exam  Vitals and nursing note reviewed.   Constitutional:       General: She is not in acute distress.     Appearance: She is ill-appearing (chronically).   HENT:      Mouth/Throat:      Mouth: Mucous membranes are moist.      Pharynx: Oropharynx is clear.   Eyes:      Pupils: Pupils are equal, round, and reactive to light.   Cardiovascular:      Rate and Rhythm: Regular rhythm. Bradycardia present.      Heart sounds: No murmur heard.  Pulmonary:      Effort: Pulmonary effort is normal.      Breath sounds: No wheezing.      Comments: Bilateral lung base crackles   Abdominal:      General: Bowel sounds are normal. There is no distension.      Palpations: Abdomen is soft.      Tenderness: There is no abdominal tenderness.   Musculoskeletal:         General: Normal range of motion.      Right lower leg: Edema (+1) present.      Left lower leg: Edema (+1) present.   Skin:     General: Skin is warm and dry.      Comments: BLE chronic venous statis     Neurological:      General: No focal deficit present.      Mental Status: She is alert and oriented to person, place, and time.             Significant Labs: All pertinent labs within the past 24 hours have been reviewed.  Recent Lab Results         07/16/24  0547   07/15/24  1437        Anion Gap 9         Baso # 0.02         Basophil % 0.3         BNP   2,727  Comment: Values of less than 100 pg/ml are consistent with non-CHF populations.       BUN 34         Calcium 8.5         Chloride 109         CO2 24         Creatinine 1.2         Differential Method Automated         eGFR 48         Eos # 0.1         Eos % 1.2         Glucose 95         Gran # (ANC) 3.9         Gran % 68.1         Hematocrit 33.0         Hemoglobin 10.9         Immature Grans (Abs) 0.01  Comment: Mild elevation in immature granulocytes is non specific and   can be seen in a variety of conditions including stress response,   acute inflammation, trauma and pregnancy. Correlation with other    laboratory and clinical findings is essential.           Immature Granulocytes 0.2         Lymph # 1.1         Lymph % 18.9         Magnesium  2.1         MCH 32.1         MCHC 33.0         MCV 97         Mono # 0.7         Mono % 11.3         MPV 11.6         nRBC 0         Phosphorus Level 4.5         Platelet Count 153         Potassium 3.8         RBC 3.40         RDW 13.2         Sodium 142         WBC 5.76                 Significant Imaging: I have reviewed all pertinent imaging results/findings within the past 24 hours.    Assessment/Plan:      * CHF (congestive heart failure)  Patient is identified as having  evidence of new onset  heart failure that is Acute. CHF is currently uncontrolled due to Continued edema of extremities, Dyspnea not returned to baseline after single doses of IV diuretic, >3 pillow orthopnea, Rales/crackles on pulmonary exam, and Pulmonary edema/pleural effusion on CXR. Latest ECHO performed and demonstrates 55% EF  Continue Furosemide and monitor clinical status closely. Monitor on telemetry. Patient is on CHF pathway.  Monitor strict Is&Os and daily weights.  Place on fluid restriction of 1.5 L. Cardiology has been consulted. Continue to stress to patient importance of self efficacy and  on diet for CHF. Last BNP reviewed- and noted below   Recent Labs   Lab 07/15/24  1437   BNP 2,727*     -Cardiology consulted   -ECHO: Ejection fraction 55%   -Planned Nuc stress test cancelled per cardiology   -IV lasix 40mg q12h   -Wean O2 as tolerated   -pt/ot to eval and treat     Bradycardia  -HR 40s with PVCs, asymptomatic  -Cardiology following   -Repeat BNP pending  -Echo yesterday EF 55%  -planned nuc stress test in a.m.   -Avoid AV giacomo blocking agents    Acute hypoxemic respiratory failure  Patient with Hypoxic Respiratory failure which is Acute.  she is not on home oxygen. Supplemental oxygen was provided and noted-      Signs/symptoms of respiratory failure include- tachypnea,  increased work of breathing, and respiratory distress. Contributing diagnoses includes - CHF, Pleural effusion, Pneumonia, and Pulmonary Embolus Labs and images were reviewed. Patient Has not had a recent ABG. Will treat underlying causes and adjust management of respiratory failure as follows:  Plan:  -Continue treatment and workup of CHF  -Titrate oxygen requirements as needed  -Incentive spirometry   -Monitor pulse oximetry  -Nohemy blairn      Hypertension  Chronic, uncontrolled. Latest blood pressure and vitals reviewed-     Temp:  [98.1 °F (36.7 °C)-98.8 °F (37.1 °C)]   Pulse:  [41-58]   Resp:  [17-18]   BP: (115-198)/(53-74)   SpO2:  [94 %-97 %] .   Home meds for hypertension were reviewed and noted below.   Hypertension Medications               hydroCHLOROthiazide (MICROZIDE) 12.5 mg capsule Take 1 capsule by mouth every morning.     While in the hospital, will manage blood pressure as follows; Continue home antihypertensive regimen    Will utilize p.r.n. blood pressure medication only if patient's blood pressure greater than 160/100 and she develops symptoms such as worsening chest pain or shortness of breath.      Shortness of breath  - IV lasix  - supplemental oxygen  -CXR: Diminishing vascular congestion  -CTA: Cardiomegaly. Mild moderate pulmonary edema. Mild left-sided pleural effusion. Small right-sided pleural effusion. Correlate clinically to CHF. No evidence for pulmonary emboli. Atypical infectious process not excluded.       VTE Risk Mitigation (From admission, onward)           Ordered     enoxaparin injection 40 mg  Daily         07/13/24 2334     IP VTE HIGH RISK PATIENT  Once         07/13/24 2334     Place sequential compression device  Until discontinued         07/13/24 2334                    Discharge Planning   DUTCH:      Code Status: Full Code   Is the patient medically ready for discharge?:     Reason for patient still in hospital (select all that apply): Patient trending condition,  Laboratory test, Treatment, and Consult recommendations  Discharge Plan A: Home          The patient's case has been reviewed by my supervising physician.   Supervising physician will provide additional orders and recommendations at his/her discretion.  Please see supervising physicians documentation and/or orders for further details.             Sammi Mark NP  Department of Hospital Medicine   [facility]

## 2024-07-16 NOTE — ASSESSMENT & PLAN NOTE
- IV lasix  - supplemental oxygen  -CXR: Diminishing vascular congestion  -CTA: Cardiomegaly. Mild moderate pulmonary edema. Mild left-sided pleural effusion. Small right-sided pleural effusion. Correlate clinically to CHF. No evidence for pulmonary emboli. Atypical infectious process not excluded.

## 2024-07-16 NOTE — ASSESSMENT & PLAN NOTE
Patient with Hypoxic Respiratory failure which is Acute.  she is not on home oxygen. Supplemental oxygen was provided and noted-      Signs/symptoms of respiratory failure include- tachypnea, increased work of breathing, and respiratory distress. Contributing diagnoses includes - CHF, Pleural effusion, Pneumonia, and Pulmonary Embolus Labs and images were reviewed. Patient Has not had a recent ABG. Will treat underlying causes and adjust management of respiratory failure as follows:  Plan:  -Continue treatment and workup of CHF  -Titrate oxygen requirements as needed  -Incentive spirometry   -Monitor pulse oximetry  -Nohemy blairn

## 2024-07-16 NOTE — ASSESSMENT & PLAN NOTE
Chronic, uncontrolled. Latest blood pressure and vitals reviewed-     Temp:  [98.1 °F (36.7 °C)-98.8 °F (37.1 °C)]   Pulse:  [41-58]   Resp:  [17-18]   BP: (115-198)/(53-74)   SpO2:  [94 %-97 %] .   Home meds for hypertension were reviewed and noted below.   Hypertension Medications               hydroCHLOROthiazide (MICROZIDE) 12.5 mg capsule Take 1 capsule by mouth every morning.     While in the hospital, will manage blood pressure as follows; Continue home antihypertensive regimen    Will utilize p.r.n. blood pressure medication only if patient's blood pressure greater than 160/100 and she develops symptoms such as worsening chest pain or shortness of breath.

## 2024-07-16 NOTE — SUBJECTIVE & OBJECTIVE
Review of Systems   Constitutional: Positive for malaise/fatigue.   HENT: Negative.     Eyes: Negative.    Cardiovascular: Negative.    Respiratory:  Positive for shortness of breath.    Skin: Negative.    Musculoskeletal:  Positive for arthritis, back pain, muscle cramps and myalgias.   Gastrointestinal: Negative.    Genitourinary: Negative.    Neurological:  Positive for weakness.   Psychiatric/Behavioral: Negative.       Objective:     Vital Signs (Most Recent):  Temp: 98.7 °F (37.1 °C) (07/16/24 1122)  Pulse: (!) 51 (07/16/24 1400)  Resp: 18 (07/16/24 1400)  BP: (!) 155/60 (07/16/24 1122)  SpO2: (!) 91 % (07/16/24 1400) Vital Signs (24h Range):  Temp:  [98.1 °F (36.7 °C)-98.8 °F (37.1 °C)] 98.7 °F (37.1 °C)  Pulse:  [41-58] 51  Resp:  [17-18] 18  SpO2:  [91 %-97 %] 91 %  BP: (115-198)/(53-74) 155/60     Weight: 66.5 kg (146 lb 9.7 oz)  Body mass index is 21.04 kg/m².     SpO2: (!) 91 %       No intake or output data in the 24 hours ending 07/16/24 1442    Lines/Drains/Airways       Peripheral Intravenous Line  Duration                  Peripheral IV - Single Lumen 07/13/24 1800 18 G 1 1/4 in Anterior;Proximal;Right Forearm 2 days         Peripheral IV - Single Lumen 07/13/24 1805 20 G 1 1/4 in Left Antecubital 2 days                       Physical Exam  Vitals and nursing note reviewed.   Constitutional:       Appearance: Normal appearance. She is ill-appearing.   HENT:      Head: Normocephalic.   Eyes:      Pupils: Pupils are equal, round, and reactive to light.   Cardiovascular:      Rate and Rhythm: Normal rate and regular rhythm.      Heart sounds: Normal heart sounds, S1 normal and S2 normal. No murmur heard.     No S3 or S4 sounds.   Pulmonary:      Effort: Pulmonary effort is normal.      Breath sounds: Rales present.   Abdominal:      General: Bowel sounds are normal.      Palpations: Abdomen is soft.   Musculoskeletal:         General: Normal range of motion.      Cervical back: Normal range of  "motion.   Skin:     Capillary Refill: Capillary refill takes less than 2 seconds.   Neurological:      General: No focal deficit present.      Mental Status: She is alert and oriented to person, place, and time.   Psychiatric:         Mood and Affect: Mood is anxious. Affect is tearful.         Behavior: Behavior normal.         Thought Content: Thought content normal.            Significant Labs: BMP:   Recent Labs   Lab 07/15/24  0818 07/16/24  0547    95    142   K 3.2* 3.8    109   CO2 23 24   BUN 31* 34*   CREATININE 1.0 1.2   CALCIUM 8.4* 8.5*   MG 1.9 2.1   , CMP   Recent Labs   Lab 07/15/24  0818 07/16/24  0547    142   K 3.2* 3.8    109   CO2 23 24    95   BUN 31* 34*   CREATININE 1.0 1.2   CALCIUM 8.4* 8.5*   ANIONGAP 11 9   , CBC   Recent Labs   Lab 07/15/24  0818 07/16/24  0547   WBC 7.08 5.76   HGB 11.6* 10.9*   HCT 34.8* 33.0*   * 153   , INR No results for input(s): "INR", "PROTIME" in the last 48 hours., Lipid Panel No results for input(s): "CHOL", "HDL", "LDLCALC", "TRIG", "CHOLHDL" in the last 48 hours., Troponin   Recent Labs   Lab 07/14/24  1542 07/15/24  0818   TROPONINI 0.309* 0.203*   , and All pertinent lab results from the last 24 hours have been reviewed.    Significant Imaging: Cardiac Cath: reviewed, Echocardiogram: Transthoracic echo (TTE) complete (Cupid Only):   Results for orders placed or performed during the hospital encounter of 07/13/24   Echo   Result Value Ref Range    BSA 1.86 m2    LVIDd 4.20 3.5 - 6.0 cm    LV Systolic Volume 47.52 mL    LV Systolic Volume Index 25.4 mL/m2    LVIDs 3.40 2.1 - 4.0 cm    LV Diastolic Volume 78.48 mL    LV Diastolic Volume Index 41.97 mL/m2    Left Ventricular End Systolic Volume by Teichholz Method 47.52 mL    Left Ventricular End Diastolic Volume by Teichholz Method 78.48 mL    IVS 1.53 (A) 0.6 - 1.1 cm    LVOT diameter 2.07 cm    LVOT area 3.4 cm2    FS 19 (A) 28 - 44 %    Left Ventricle Relative " Wall Thickness 0.70 cm    Posterior Wall 1.47 (A) 0.6 - 1.1 cm    LV mass 249.50 g    LV Mass Index 133 g/m2    MV Peak E Bobby 1.00 m/s    TDI LATERAL 0.09 m/s    TDI SEPTAL 0.11 m/s    E/E' ratio 10.00 m/s    MV Peak A Bobby 0.83 m/s    TR Max Bobby 3.24 m/s    E/A ratio 1.20     IVRT 87.54 msec    E wave deceleration time 256.49 msec    LV SEPTAL E/E' RATIO 9.09 m/s    LV LATERAL E/E' RATIO 11.11 m/s    LA size 4.89 cm    Left Atrium Minor Axis 6.92 cm    Left Atrium Major Axis 7.90 cm    RA Major Axis 6.58 cm    AV regurgitation pressure 1/2 time 484.197663826070808 ms    AR Max Bobby 4.16 m/s    AV mean gradient 11 mmHg    AV peak gradient 20 mmHg    Ao peak bobby 2.25 m/s    Ao VTI 59.70 cm    MV stenosis pressure 1/2 time 74.38 ms    MV valve area p 1/2 method 2.96 cm2    Triscuspid Valve Regurgitation Peak Gradient 42 mmHg    PV PEAK VELOCITY 1.41 m/s    PV peak gradient 8 mmHg    Pulmonary Valve Mean Velocity 0.91 m/s    STJ 3.00 cm    IVC diameter 1.08 cm    Mean e' 0.10 m/s    ZLVIDS 0.44     ZLVIDD -2.16     EF 55 %    TV resting pulmonary artery pressure 45 mmHg    RV TB RVSP 6 mmHg    Est. RA pres 3 mmHg    Narrative      Left Ventricle: The left ventricle is normal in size. Normal wall   thickness. There is concentric hypertrophy. There is normal systolic   function. Ejection fraction by visual approximation is 55%.    Right Ventricle: Normal right ventricular cavity size. Wall thickness   is normal. Systolic function is normal.    Aortic Valve: There is mild aortic regurgitation with a centrally   directed jet.    Pulmonary Artery: The estimated pulmonary artery systolic pressure is   45 mmHg.    IVC/SVC: Normal venous pressure at 3 mmHg.     , EKG: reviewed, Stress Test: reviewed, and X-Ray: CXR: X-Ray Chest 1 View (CXR):   Results for orders placed or performed during the hospital encounter of 07/13/24   X-Ray Chest 1 View    Narrative    EXAMINATION:  XR CHEST 1 VIEW    CLINICAL HISTORY:  shortness of  breath;    TECHNIQUE:  Single frontal portable view of the chest was performed.    COMPARISON:  Yesterday    FINDINGS:  Mediastinum rotated.  Pulmonary interstitial markings diminishing.  Minimal blunting of the costophrenic angles redemonstrated similar.      Impression    Diminishing vascular congestion      Electronically signed by: Sherie Carty  Date:    07/14/2024  Time:    17:30

## 2024-07-16 NOTE — ASSESSMENT & PLAN NOTE
BNP 2367, received IV lasix x4. On nasal cannula  GDMT ARB, not on BB given bradycardia  Kidney function stable  Repeat BNP pending  Echo yesterday EF 55%  Nuc stress tomorrow  F/u in [facility]    7/16/24  Hold off on nuc stress today, diurese  BNP elevated  IV diuresis cont ARB  Strict I/Os monitor renal function

## 2024-07-16 NOTE — ASSESSMENT & PLAN NOTE
Improving since admission  BNP pending  Wean O2 as tolerated    7/16/24  BNP still elevated  tayee

## 2024-07-16 NOTE — CARE UPDATE
Home Oxygen Evaluation - [facility] - Cardiopulmonary Department      Date Performed: 7/16/2024      1) Patient's Home O2 Sat on room air, while at rest: Room Air SpO2 At Rest: (P) 91 %        If O2 sats on room air at rest are 88% or below, patient qualifies.  Document O2 liter flow needed in Step 2.  If O2 sats are 89% or above, complete Step 3.        2)  If patient is not ambulated and O2 sats are <88%, what is the O2 liter flow required to meet ordered saturation?      If O2 sats on room air while exercising remain 89% or above patient does not qualify, no further testing needed Document N/A in step 3. If O2 sats on room air while exercising are 88% or below, continue to Step 4.    3) Patient's O2 Sat on room air while exercising: Room Air SpO2 During Ambulation: (P) 94 %        4) Patient's O2 Sat while exercising on O2:   at           (Must show improvement from #4 for patients to qualify)

## 2024-07-16 NOTE — SUBJECTIVE & OBJECTIVE
Interval History: f/u SOB. Patient awake and alert. NAD. Patient reports mild SOB, continues to require 3L NC. Patient denies any nausea or vomiting. Reports Right sided CP.Nuclear stress test cancelled per cardiology. Started on IV lasix 40mg q12h per cardiology as BNP has continued to increase. Pt/ot to eval and treat. Home O2 eval.     Review of Systems  Objective:     Vital Signs (Most Recent):  Temp: 98.7 °F (37.1 °C) (07/16/24 1122)  Pulse: (!) 47 (07/16/24 1122)  Resp: 18 (07/16/24 1122)  BP: (!) 155/60 (07/16/24 1122)  SpO2: 97 % (07/16/24 1122) Vital Signs (24h Range):  Temp:  [98.1 °F (36.7 °C)-98.8 °F (37.1 °C)] 98.7 °F (37.1 °C)  Pulse:  [41-58] 47  Resp:  [17-18] 18  SpO2:  [94 %-97 %] 97 %  BP: (115-198)/(53-74) 155/60     Weight: 66.5 kg (146 lb 9.7 oz)  Body mass index is 21.04 kg/m².  No intake or output data in the 24 hours ending 07/16/24 1352      Physical Exam  Vitals and nursing note reviewed.   Constitutional:       General: She is not in acute distress.     Appearance: She is ill-appearing (chronically).   HENT:      Mouth/Throat:      Mouth: Mucous membranes are moist.      Pharynx: Oropharynx is clear.   Eyes:      Pupils: Pupils are equal, round, and reactive to light.   Cardiovascular:      Rate and Rhythm: Regular rhythm. Bradycardia present.      Heart sounds: No murmur heard.  Pulmonary:      Effort: Pulmonary effort is normal.      Breath sounds: No wheezing.      Comments: Bilateral lung base crackles   Abdominal:      General: Bowel sounds are normal. There is no distension.      Palpations: Abdomen is soft.      Tenderness: There is no abdominal tenderness.   Musculoskeletal:         General: Normal range of motion.      Right lower leg: Edema (+1) present.      Left lower leg: Edema (+1) present.   Skin:     General: Skin is warm and dry.      Comments: BLE chronic venous statis     Neurological:      General: No focal deficit present.      Mental Status: She is alert and oriented  to person, place, and time.             Significant Labs: All pertinent labs within the past 24 hours have been reviewed.  Recent Lab Results         07/16/24  0547   07/15/24  1437        Anion Gap 9         Baso # 0.02         Basophil % 0.3         BNP   2,727  Comment: Values of less than 100 pg/ml are consistent with non-CHF populations.       BUN 34         Calcium 8.5         Chloride 109         CO2 24         Creatinine 1.2         Differential Method Automated         eGFR 48         Eos # 0.1         Eos % 1.2         Glucose 95         Gran # (ANC) 3.9         Gran % 68.1         Hematocrit 33.0         Hemoglobin 10.9         Immature Grans (Abs) 0.01  Comment: Mild elevation in immature granulocytes is non specific and   can be seen in a variety of conditions including stress response,   acute inflammation, trauma and pregnancy. Correlation with other   laboratory and clinical findings is essential.           Immature Granulocytes 0.2         Lymph # 1.1         Lymph % 18.9         Magnesium  2.1         MCH 32.1         MCHC 33.0         MCV 97         Mono # 0.7         Mono % 11.3         MPV 11.6         nRBC 0         Phosphorus Level 4.5         Platelet Count 153         Potassium 3.8         RBC 3.40         RDW 13.2         Sodium 142         WBC 5.76                 Significant Imaging: I have reviewed all pertinent imaging results/findings within the past 24 hours.

## 2024-07-16 NOTE — PROGRESS NOTES
[facility]  Cardiology  Progress Note    Patient Name: Kaleigh Delgado  MRN: [id number]  Admission Date: 7/13/2024  Hospital Length of Stay: 3 days  Code Status: Full Code   Attending Physician: Alina Steele MD   Primary Care Physician: Aldair Kaur MD  Expected Discharge Date:   Principal Problem:CHF (congestive heart failure)    Subjective:   Patient is a 74 year old female with a past medical history of hypertension, history of CHF, followed in the past by [facility], apparent history of afib, but now in sinus rhythm. BNP greater than 2000. Chest x-ray shows CHF. EKG shows sinus bradycardia with PAC's. There is a mild increase in troponin 0.09. Patient states history of heart cath by [facility] in 2021 that was treated medically.    Recommend continue Iv diuresis, check echocardiogram, and blood pressure control. Patient will eventually need nuclear stress test. We will need to obtain cath report from [facility].      History reviewed. No pertinent past medical history.  Hospital Course:   7/15/24 pt seen and examined today, resting in bed. On supplemental O2 via NC. Denies any CP at this time. C/o aches and fatigue, tele reviewed HR 40s on monitor. Labs reviewed, -2.3L for admission, troponin 0.309, Crt 1.0. Received 4 doses of lasix since admission not on any scheduled currently. Previously followed by [facility] had LHC done in 21', she is unsure of results. will get records. Nuc stress planned for tomorrow    7/16/24 Pt seen and examined today, does not feel well. Has lots of outside stressors,  is sick. Planned on nuc stress today EKG with intermittent 2:1 AVB, pt feeling anxious will reschedule once diuresed. BNP yesterday 2727, needs diuresis.         Review of Systems   Constitutional: Positive for malaise/fatigue.   HENT: Negative.     Eyes: Negative.    Cardiovascular: Negative.    Respiratory:  Positive for shortness of breath.    Skin: Negative.    Musculoskeletal:  Positive for arthritis, back pain,  muscle cramps and myalgias.   Gastrointestinal: Negative.    Genitourinary: Negative.    Neurological:  Positive for weakness.   Psychiatric/Behavioral: Negative.       Objective:     Vital Signs (Most Recent):  Temp: 98.7 °F (37.1 °C) (07/16/24 1122)  Pulse: (!) 51 (07/16/24 1400)  Resp: 18 (07/16/24 1400)  BP: (!) 155/60 (07/16/24 1122)  SpO2: (!) 91 % (07/16/24 1400) Vital Signs (24h Range):  Temp:  [98.1 °F (36.7 °C)-98.8 °F (37.1 °C)] 98.7 °F (37.1 °C)  Pulse:  [41-58] 51  Resp:  [17-18] 18  SpO2:  [91 %-97 %] 91 %  BP: (115-198)/(53-74) 155/60     Weight: 66.5 kg (146 lb 9.7 oz)  Body mass index is 21.04 kg/m².     SpO2: (!) 91 %       No intake or output data in the 24 hours ending 07/16/24 1442    Lines/Drains/Airways       Peripheral Intravenous Line  Duration                  Peripheral IV - Single Lumen 07/13/24 1800 18 G 1 1/4 in Anterior;Proximal;Right Forearm 2 days         Peripheral IV - Single Lumen 07/13/24 1805 20 G 1 1/4 in Left Antecubital 2 days                       Physical Exam  Vitals and nursing note reviewed.   Constitutional:       Appearance: Normal appearance. She is ill-appearing.   HENT:      Head: Normocephalic.   Eyes:      Pupils: Pupils are equal, round, and reactive to light.   Cardiovascular:      Rate and Rhythm: Normal rate and regular rhythm.      Heart sounds: Normal heart sounds, S1 normal and S2 normal. No murmur heard.     No S3 or S4 sounds.   Pulmonary:      Effort: Pulmonary effort is normal.      Breath sounds: Rales present.   Abdominal:      General: Bowel sounds are normal.      Palpations: Abdomen is soft.   Musculoskeletal:         General: Normal range of motion.      Cervical back: Normal range of motion.   Skin:     Capillary Refill: Capillary refill takes less than 2 seconds.   Neurological:      General: No focal deficit present.      Mental Status: She is alert and oriented to person, place, and time.   Psychiatric:         Mood and Affect: Mood is  "anxious. Affect is tearful.         Behavior: Behavior normal.         Thought Content: Thought content normal.            Significant Labs: BMP:   Recent Labs   Lab 07/15/24  0818 07/16/24  0547    95    142   K 3.2* 3.8    109   CO2 23 24   BUN 31* 34*   CREATININE 1.0 1.2   CALCIUM 8.4* 8.5*   MG 1.9 2.1   , CMP   Recent Labs   Lab 07/15/24  0818 07/16/24  0547    142   K 3.2* 3.8    109   CO2 23 24    95   BUN 31* 34*   CREATININE 1.0 1.2   CALCIUM 8.4* 8.5*   ANIONGAP 11 9   , CBC   Recent Labs   Lab 07/15/24  0818 07/16/24  0547   WBC 7.08 5.76   HGB 11.6* 10.9*   HCT 34.8* 33.0*   * 153   , INR No results for input(s): "INR", "PROTIME" in the last 48 hours., Lipid Panel No results for input(s): "CHOL", "HDL", "LDLCALC", "TRIG", "CHOLHDL" in the last 48 hours., Troponin   Recent Labs   Lab 07/14/24  1542 07/15/24  0818   TROPONINI 0.309* 0.203*   , and All pertinent lab results from the last 24 hours have been reviewed.    Significant Imaging: Cardiac Cath: reviewed, Echocardiogram: Transthoracic echo (TTE) complete (Cupid Only):   Results for orders placed or performed during the hospital encounter of 07/13/24   Echo   Result Value Ref Range    BSA 1.86 m2    LVIDd 4.20 3.5 - 6.0 cm    LV Systolic Volume 47.52 mL    LV Systolic Volume Index 25.4 mL/m2    LVIDs 3.40 2.1 - 4.0 cm    LV Diastolic Volume 78.48 mL    LV Diastolic Volume Index 41.97 mL/m2    Left Ventricular End Systolic Volume by Teichholz Method 47.52 mL    Left Ventricular End Diastolic Volume by Teichholz Method 78.48 mL    IVS 1.53 (A) 0.6 - 1.1 cm    LVOT diameter 2.07 cm    LVOT area 3.4 cm2    FS 19 (A) 28 - 44 %    Left Ventricle Relative Wall Thickness 0.70 cm    Posterior Wall 1.47 (A) 0.6 - 1.1 cm    LV mass 249.50 g    LV Mass Index 133 g/m2    MV Peak E Bobby 1.00 m/s    TDI LATERAL 0.09 m/s    TDI SEPTAL 0.11 m/s    E/E' ratio 10.00 m/s    MV Peak A Bobby 0.83 m/s    TR Max Bobby 3.24 m/s    E/A " ratio 1.20     IVRT 87.54 msec    E wave deceleration time 256.49 msec    LV SEPTAL E/E' RATIO 9.09 m/s    LV LATERAL E/E' RATIO 11.11 m/s    LA size 4.89 cm    Left Atrium Minor Axis 6.92 cm    Left Atrium Major Axis 7.90 cm    RA Major Axis 6.58 cm    AV regurgitation pressure 1/2 time 484.820055641286325 ms    AR Max Bobby 4.16 m/s    AV mean gradient 11 mmHg    AV peak gradient 20 mmHg    Ao peak bobby 2.25 m/s    Ao VTI 59.70 cm    MV stenosis pressure 1/2 time 74.38 ms    MV valve area p 1/2 method 2.96 cm2    Triscuspid Valve Regurgitation Peak Gradient 42 mmHg    PV PEAK VELOCITY 1.41 m/s    PV peak gradient 8 mmHg    Pulmonary Valve Mean Velocity 0.91 m/s    STJ 3.00 cm    IVC diameter 1.08 cm    Mean e' 0.10 m/s    ZLVIDS 0.44     ZLVIDD -2.16     EF 55 %    TV resting pulmonary artery pressure 45 mmHg    RV TB RVSP 6 mmHg    Est. RA pres 3 mmHg    Narrative      Left Ventricle: The left ventricle is normal in size. Normal wall   thickness. There is concentric hypertrophy. There is normal systolic   function. Ejection fraction by visual approximation is 55%.    Right Ventricle: Normal right ventricular cavity size. Wall thickness   is normal. Systolic function is normal.    Aortic Valve: There is mild aortic regurgitation with a centrally   directed jet.    Pulmonary Artery: The estimated pulmonary artery systolic pressure is   45 mmHg.    IVC/SVC: Normal venous pressure at 3 mmHg.     , EKG: reviewed, Stress Test: reviewed, and X-Ray: CXR: X-Ray Chest 1 View (CXR):   Results for orders placed or performed during the hospital encounter of 07/13/24   X-Ray Chest 1 View    Narrative    EXAMINATION:  XR CHEST 1 VIEW    CLINICAL HISTORY:  shortness of breath;    TECHNIQUE:  Single frontal portable view of the chest was performed.    COMPARISON:  Yesterday    FINDINGS:  Mediastinum rotated.  Pulmonary interstitial markings diminishing.  Minimal blunting of the costophrenic angles redemonstrated similar.       Impression    Diminishing vascular congestion      Electronically signed by: Sherie Carty  Date:    07/14/2024  Time:    17:30     Assessment and Plan:       * CHF (congestive heart failure)  BNP 2367, received IV lasix x4. On nasal cannula  GDMT ARB, not on BB given bradycardia  Kidney function stable  Repeat BNP pending  Echo yesterday EF 55%  [facility] stress tomorrow  [facility]    [facility]  Hold off on nuc stress today, diurese  BNP elevated  IV diuresis cont ARB  Strict I/Os monitor renal function      Bradycardia  HR 40s with PVCs, asymptomatic  Avoid AV giacomo blocking agents  F/u with primary cardiologist to monitor  Home med list with only HCTZ    7/16/24  EKG during stress today with int 2:1 AVB      Acute hypoxemic respiratory failure  Management per primary team    Hypertension  Titrate medications, cont ARB    Shortness of breath  Improving since admission  BNP pending  Wean O2 as tolerated    7/16/24  BNP still elevated  diurese        VTE Risk Mitigation (From admission, onward)           Ordered     enoxaparin injection 40 mg  Daily         07/13/24 2334     IP VTE HIGH RISK PATIENT  Once         07/13/24 2334     Place sequential compression device  Until discontinued         07/13/24 2334                    Yaa Salas, NP  Cardiology  [facility]

## 2024-07-16 NOTE — ASSESSMENT & PLAN NOTE
Patient is identified as having  evidence of new onset  heart failure that is Acute. CHF is currently uncontrolled due to Continued edema of extremities, Dyspnea not returned to baseline after single doses of IV diuretic, >3 pillow orthopnea, Rales/crackles on pulmonary exam, and Pulmonary edema/pleural effusion on CXR. Latest ECHO performed and demonstrates 55% EF  Continue Furosemide and monitor clinical status closely. Monitor on telemetry. Patient is on CHF pathway.  Monitor strict Is&Os and daily weights.  Place on fluid restriction of 1.5 L. Cardiology has been consulted. Continue to stress to patient importance of self efficacy and  on diet for CHF. Last BNP reviewed- and noted below   Recent Labs   Lab 07/15/24  1437   BNP 2,727*     -Cardiology consulted   -ECHO: Ejection fraction 55%   -Planned Nuc stress test cancelled per cardiology   -IV lasix 40mg q12h   -Wean O2 as tolerated   -pt/ot to eval and treat

## 2024-07-16 NOTE — ASSESSMENT & PLAN NOTE
HR 40s with PVCs, asymptomatic  Avoid AV giacomo blocking agents  F/u with primary cardiologist to monitor  Home med list with only HCTZ    7/16/24  EKG during stress today with int 2:1 AVB

## 2024-07-17 PROBLEM — R79.89 ELEVATED TROPONIN: Status: ACTIVE | Noted: 2024-07-17

## 2024-07-17 LAB
ANION GAP SERPL CALC-SCNC: 14 MMOL/L (ref 8–16)
BUN SERPL-MCNC: 33 MG/DL (ref 8–23)
CALCIUM SERPL-MCNC: 9.2 MG/DL (ref 8.7–10.5)
CHLORIDE SERPL-SCNC: 108 MMOL/L (ref 95–110)
CO2 SERPL-SCNC: 21 MMOL/L (ref 23–29)
CREAT SERPL-MCNC: 1 MG/DL (ref 0.5–1.4)
EST. GFR  (NO RACE VARIABLE): 59 ML/MIN/1.73 M^2
GLUCOSE SERPL-MCNC: 105 MG/DL (ref 70–110)
MAGNESIUM SERPL-MCNC: 2 MG/DL (ref 1.6–2.6)
POTASSIUM SERPL-SCNC: 4 MMOL/L (ref 3.5–5.1)
SODIUM SERPL-SCNC: 143 MMOL/L (ref 136–145)

## 2024-07-17 PROCEDURE — 93010 ELECTROCARDIOGRAM REPORT: CPT | Mod: ,,, | Performed by: INTERNAL MEDICINE

## 2024-07-17 PROCEDURE — 97162 PT EVAL MOD COMPLEX 30 MIN: CPT

## 2024-07-17 PROCEDURE — 63600175 PHARM REV CODE 636 W HCPCS

## 2024-07-17 PROCEDURE — 93005 ELECTROCARDIOGRAM TRACING: CPT

## 2024-07-17 PROCEDURE — 83735 ASSAY OF MAGNESIUM: CPT | Performed by: INTERNAL MEDICINE

## 2024-07-17 PROCEDURE — 25000003 PHARM REV CODE 250

## 2024-07-17 PROCEDURE — 80048 BASIC METABOLIC PNL TOTAL CA: CPT | Performed by: HOSPITALIST

## 2024-07-17 PROCEDURE — 99900035 HC TECH TIME PER 15 MIN (STAT)

## 2024-07-17 PROCEDURE — 97166 OT EVAL MOD COMPLEX 45 MIN: CPT

## 2024-07-17 PROCEDURE — 99232 SBSQ HOSP IP/OBS MODERATE 35: CPT | Mod: 25,,,

## 2024-07-17 PROCEDURE — 36415 COLL VENOUS BLD VENIPUNCTURE: CPT | Performed by: HOSPITALIST

## 2024-07-17 PROCEDURE — 25000003 PHARM REV CODE 250: Performed by: HOSPITALIST

## 2024-07-17 PROCEDURE — 97530 THERAPEUTIC ACTIVITIES: CPT

## 2024-07-17 PROCEDURE — 99223 1ST HOSP IP/OBS HIGH 75: CPT | Mod: ,,, | Performed by: INTERNAL MEDICINE

## 2024-07-17 PROCEDURE — 11000001 HC ACUTE MED/SURG PRIVATE ROOM

## 2024-07-17 PROCEDURE — 99223 1ST HOSP IP/OBS HIGH 75: CPT | Mod: ,,, | Performed by: NURSE PRACTITIONER

## 2024-07-17 PROCEDURE — 63600175 PHARM REV CODE 636 W HCPCS: Performed by: HOSPITALIST

## 2024-07-17 PROCEDURE — 25000003 PHARM REV CODE 250: Performed by: INTERNAL MEDICINE

## 2024-07-17 RX ORDER — AMLODIPINE BESYLATE 5 MG/1
5 TABLET ORAL DAILY
Status: DISCONTINUED | OUTPATIENT
Start: 2024-07-17 | End: 2024-07-18

## 2024-07-17 RX ORDER — HYDRALAZINE HYDROCHLORIDE 10 MG/1
10 TABLET, FILM COATED ORAL EVERY 8 HOURS
Status: DISCONTINUED | OUTPATIENT
Start: 2024-07-17 | End: 2024-07-19

## 2024-07-17 RX ADMIN — SENNOSIDES AND DOCUSATE SODIUM 1 TABLET: 50; 8.6 TABLET ORAL at 08:07

## 2024-07-17 RX ADMIN — HYDRALAZINE HYDROCHLORIDE 10 MG: 10 TABLET, FILM COATED ORAL at 07:07

## 2024-07-17 RX ADMIN — POLYETHYLENE GLYCOL 3350 17 G: 17 POWDER, FOR SOLUTION ORAL at 08:07

## 2024-07-17 RX ADMIN — LOSARTAN POTASSIUM 50 MG: 50 TABLET, FILM COATED ORAL at 08:07

## 2024-07-17 RX ADMIN — POTASSIUM CHLORIDE 40 MEQ: 1500 TABLET, EXTENDED RELEASE ORAL at 08:07

## 2024-07-17 RX ADMIN — ENOXAPARIN SODIUM 40 MG: 40 INJECTION SUBCUTANEOUS at 04:07

## 2024-07-17 RX ADMIN — FUROSEMIDE 40 MG: 10 INJECTION, SOLUTION INTRAMUSCULAR; INTRAVENOUS at 08:07

## 2024-07-17 RX ADMIN — HYDRALAZINE HYDROCHLORIDE 10 MG: 10 TABLET, FILM COATED ORAL at 02:07

## 2024-07-17 RX ADMIN — HYDRALAZINE HYDROCHLORIDE 10 MG: 10 TABLET, FILM COATED ORAL at 09:07

## 2024-07-17 RX ADMIN — AMLODIPINE BESYLATE 5 MG: 5 TABLET ORAL at 10:07

## 2024-07-17 RX ADMIN — HYDRALAZINE HYDROCHLORIDE 10 MG: 20 INJECTION, SOLUTION INTRAMUSCULAR; INTRAVENOUS at 12:07

## 2024-07-17 NOTE — ASSESSMENT & PLAN NOTE
- IV lasix  - supplemental oxygen as needed   -CXR: Diminishing vascular congestion  -CTA: Cardiomegaly. Mild moderate pulmonary edema. Mild left-sided pleural effusion. Small right-sided pleural effusion. Correlate clinically to CHF. No evidence for pulmonary emboli. Atypical infectious process not excluded.

## 2024-07-17 NOTE — CONSULTS
Subjective:   Patient ID:  Kaleigh Delgado is a 74 y.o. female who presents for evaluation of Shortness of Breath (Pt with a hx of pulmonary edema and unspecified cardiac hx arrived in the ED via [facility] on 15L O2 via NRB with c/o SOB and BLE edema x last couple days. Pt reports swelling has worsened today. +4 pitting edema noted to BLE. Pt denies home O2, CHF, blood thinners, or diuretics. [facility] medic reports FD was the first on scene and pt's O2 saturation was in the 80s on RA. )        Asked to see patient for bradycardia by Dr rodriguez  I reviewed the chart in detail including all relevant clinical notes, cardiac study reports, lab data and Xrays.  I have reviewed the actual images of all relevant ECG, rhythm, holter and long term monitoring tracings obtained during current hospitalization and in past records.   I have reviewed the actual images of all relevant CXRays.     I have discussed this patient's case in detail with ms Dubon and I agree with the her summary as listed. I have also made edits where appropriate.   The following applies:  Pat has high grade but seemingl;y currently asymptomatic infranodal block.  There is no history of the syncope.  Conduction is varying between 2 to 1 and 3 to 1.  There is variable distal aberration (bundle branch block alternating with left bundle-branch block on various instances. ).  Patient definitely needs a CRT P implant.  However she is still in significant the fluid overload and needs to be diuresed appropriately before her being submitted to sedation for an elective surgery.          Kaleigh Delgado is a 74 year old female who [facility] on [facility]14[facility]24 due to shortness of breath x 2 days with associated bilateral leg swelling. She was found to be hypoxic and initiated on supplemental oxygen at 6L NC but was up titrated to 15L NRB mask in ED.     Initial ED workup revealed D Dimer 1.30, BNP 2367, CTA positive for mild to moderate Pulmonary edema. She was admitted under  the care of hospital medicine.     She has been receiving IV diuresis since admission, repeat BNP 2727     EP consult today given intermittent 2:1 AVB and bradycardia since admission.     Patient seen and examined today, remains short of breath with exertional activities. Has weaned off oxygen support.       Results for orders placed during the hospital encounter of 07/13/24    Echo    Interpretation Summary    Left Ventricle: The left ventricle is normal in size. Normal wall thickness. There is concentric hypertrophy. There is normal systolic function. Ejection fraction by visual approximation is 55%.    Right Ventricle: Normal right ventricular cavity size. Wall thickness is normal. Systolic function is normal.    Aortic Valve: There is mild aortic regurgitation with a centrally directed jet.    Pulmonary Artery: The estimated pulmonary artery systolic pressure is 45 mmHg.    IVC/SVC: Normal venous pressure at 3 mmHg.      History reviewed. No pertinent past medical history.    Past Surgical History:   Procedure Laterality Date    exploratory lap      FIRST RIB REMOVAL      KNEE SURGERY      TONSILLECTOMY      VARICOSE VEIN SURGERY         Social History     Tobacco Use    Smoking status: Never   Substance Use Topics    Alcohol use: No    Drug use: No       Family History   Family history unknown: Yes       Wt Readings from Last 3 Encounters:   07/15/24 66.5 kg (146 lb 9.7 oz)   07/22/13 63.5 kg (140 lb)   05/07/12 69.1 kg (152 lb 5.1 oz)     Temp Readings from Last 3 Encounters:   07/17/24 98.6 °F (37 °C) (Oral)   01/23/24 98.2 °F (36.8 °C) (Oral)   07/22/13 98.6 °F (37 °C) (Oral)     BP Readings from Last 3 Encounters:   07/17/24 (!) 200/79   01/23/24 (!) 157/61   07/22/13 (!) 169/94     Pulse Readings from Last 3 Encounters:   07/17/24 (!) 44   01/23/24 101   07/22/13 80       No current facility-administered medications on file prior to encounter.     Current Outpatient Medications on File Prior to Encounter    Medication Sig Dispense Refill    hydroCHLOROthiazide (MICROZIDE) 12.5 mg capsule Take 1 capsule by mouth every morning.         No cardiac monitor results found for the past 12 months    Results for orders placed during the hospital encounter of 07/13/24    Echo    Interpretation Summary    Left Ventricle: The left ventricle is normal in size. Normal wall thickness. There is concentric hypertrophy. There is normal systolic function. Ejection fraction by visual approximation is 55%.    Right Ventricle: Normal right ventricular cavity size. Wall thickness is normal. Systolic function is normal.    Aortic Valve: There is mild aortic regurgitation with a centrally directed jet.    Pulmonary Artery: The estimated pulmonary artery systolic pressure is 45 mmHg.    IVC/SVC: Normal venous pressure at 3 mmHg.    No results found for this or any previous visit.        Results for orders placed or performed during the hospital encounter of 07/13/24   EKG 12-lead    Collection Time: 07/17/24  9:38 AM   Result Value Ref Range    QRS Duration 136 ms    [facility] QTC Calculation 504 ms    Narrative    Test Reason : R00.1,    Vent. Rate : 049 BPM     Atrial Rate : 049 BPM     P-R Int : 000 ms          QRS Dur : 136 ms      QT Int : 558 ms       P-R-T Axes : 020 -64 041 degrees     QTc Int : 504 ms    Undetermined rhythm  Right bundle branch block  Left anterior fascicular block   Bifascicular block   LVH with repolarization abnormality ( R in aVL )  Cannot rule out Septal infarct ,age undetermined  Abnormal ECG  When compared with ECG of 14-JUL-2024 16:56,  Current undetermined rhythm precludes rhythm comparison, needs review  (RBBB and left anterior fascicular block) has replaced Left bundle branch  block  Minimal criteria for Septal infarct are now Present    Referred By: [email or web address]   SELF           Confirmed By:          Review of Systems   Constitutional: Positive for malaise/fatigue.   HENT:  Negative for hearing loss and hoarse  "voice.    Eyes:  Negative for blurred vision and visual disturbance.   Cardiovascular:  Positive for dyspnea on exertion and orthopnea. Negative for chest pain, claudication, irregular heartbeat, leg swelling, near-syncope, palpitations, paroxysmal nocturnal dyspnea and syncope.   Respiratory:  Positive for shortness of breath. Negative for cough, hemoptysis, sleep disturbances due to breathing, snoring and wheezing.    Endocrine: Negative for cold intolerance and heat intolerance.   Hematologic/Lymphatic: Does not bruise/bleed easily.   Skin:  Negative for color change, dry skin and nail changes.   Musculoskeletal:  Positive for back pain, joint pain and myalgias.   Gastrointestinal:  Negative for bloating, abdominal pain, constipation, nausea and vomiting.   Genitourinary:  Negative for dysuria, flank pain, hematuria and hesitancy.   Neurological:  Negative for headaches, light-headedness, loss of balance, numbness, paresthesias and weakness.   Psychiatric/Behavioral:  Negative for altered mental status.    Allergic/Immunologic: Negative for environmental allergies.         Objective:BP (!) 173/71 (BP Location: Left arm, Patient Position: Sitting)   Pulse (!) 46   Temp 98.1 °F (36.7 °C) (Oral)   Resp 18   Ht 5' 10" (1.778 m)   Wt 66.5 kg (146 lb 9.7 oz)   SpO2 96%   BMI 21.04 kg/m²      Physical Exam  Vitals and nursing note reviewed.   Constitutional:       General: She is not in acute distress.     Appearance: Normal appearance. She is well-developed. She is not ill-appearing.   HENT:      Head: Normocephalic and atraumatic.      Nose: Nose normal.      Mouth/Throat:      Mouth: Mucous membranes are moist.   Eyes:      Pupils: Pupils are equal, round, and reactive to light.   Neck:      Thyroid: No thyromegaly.      Vascular: No JVD.      Trachea: No tracheal deviation.   Cardiovascular:      Rate and Rhythm: Regular rhythm. Bradycardia present.      Chest Wall: PMI is not displaced.      Pulses: Intact " "distal pulses.           Radial pulses are 2+ on the right side and 2+ on the left side.        Dorsalis pedis pulses are 2+ on the right side and 2+ on the left side.      Heart sounds: S1 normal and S2 normal. Heart sounds not distant. No murmur heard.  Pulmonary:      Effort: Pulmonary effort is normal. No respiratory distress.      Breath sounds: Normal breath sounds. No wheezing.   Abdominal:      General: Bowel sounds are normal. There is no distension.      Palpations: Abdomen is soft.      Tenderness: There is no abdominal tenderness.   Musculoskeletal:         General: No swelling. Normal range of motion.      Cervical back: Full passive range of motion without pain, normal range of motion and neck supple.      Right ankle: No swelling.      Left ankle: No swelling.   Skin:     General: Skin is warm and dry.      Capillary Refill: Capillary refill takes less than 2 seconds.      Nails: There is no clubbing.   Neurological:      General: No focal deficit present.      Mental Status: She is alert and oriented to person, place, and time.   Psychiatric:         Speech: Speech normal.         Behavior: Behavior normal.         Thought Content: Thought content normal.         Judgment: Judgment normal.         Lab Results   Component Value Date    CHOL 193 07/07/2023    CHOL 217 (H) 06/16/2022    CHOL 196 12/22/2020     Lab Results   Component Value Date    HDL 78 07/07/2023    HDL 76 06/16/2022    HDL 75 12/22/2020     Lab Results   Component Value Date    LDLCALC 98 07/07/2023    LDLCALC 122 (H) 06/16/2022    LDLCALC 108 (H) 12/22/2020     Lab Results   Component Value Date    TRIG 99 07/07/2023    TRIG 107 06/16/2022    TRIG 74 12/22/2020     No results found for: "CHOLHDL"  [unfilled]  Lab Results   Component Value Date    TSH 2.123 07/14/2024     No results found for: "INR", "PROTIME"  Lab Results   Component Value Date    WBC 5.76 07/16/2024    HGB 10.9 (L) 07/16/2024    HCT 33.0 (L) 07/16/2024    MCV 97 " 07/16/2024     07/16/2024     BNP  Recent Labs   Lab 07/15/24  1437   BNP 2,727*          Assessment:      1. SOB (shortness of breath)    2. Acute on chronic congestive heart failure, unspecified heart failure type    3. Hypoxia    4. CHF (congestive heart failure)    5. Bradycardia    6. Elevated troponin        Plan:     Acute on Chronic CHF  -Continue IV diuresis  -Continue ARB, Hydralazine  -Dash diet 2 gm sodium restriction  -Strict I/os  -Daily weights    2. 2:1 AV Block, LBBB/RBBB  -Would benefit from CRT-P once CHF more controlled  -Continue IV diuresis  -Agreeable to proceed with CRT P once ready  -Avoid AV giacomo agents given bradycardia    Nicole May, FNP-C [facility]

## 2024-07-17 NOTE — PROGRESS NOTES
[facility]  Hospital Medicine  Progress Note    Patient Name: Kaleigh Delgado  MRN: [id number]  Patient Class: IP- Inpatient   Admission Date: 7/13/2024  Length of Stay: 4 days  Attending Physician: Alina Steele MD  Primary Care Provider: Aldair Kaur MD        Subjective:     Principal Problem:CHF (congestive heart failure)        HPI:  Kaleigh Delgado is a 74 y.o. female with a PMH  has no past medical history on file. who presented to the ED for further evaluation of worsening dyspnea/shortness of breath and bilateral leg swelling x2 days duration.  Patient denies prior history of CHF and is not currently on home O2 or diuretics.  Given worsening symptoms, patient called EMS and was found to be hypoxic with O2 saturation 88% on room air and was initiated on supplemental oxygen at 6 L via nasal cannula but was titrated up to 15 L non-rebreather.  Patient reported no known alleviating factors, and aggravating factors including laying flat and with exertion.  She denied endorsing any lightheadedness, dizziness, headache, visual changes, fever, chills, sweats, nausea, vomiting, chest pain, abdominal pain, dysuria, hematuria, melena, hematochezia, diarrhea, or onset neurological deficits.  Prior to onset of symptoms, patient reported being in her usual state of health with no other concerns or complaints.  All other review of systems negative except as noted above.  Initial workup in the ED revealed patient to be tachypneic and hypoxic with elevated D-dimer measuring 1.30.  BNP 03/20/2067, troponin 0.090, flu/COVID negative, UA negative for UTI. CTA positive for mild to moderate pulmonary edema, mild left pleural effusion, small right pleural effusion, but negative for pulmonary embolus.  Patient admitted to Hospital Medicine inpatient for continued medical management and workup of new onset heart failure.    PCP: No, Primary Doctor      Overview/Hospital Course:  74 y.o. female with no past medical  history on file admitted for new onset of CHF. Patient denies history of CHF or home oxygen use. Patient is currently for Hctz 12.5 mg daily for blood pressure management. Ed work up revealed elevated D-dimer measuring 1.30. BNP 2367, troponin 0.090, flu/COVID negative, UA negative for UT  CTA positive for mild to moderate pulmonary edema, mild left pleural effusion, small right pleural effusion, but negative for pulmonary embolus. CXR showed cardiomegaly with perihilar edema. Correlate clinically to CHF. Trace pleural effusions. Correlate clinically to CHF. Echo resulted with a 55% EF. Cardiology following.    Patient bradycardic, STAT EKG. Elevated BP. PRN IV hydralazine ordered. Reported increased SOB after hydralazine administered. Gave lasix 20 mg IV once, then 40 mg IV x2 doses. Started losartan 50 mg for BP control. STAT CXR and supplemental oxygen PRN.    Trend troponin, 0.090> 0.309 >0.203   BNP up trending to 2727  Cardiology consulted  Nuclear stress test- cancelled per cardiology due to AV block , LBBB/RBBB   PT/OT to eval and treat- evaluated and reported patient would benefit from SNF. SW saw patient and declined, stating she would be agreeable to Home health. Patient reports she has family members who can check on her frequently after d/c   IV lasix 40mg q12h  Cardiology consulted Electrophysiology Ayde Dubon NP plan for CRT P once CHF is controlled     Interval History: f/u Sob. Patient awake and alert. NAD. Patient sitting up in bedside chair. Patient reports improvement in Sob, able to maintain normal oxygen saturation on room air. Reports mild right sided CP, states it has improved. Continue IV diuresis. Cardiology following.     Review of Systems  Objective:     Vital Signs (Most Recent):  Temp: 98.1 °F (36.7 °C) (07/17/24 1143)  Pulse: (!) 46 (07/17/24 1143)  Resp: 18 (07/17/24 1143)  BP: (!) 173/71 (07/17/24 1143)  SpO2: 96 % (07/17/24 1143) Vital Signs (24h Range):  Temp:  [98.1 °F (36.7  °C)-98.6 °F (37 °C)] 98.1 °F (36.7 °C)  Pulse:  [41-57] 46  Resp:  [18] 18  SpO2:  [92 %-96 %] 96 %  BP: (139-203)/(64-79) 173/71     Weight: 66.5 kg (146 lb 9.7 oz)  Body mass index is 21.04 kg/m².    Intake/Output Summary (Last 24 hours) at 7/17/2024 1448  Last data filed at 7/17/2024 0425  Gross per 24 hour   Intake 240 ml   Output 800 ml   Net -560 ml         Physical Exam  Vitals and nursing note reviewed.   Constitutional:       General: She is not in acute distress.  HENT:      Mouth/Throat:      Mouth: Mucous membranes are moist.      Pharynx: Oropharynx is clear.   Eyes:      Pupils: Pupils are equal, round, and reactive to light.   Cardiovascular:      Rate and Rhythm: Regular rhythm. Bradycardia present.      Heart sounds: No murmur heard.  Pulmonary:      Effort: Pulmonary effort is normal.      Breath sounds: No wheezing.      Comments: Bilateral breath sounds decreased  Abdominal:      General: Bowel sounds are normal. There is no distension.      Palpations: Abdomen is soft.      Tenderness: There is no abdominal tenderness.   Musculoskeletal:         General: Normal range of motion.   Skin:     General: Skin is warm and dry.      Comments: BLE chronic venous stasis    Neurological:      General: No focal deficit present.      Mental Status: She is alert and oriented to person, place, and time.             Significant Labs: All pertinent labs within the past 24 hours have been reviewed.  Recent Lab Results         07/17/24  0434        Anion Gap 14       BUN 33       Calcium 9.2       Chloride 108       CO2 21       Creatinine 1.0       eGFR 59       Glucose 105       Magnesium  2.0       Potassium 4.0       Sodium 143               Significant Imaging: I have reviewed all pertinent imaging results/findings within the past 24 hours.    Assessment/Plan:      * CHF (congestive heart failure)  Patient is identified as having  evidence of new onset  heart failure that is Acute. CHF is currently  uncontrolled due to Continued edema of extremities, Dyspnea not returned to baseline after single doses of IV diuretic, >3 pillow orthopnea, Rales/crackles on pulmonary exam, and Pulmonary edema/pleural effusion on CXR. Latest ECHO performed and demonstrates 55% EF  Continue Furosemide and monitor clinical status closely. Monitor on telemetry. Patient is on CHF pathway.  Monitor strict Is&Os and daily weights.  Place on fluid restriction of 1.5 L. Cardiology has been consulted. Continue to stress to patient importance of self efficacy and  on diet for CHF. Last BNP reviewed- and noted below   Recent Labs   Lab 07/15/24  1437   BNP 2,727*     -Cardiology consulted   -ECHO: Ejection fraction 55%   -Planned Nuc stress test cancelled per cardiology   -IV lasix 40mg q12h   -Wean O2 as tolerated   -pt/ot to eval and treat - recommend SNF, patient declined stating she would be agreeable to Home health  -Cardiology consulted Electrophysiology Ayde Dubon NP plan for CRT P once CHF is controlled     Bradycardia  -HR 40s with PVCs, asymptomatic  -Cardiology following   -Repeat BNP pending  -Echo yesterday EF 55%  -planned nuc stress test in a.m.   -Avoid AV giacomo blocking agents  -Cardiology consulted Electrophysiology Ayde Dubon NP plan for CRT P once CHF is controlled     Acute hypoxemic respiratory failure  Patient with Hypoxic Respiratory failure which is Acute.  she is not on home oxygen. Supplemental oxygen was provided and noted-      Signs/symptoms of respiratory failure include- tachypnea, increased work of breathing, and respiratory distress. Contributing diagnoses includes - CHF, Pleural effusion, Pneumonia, and Pulmonary Embolus Labs and images were reviewed. Patient Has not had a recent ABG. Will treat underlying causes and adjust management of respiratory failure as follows:  Plan:  -Continue treatment and workup of CHF  -Titrate oxygen requirements as needed  -Incentive spirometry   -Monitor pulse  oximetry  -Nohemy prn      Hypertension  Chronic, uncontrolled. Latest blood pressure and vitals reviewed-     Temp:  [98.1 °F (36.7 °C)-98.6 °F (37 °C)]   Pulse:  [41-57]   Resp:  [18]   BP: (139-203)/(64-79)   SpO2:  [92 %-96 %] .   Home meds for hypertension were reviewed and noted below.   Hypertension Medications               hydroCHLOROthiazide (MICROZIDE) 12.5 mg capsule Take 1 capsule by mouth every morning.     While in the hospital, will manage blood pressure as follows; Continue home antihypertensive regimen    Will utilize p.r.n. blood pressure medication only if patient's blood pressure greater than 160/100 and she develops symptoms such as worsening chest pain or shortness of breath.      Shortness of breath  - IV lasix  - supplemental oxygen as needed   -CXR: Diminishing vascular congestion  -CTA: Cardiomegaly. Mild moderate pulmonary edema. Mild left-sided pleural effusion. Small right-sided pleural effusion. Correlate clinically to CHF. No evidence for pulmonary emboli. Atypical infectious process not excluded.       VTE Risk Mitigation (From admission, onward)           Ordered     enoxaparin injection 40 mg  Daily         07/13/24 2334     IP VTE HIGH RISK PATIENT  Once         07/13/24 2334     Place sequential compression device  Until discontinued         07/13/24 2334                    Discharge Planning   DUTCH:      Code Status: Full Code   Is the patient medically ready for discharge?:     Reason for patient still in hospital (select all that apply): Patient trending condition, Laboratory test, Treatment, and Consult recommendations  Discharge Plan A: Home Health   Discharge Delays: None known at this time      The patient's case has been reviewed by my supervising physician.   Supervising physician will provide additional orders and recommendations at his/her discretion.  Please see supervising physicians documentation and/or orders for further details.           Sammi Mark,  NP  Department of Hospital Medicine   [facility]

## 2024-07-17 NOTE — PT/OT/SLP EVAL
Physical Therapy Evaluation and Treatment    Patient Name: Kaleigh Delgado   MRN: [id number]  Recent Surgery: * No surgery found *      Recommendations:     Discharge Recommendations: Moderate Intensity Therapy   Discharge Equipment Recommendations: to be determined by next level of care   Barriers to discharge: Decreased caregiver support    Assessment:     Kaleigh Delgado is a 74 y.o. female admitted with a medical diagnosis of CHF (congestive heart failure). She presents with the following impairments/functional limitations: weakness, impaired endurance, impaired functional mobility, gait instability, impaired balance, pain, decreased safety awareness, decreased lower extremity function, decreased coordination, impaired cardiopulmonary response to activity, decreased ROM.    Rehab Prognosis: Good; patient would benefit from acute PT services to address these deficits and reach maximum level of function.    Plan:     During this hospitalization, patient to be seen 3 x/week to address the above listed problems via gait training, therapeutic activities, therapeutic exercises    Plan of Care Expires: 07/31/24    Subjective     Chief Complaint: Pt is motivated to participate  Patient Comments/Goals: none stated  Pain/Comfort:  Pain Rating 1: 0/10    Social History:  Living Environment: Patient lives alone in a single story home with ramp. Pt reports living with her  until December, since December her  has been at [facility] for therapy. Pt has family that can check in.   Prior Level of Function: Prior to admission, patient was modified independent, driving and retired, and ambulated household and community distances using rolling walker and rollator  Equipment Used at Home: walker, rolling, rollator, grab bar (built in shower chair)  DME owned (not currently used): none  Assistance Upon Discharge: family    Objective:     Communicated with nurse Duong and epic chart review prior to session. Patient found supine with  "peripheral IV, telemetry upon PT entry to room.    General Precautions: Standard, fall   Orthopedic Precautions: N/A   Braces: N/A    Respiratory Status: Room air    Exams:  Cognition: Patient is oriented to Person, Place, Time, Situation  RLE ROM: WFL  RLE Strength:  Grossly 3+/5  LLE ROM: WFL  LLE Strength:  Grossly 3+/5  Postural Exam: Patient presented with the following abnormalities:    -       Rounded shoulders  -       Forward head  -       Kyphosis  Sensation:    -       Impaired  Reports B feet numbness  Skin Integrity/Edema:     -       Skin integrity: Visible skin intact    Functional Mobility:  Gait belt applied - Yes  Bed Mobility  Rolling Left: contact guard assistance  Scooting: contact guard assistance  Supine to Sit: contact guard assistance  Transfers  Sit to Stand: contact guard assistance with rolling walker  Bed to Chair: contact guard assistance with rolling walker using Step Transfer  Toilet Transfer: contact guard assistance with rolling walker using Step Transfer, able to perform toileting hygiene without assistance  Gait  Patient ambulated 80ft with rolling walker and contact guard assistance progressing to MIN with fatigue. Patient demonstrates occasional unsteady gait. No c/o dizziness, mild SOB, educated about pursed lip breathing technique and cued for use with mobility, frequent rest breaks due to fatigue. Decreased safety awareness. All lines remained intact throughout ambulation trail.  Balance  Sitting: contact guard assistance  Standing: contact guard assistance    Therapeutic Activities and Exercises:   Pt educated on role of PT in acute care and POC. Educated on importance of OOB activities, activity pacing, and HEP (marching/hip flex, hip abd, heel slides/LAQ, quad sets, ankle pumps) in order to maintain/regain strength. Encouraged to sit up in chair for all meals. Educated on proper use of RW for safety and to reduce risk of falling. Educated on "call don't fall" policy and " increased risk of falling due to weakness, instructed to utilize call bell for assistance with all transfers. Pt agreeable to all requests.    AM-PAC 6 CLICK MOBILITY  Total Score:16    Patient left up in chair with all lines intact, call button in reach, chair alarm on, and RT present.    GOALS:   Multidisciplinary Problems       Physical Therapy Goals          Problem: Physical Therapy    Goal Priority Disciplines Outcome Goal Variances Interventions   Physical Therapy Goal     PT, PT/OT      Description: Goals to be met by 7/31/24.  1. Pt will complete bed mobility SBA.  2. Pt will complete sit to stand SBA.  3. Pt will ambulate 200ft SBA using RW.  4. Pt will increase AMPAC score by 2 points to progress functional mobility.                       History:   History reviewed. No pertinent past medical history.    Past Surgical History:   Procedure Laterality Date    exploratory lap      FIRST RIB REMOVAL      KNEE SURGERY      TONSILLECTOMY      VARICOSE VEIN SURGERY         Time Tracking:     PT Received On: 07/17/24  PT Start Time: 0920  PT Stop Time: 0945  PT Total Time (min): 25 min     Billable Minutes: Evaluation 15min and Therapeutic Activity 10min    7/17/2024

## 2024-07-17 NOTE — ASSESSMENT & PLAN NOTE
BNP 2367, received IV lasix x4. On nasal cannula  GDMT ARB, not on BB given bradycardia  Kidney function stable  Repeat BNP pending  Echo yesterday EF 55%  Nuc stress tomorrow  F/u in [facility]    7/16/24  Hold off on nuc stress today, diurese  BNP elevated  IV diuresis cont ARB  Strict I/Os monitor renal function    7/17/24  Cont IV diuresis, ARB

## 2024-07-17 NOTE — ASSESSMENT & PLAN NOTE
-HR 40s with PVCs, asymptomatic  -Cardiology following   -Repeat BNP pending  -Echo yesterday EF 55%  -planned nuc stress test in a.m.   -Avoid AV giacomo blocking agents  -Cardiology consulted Electrophysiology Ayde Dubon NP plan for CRT P once CHF is controlled

## 2024-07-17 NOTE — SUBJECTIVE & OBJECTIVE
Interval History: f/u Sob. Patient awake and alert. NAD. Patient sitting up in bedside chair. Patient reports improvement in Sob, able to maintain normal oxygen saturation on room air. Reports mild right sided CP, states it has improved. Continue IV diuresis. Cardiology following.     Review of Systems  Objective:     Vital Signs (Most Recent):  Temp: 98.1 °F (36.7 °C) (07/17/24 1143)  Pulse: (!) 46 (07/17/24 1143)  Resp: 18 (07/17/24 1143)  BP: (!) 173/71 (07/17/24 1143)  SpO2: 96 % (07/17/24 1143) Vital Signs (24h Range):  Temp:  [98.1 °F (36.7 °C)-98.6 °F (37 °C)] 98.1 °F (36.7 °C)  Pulse:  [41-57] 46  Resp:  [18] 18  SpO2:  [92 %-96 %] 96 %  BP: (139-203)/(64-79) 173/71     Weight: 66.5 kg (146 lb 9.7 oz)  Body mass index is 21.04 kg/m².    Intake/Output Summary (Last 24 hours) at 7/17/2024 1448  Last data filed at 7/17/2024 0425  Gross per 24 hour   Intake 240 ml   Output 800 ml   Net -560 ml         Physical Exam  Vitals and nursing note reviewed.   Constitutional:       General: She is not in acute distress.  HENT:      Mouth/Throat:      Mouth: Mucous membranes are moist.      Pharynx: Oropharynx is clear.   Eyes:      Pupils: Pupils are equal, round, and reactive to light.   Cardiovascular:      Rate and Rhythm: Regular rhythm. Bradycardia present.      Heart sounds: No murmur heard.  Pulmonary:      Effort: Pulmonary effort is normal.      Breath sounds: No wheezing.      Comments: Bilateral breath sounds decreased  Abdominal:      General: Bowel sounds are normal. There is no distension.      Palpations: Abdomen is soft.      Tenderness: There is no abdominal tenderness.   Musculoskeletal:         General: Normal range of motion.   Skin:     General: Skin is warm and dry.      Comments: BLE chronic venous stasis    Neurological:      General: No focal deficit present.      Mental Status: She is alert and oriented to person, place, and time.             Significant Labs: All pertinent labs within the past  24 hours have been reviewed.  Recent Lab Results         07/17/24  0434        Anion Gap 14       BUN 33       Calcium 9.2       Chloride 108       CO2 21       Creatinine 1.0       eGFR 59       Glucose 105       Magnesium  2.0       Potassium 4.0       Sodium 143               Significant Imaging: I have reviewed all pertinent imaging results/findings within the past 24 hours.

## 2024-07-17 NOTE — ASSESSMENT & PLAN NOTE
HR 40s with PVCs, asymptomatic  Avoid AV giacomo blocking agents  F/u with primary cardiologist to monitor  Home med list with only HCTZ    7/16/24  EKG during stress today with int 2:1 AVB    7/17/24  EP consulted given int 2:1AVB, AV disassociation   Appreciate recs

## 2024-07-17 NOTE — PROGRESS NOTES
[facility]  Cardiology  Progress Note    Patient Name: Kaleigh Delgado  MRN: [id number]  Admission Date: 7/13/2024  Hospital Length of Stay: 4 days  Code Status: Full Code   Attending Physician: Alina Steele MD   Primary Care Physician: Aldair Kaur MD  Expected Discharge Date:   Principal Problem:CHF (congestive heart failure)    Subjective:     Hospital Course:   Patient is a 74 year old female with a past medical history of hypertension, history of CHF, followed in the past by [facility], apparent history of afib, but now in sinus rhythm. BNP greater than 2000. Chest x-ray shows CHF. EKG shows sinus bradycardia with PAC's. There is a mild increase in troponin 0.09. Patient states history of heart cath by [facility] in 2021 that was treated medically.    Recommend continue Iv diuresis, check echocardiogram, and blood pressure control. Patient will eventually need nuclear stress test. We will need to obtain cath report from [facility].      History reviewed. No pertinent past medical history.  7/15/24 pt seen and examined today, resting in bed. On supplemental O2 via NC. Denies any CP at this time. C/o aches and fatigue, tele reviewed HR 40s on monitor. Labs reviewed, -2.3L for admission, troponin 0.309, Crt 1.0. Received 4 doses of lasix since admission not on any scheduled currently. Previously followed by [facility] had LHC done in 21', she is unsure of results. will get records. Nuc stress planned for tomorrow    7/16/24 Pt seen and examined today, does not feel well. Has lots of outside stressors,  is sick. Planned on nuc stress today EKG with intermittent 2:1 AVB, pt feeling anxious will reschedule once diuresed. BNP yesterday 6207, needs diuresis.     7/17/24 pt seen and examined today, doing better off oxygen during exam sitting up in chair. Diuresing well. Labs reviewed, chart reviewed. No acute CV events reported. Tele with occ 2:1AVB will get EP consult            Review of Systems   Constitutional:  Positive for malaise/fatigue.   HENT: Negative.     Eyes: Negative.    Cardiovascular: Negative.    Respiratory:  Positive for shortness of breath.    Skin: Negative.    Musculoskeletal:  Positive for arthritis, back pain, muscle cramps and myalgias.   Gastrointestinal: Negative.    Genitourinary: Negative.    Neurological:  Positive for weakness.   Psychiatric/Behavioral: Negative.       Objective:     Vital Signs (Most Recent):  Temp: 98.1 °F (36.7 °C) (07/17/24 1143)  Pulse: (!) 46 (07/17/24 1143)  Resp: 18 (07/17/24 1143)  BP: (!) 173/71 (07/17/24 1143)  SpO2: 96 % (07/17/24 1143) Vital Signs (24h Range):  Temp:  [98.1 °F (36.7 °C)-98.6 °F (37 °C)] 98.1 °F (36.7 °C)  Pulse:  [41-57] 46  Resp:  [18] 18  SpO2:  [92 %-96 %] 96 %  BP: (139-203)/(64-79) 173/71     Weight: 66.5 kg (146 lb 9.7 oz)  Body mass index is 21.04 kg/m².     SpO2: 96 %         Intake/Output Summary (Last 24 hours) at 7/17/2024 1453  Last data filed at 7/17/2024 0425  Gross per 24 hour   Intake 240 ml   Output 800 ml   Net -560 ml       Lines/Drains/Airways       Peripheral Intravenous Line  Duration                  Peripheral IV - Single Lumen 07/13/24 1800 18 G 1 1/4 in Anterior;Proximal;Right Forearm 3 days         Peripheral IV - Single Lumen 07/13/24 1805 20 G 1 1/4 in Left Antecubital 3 days                       Physical Exam  Vitals and nursing note reviewed.   Constitutional:       Appearance: Normal appearance. She is ill-appearing.   HENT:      Head: Normocephalic.   Eyes:      Pupils: Pupils are equal, round, and reactive to light.   Cardiovascular:      Rate and Rhythm: Regular rhythm. Bradycardia present.      Heart sounds: Normal heart sounds, S1 normal and S2 normal. No murmur heard.     No S3 or S4 sounds.   Pulmonary:      Effort: Pulmonary effort is normal.      Breath sounds: Rales present.   Abdominal:      General: Bowel sounds are normal.      Palpations: Abdomen is soft.   Musculoskeletal:         General: Normal range  "of motion.      Cervical back: Normal range of motion.   Skin:     Capillary Refill: Capillary refill takes less than 2 seconds.   Neurological:      General: No focal deficit present.      Mental Status: She is alert and oriented to person, place, and time.   Psychiatric:         Mood and Affect: Mood is anxious. Affect is tearful.         Behavior: Behavior normal.         Thought Content: Thought content normal.            Significant Labs: BMP:   Recent Labs   Lab 07/16/24  0547 07/17/24  0434   GLU 95 105    143   K 3.8 4.0    108   CO2 24 21*   BUN 34* 33*   CREATININE 1.2 1.0   CALCIUM 8.5* 9.2   MG 2.1 2.0   , CMP   Recent Labs   Lab 07/16/24  0547 07/17/24  0434    143   K 3.8 4.0    108   CO2 24 21*   GLU 95 105   BUN 34* 33*   CREATININE 1.2 1.0   CALCIUM 8.5* 9.2   ANIONGAP 9 14   , CBC   Recent Labs   Lab 07/16/24  0547   WBC 5.76   HGB 10.9*   HCT 33.0*      , INR No results for input(s): "INR", "PROTIME" in the last 48 hours., Lipid Panel No results for input(s): "CHOL", "HDL", "LDLCALC", "TRIG", "CHOLHDL" in the last 48 hours., Troponin   No results for input(s): "TROPONINI" in the last 48 hours.  , and All pertinent lab results from the last 24 hours have been reviewed.    Significant Imaging: Cardiac Cath: reviewed, Echocardiogram: Transthoracic echo (TTE) complete (C[facility] Only):   Results for orders placed or performed during the hospital encounter of 07/13/24   Echo   Result Value Ref Range    BSA 1.86 m2    LVIDd 4.20 3.5 - 6.0 cm    LV Systolic Volume 47.52 mL    LV Systolic Volume Index 25.4 mL/m2    LVIDs 3.40 2.1 - 4.0 cm    LV Diastolic Volume 78.48 mL    LV Diastolic Volume Index 41.97 mL/m2    Left Ventricular End Systolic Volume by Teichholz Method 47.52 mL    Left Ventricular End Diastolic Volume by Teichholz Method 78.48 mL    IVS 1.53 (A) 0.6 - 1.1 cm    LVOT diameter 2.07 cm    LVOT area 3.4 cm2    FS 19 (A) 28 - 44 %    Left Ventricle Relative Wall " Thickness 0.70 cm    Posterior Wall 1.47 (A) 0.6 - 1.1 cm    LV mass 249.50 g    LV Mass Index 133 g/m2    MV Peak E Bobby 1.00 m/s    TDI LATERAL 0.09 m/s    TDI SEPTAL 0.11 m/s    E/E' ratio 10.00 m/s    MV Peak A Bobby 0.83 m/s    TR Max Bobby 3.24 m/s    E/A ratio 1.20     IVRT 87.54 msec    E wave deceleration time 256.49 msec    LV SEPTAL E/E' RATIO 9.09 m/s    LV LATERAL E/E' RATIO 11.11 m/s    LA size 4.89 cm    Left Atrium Minor Axis 6.92 cm    Left Atrium Major Axis 7.90 cm    RA Major Axis 6.58 cm    AV regurgitation pressure 1/2 time 484.273092305950675 ms    AR Max Bobby 4.16 m/s    AV mean gradient 11 mmHg    AV peak gradient 20 mmHg    Ao peak bobby 2.25 m/s    Ao VTI 59.70 cm    MV stenosis pressure 1/2 time 74.38 ms    MV valve area p 1/2 method 2.96 cm2    Triscuspid Valve Regurgitation Peak Gradient 42 mmHg    PV PEAK VELOCITY 1.41 m/s    PV peak gradient 8 mmHg    Pulmonary Valve Mean Velocity 0.91 m/s    STJ 3.00 cm    IVC diameter 1.08 cm    Mean e' 0.10 m/s    ZLVIDS 0.44     ZLVIDD -2.16     EF 55 %    TV resting pulmonary artery pressure 45 mmHg    RV TB RVSP 6 mmHg    Est. RA pres 3 mmHg    Narrative      Left Ventricle: The left ventricle is normal in size. Normal wall   thickness. There is concentric hypertrophy. There is normal systolic   function. Ejection fraction by visual approximation is 55%.    Right Ventricle: Normal right ventricular cavity size. Wall thickness   is normal. Systolic function is normal.    Aortic Valve: There is mild aortic regurgitation with a centrally   directed jet.    Pulmonary Artery: The estimated pulmonary artery systolic pressure is   45 mmHg.    IVC/SVC: Normal venous pressure at 3 mmHg.     , EKG: reviewed, Stress Test: reviewed, and X-Ray: CXR: X-Ray Chest 1 View (CXR):   Results for orders placed or performed during the hospital encounter of 07/13/24   X-Ray Chest 1 View    Narrative    EXAMINATION:  XR CHEST 1 VIEW    CLINICAL HISTORY:  shortness of  breath;    TECHNIQUE:  Single frontal portable view of the chest was performed.    COMPARISON:  Yesterday    FINDINGS:  Mediastinum rotated.  Pulmonary interstitial markings diminishing.  Minimal blunting of the costophrenic angles redemonstrated similar.      Impression    Diminishing vascular congestion      Electronically signed by: Sherie Carty  Date:    07/14/2024  Time:    17:30     Assessment and Plan:       * CHF (congestive heart failure)  BNP 2367, received IV lasix x4. On nasal cannula  GDMT ARB, not on BB given bradycardia  Kidney function stable  Repeat BNP pending  Echo yesterday EF 55%  Nuc stress tomorrow  F/u in [facility]    7/16/24  Hold off on nuc stress today, diurese  BNP elevated  IV diuresis cont ARB  Strict I/Os monitor renal function    7/17/24  Cont IV diuresis, ARB      Bradycardia  HR 40s with PVCs, asymptomatic  Avoid AV giacomo blocking agents  F/u with primary cardiologist to monitor  Home med list with only HCTZ    7/16/24  EKG during stress today with int 2:1 AVB    7/17/24  EP consulted given int 2:1AVB, AV disassociation   Appreciate recs      Acute hypoxemic respiratory failure  Management per primary team    Hypertension  Titrate medications, cont ARB    Shortness of breath  Improving since admission  BNP pending  Wean O2 as tolerated    7/16/24  BNP still elevated  diurese        VTE Risk Mitigation (From admission, onward)           Ordered     enoxaparin injection 40 mg  Daily         07/13/24 2334     IP VTE HIGH RISK PATIENT  Once         07/13/24 2334     Place sequential compression device  Until discontinued         07/13/24 2334                    Yaa Salas, NP  Cardiology  [facility]

## 2024-07-17 NOTE — SUBJECTIVE & OBJECTIVE
Review of Systems   Constitutional: Positive for malaise/fatigue.   HENT: Negative.     Eyes: Negative.    Cardiovascular: Negative.    Respiratory:  Positive for shortness of breath.    Skin: Negative.    Musculoskeletal:  Positive for arthritis, back pain, muscle cramps and myalgias.   Gastrointestinal: Negative.    Genitourinary: Negative.    Neurological:  Positive for weakness.   Psychiatric/Behavioral: Negative.       Objective:     Vital Signs (Most Recent):  Temp: 98.1 °F (36.7 °C) (07/17/24 1143)  Pulse: (!) 46 (07/17/24 1143)  Resp: 18 (07/17/24 1143)  BP: (!) 173/71 (07/17/24 1143)  SpO2: 96 % (07/17/24 1143) Vital Signs (24h Range):  Temp:  [98.1 °F (36.7 °C)-98.6 °F (37 °C)] 98.1 °F (36.7 °C)  Pulse:  [41-57] 46  Resp:  [18] 18  SpO2:  [92 %-96 %] 96 %  BP: (139-203)/(64-79) 173/71     Weight: 66.5 kg (146 lb 9.7 oz)  Body mass index is 21.04 kg/m².     SpO2: 96 %         Intake/Output Summary (Last 24 hours) at 7/17/2024 1453  Last data filed at 7/17/2024 0425  Gross per 24 hour   Intake 240 ml   Output 800 ml   Net -560 ml       Lines/Drains/Airways       Peripheral Intravenous Line  Duration                  Peripheral IV - Single Lumen 07/13/24 1800 18 G 1 1/4 in Anterior;Proximal;Right Forearm 3 days         Peripheral IV - Single Lumen 07/13/24 1805 20 G 1 1/4 in Left Antecubital 3 days                       Physical Exam  Vitals and nursing note reviewed.   Constitutional:       Appearance: Normal appearance. She is ill-appearing.   HENT:      Head: Normocephalic.   Eyes:      Pupils: Pupils are equal, round, and reactive to light.   Cardiovascular:      Rate and Rhythm: Regular rhythm. Bradycardia present.      Heart sounds: Normal heart sounds, S1 normal and S2 normal. No murmur heard.     No S3 or S4 sounds.   Pulmonary:      Effort: Pulmonary effort is normal.      Breath sounds: Rales present.   Abdominal:      General: Bowel sounds are normal.      Palpations: Abdomen is soft.  "  Musculoskeletal:         General: Normal range of motion.      Cervical back: Normal range of motion.   Skin:     Capillary Refill: Capillary refill takes less than 2 seconds.   Neurological:      General: No focal deficit present.      Mental Status: She is alert and oriented to person, place, and time.   Psychiatric:         Mood and Affect: Mood is anxious. Affect is tearful.         Behavior: Behavior normal.         Thought Content: Thought content normal.            Significant Labs: BMP:   Recent Labs   Lab 07/16/24  0547 07/17/24  0434   GLU 95 105    143   K 3.8 4.0    108   CO2 24 21*   BUN 34* 33*   CREATININE 1.2 1.0   CALCIUM 8.5* 9.2   MG 2.1 2.0   , CMP   Recent Labs   Lab 07/16/24  0547 07/17/24  0434    143   K 3.8 4.0    108   CO2 24 21*   GLU 95 105   BUN 34* 33*   CREATININE 1.2 1.0   CALCIUM 8.5* 9.2   ANIONGAP 9 14   , CBC   Recent Labs   Lab 07/16/24  0547   WBC 5.76   HGB 10.9*   HCT 33.0*      , INR No results for input(s): "INR", "PROTIME" in the last 48 hours., Lipid Panel No results for input(s): "CHOL", "HDL", "LDLCALC", "TRIG", "CHOLHDL" in the last 48 hours., Troponin   No results for input(s): "TROPONINI" in the last 48 hours.  , and All pertinent lab results from the last 24 hours have been reviewed.    Significant Imaging: Cardiac Cath: reviewed, Echocardiogram: Transthoracic echo (TTE) complete (C[facility] Only):   Results for orders placed or performed during the hospital encounter of 07/13/24   Echo   Result Value Ref Range    BSA 1.86 m2    LVIDd 4.20 3.5 - 6.0 cm    LV Systolic Volume 47.52 mL    LV Systolic Volume Index 25.4 mL/m2    LVIDs 3.40 2.1 - 4.0 cm    LV Diastolic Volume 78.48 mL    LV Diastolic Volume Index 41.97 mL/m2    Left Ventricular End Systolic Volume by Teichholz Method 47.52 mL    Left Ventricular End Diastolic Volume by Teichholz Method 78.48 mL    IVS 1.53 (A) 0.6 - 1.1 cm    LVOT diameter 2.07 cm    LVOT area 3.4 cm2    FS 19 " (A) 28 - 44 %    Left Ventricle Relative Wall Thickness 0.70 cm    Posterior Wall 1.47 (A) 0.6 - 1.1 cm    LV mass 249.50 g    LV Mass Index 133 g/m2    MV Peak E Bobby 1.00 m/s    TDI LATERAL 0.09 m/s    TDI SEPTAL 0.11 m/s    E/E' ratio 10.00 m/s    MV Peak A Bobby 0.83 m/s    TR Max Bobby 3.24 m/s    E/A ratio 1.20     IVRT 87.54 msec    E wave deceleration time 256.49 msec    LV SEPTAL E/E' RATIO 9.09 m/s    LV LATERAL E/E' RATIO 11.11 m/s    LA size 4.89 cm    Left Atrium Minor Axis 6.92 cm    Left Atrium Major Axis 7.90 cm    RA Major Axis 6.58 cm    AV regurgitation pressure 1/2 time 484.360950240758653 ms    AR Max Bobby 4.16 m/s    AV mean gradient 11 mmHg    AV peak gradient 20 mmHg    Ao peak bobby 2.25 m/s    Ao VTI 59.70 cm    MV stenosis pressure 1/2 time 74.38 ms    MV valve area p 1/2 method 2.96 cm2    Triscuspid Valve Regurgitation Peak Gradient 42 mmHg    PV PEAK VELOCITY 1.41 m/s    PV peak gradient 8 mmHg    Pulmonary Valve Mean Velocity 0.91 m/s    STJ 3.00 cm    IVC diameter 1.08 cm    Mean e' 0.10 m/s    ZLVIDS 0.44     ZLVIDD -2.16     EF 55 %    TV resting pulmonary artery pressure 45 mmHg    RV TB RVSP 6 mmHg    Est. RA pres 3 mmHg    Narrative      Left Ventricle: The left ventricle is normal in size. Normal wall   thickness. There is concentric hypertrophy. There is normal systolic   function. Ejection fraction by visual approximation is 55%.    Right Ventricle: Normal right ventricular cavity size. Wall thickness   is normal. Systolic function is normal.    Aortic Valve: There is mild aortic regurgitation with a centrally   directed jet.    Pulmonary Artery: The estimated pulmonary artery systolic pressure is   45 mmHg.    IVC/SVC: Normal venous pressure at 3 mmHg.     , EKG: reviewed, Stress Test: reviewed, and X-Ray: CXR: X-Ray Chest 1 View (CXR):   Results for orders placed or performed during the hospital encounter of 07/13/24   X-Ray Chest 1 View    Narrative    EXAMINATION:  XR CHEST 1  VIEW    CLINICAL HISTORY:  shortness of breath;    TECHNIQUE:  Single frontal portable view of the chest was performed.    COMPARISON:  Yesterday    FINDINGS:  Mediastinum rotated.  Pulmonary interstitial markings diminishing.  Minimal blunting of the costophrenic angles redemonstrated similar.      Impression    Diminishing vascular congestion      Electronically signed by: Sherie Carty  Date:    07/14/2024  Time:    17:30

## 2024-07-17 NOTE — PLAN OF CARE
07/17/24 1356   Post-Acute Status   Post-Acute Authorization Placement  (SNF placement)   Post-Acute Placement Status Patient declined/refused   Coverage [employer]   Discharge Delays None known at this time   Discharge Plan   Discharge Plan A Home Health       SW meet with Patient at bedside to discuss SNF placement upon DC. Patient stated her spouse is currently being transferred to [facility] for continued therapy and wound care. Patient stated at this time she feels that she can go home with home health and does not wish for SNF placement. Patient states she has family/ friends who can check in on her upon DC home.  SW verbalized understanding and stated she would update Attending/ NP.   SW stated if patient changed her mind about SNF placement during this stay, we could begin the SNF process during this admission.     SW will continue to follow and assist as needed.

## 2024-07-17 NOTE — ASSESSMENT & PLAN NOTE
Patient is identified as having  evidence of new onset  heart failure that is Acute. CHF is currently uncontrolled due to Continued edema of extremities, Dyspnea not returned to baseline after single doses of IV diuretic, >3 pillow orthopnea, Rales/crackles on pulmonary exam, and Pulmonary edema/pleural effusion on CXR. Latest ECHO performed and demonstrates 55% EF  Continue Furosemide and monitor clinical status closely. Monitor on telemetry. Patient is on CHF pathway.  Monitor strict Is&Os and daily weights.  Place on fluid restriction of 1.5 L. Cardiology has been consulted. Continue to stress to patient importance of self efficacy and  on diet for CHF. Last BNP reviewed- and noted below   Recent Labs   Lab 07/15/24  1437   BNP 2,727*     -Cardiology consulted   -ECHO: Ejection fraction 55%   -Planned Nuc stress test cancelled per cardiology   -IV lasix 40mg q12h   -Wean O2 as tolerated   -pt/ot to eval and treat - recommend SNF, patient declined stating she would be agreeable to Home health  -Cardiology consulted Electrophysiology Ayde Dubon NP plan for CRT P once CHF is controlled

## 2024-07-17 NOTE — H&P (VIEW-ONLY)
Subjective:   Patient ID:  Kaleigh Delgado is a 74 y.o. female who presents for evaluation of Shortness of Breath (Pt with a hx of pulmonary edema and unspecified cardiac hx arrived in the ED via [facility] on 15L O2 via NRB with c/o SOB and BLE edema x last couple days. Pt reports swelling has worsened today. +4 pitting edema noted to BLE. Pt denies home O2, CHF, blood thinners, or diuretics. [facility] medic reports FD was the first on scene and pt's O2 saturation was in the 80s on RA. )        Asked to see patient for bradycardia by Dr rodriguez  I reviewed the chart in detail including all relevant clinical notes, cardiac study reports, lab data and Xrays.  I have reviewed the actual images of all relevant ECG, rhythm, holter and long term monitoring tracings obtained during current hospitalization and in past records.   I have reviewed the actual images of all relevant CXRays.     I have discussed this patient's case in detail with ms Dubon and I agree with the her summary as listed. I have also made edits where appropriate.   The following applies:  Pat has high grade but seemingl;y currently asymptomatic infranodal block.  There is no history of the syncope.  Conduction is varying between 2 to 1 and 3 to 1.  There is variable distal aberration (bundle branch block alternating with left bundle-branch block on various instances. ).  Patient definitely needs a CRT P implant.  However she is still in significant the fluid overload and needs to be diuresed appropriately before her being submitted to sedation for an elective surgery.          Kaleigh Delgado is a 74 year old female who presented to [facility] on 7/14/2024 due to shortness of breath x 2 days with associated bilateral leg swelling. She was found to be hypoxic and initiated on supplemental oxygen at 6L NC but was up titrated to 15L NRB mask in ED.     Initial ED workup revealed D Dimer 1.30, BNP 2367, CTA positive for mild to moderate Pulmonary edema. She was admitted under  the care of hospital medicine.     She has been receiving IV diuresis since admission, repeat BNP 2727     EP consult today given intermittent 2:1 AVB and bradycardia since admission.     Patient seen and examined today, remains short of breath with exertional activities. Has weaned off oxygen support.       Results for orders placed during the hospital encounter of 07/13/24    Echo    Interpretation Summary    Left Ventricle: The left ventricle is normal in size. Normal wall thickness. There is concentric hypertrophy. There is normal systolic function. Ejection fraction by visual approximation is 55%.    Right Ventricle: Normal right ventricular cavity size. Wall thickness is normal. Systolic function is normal.    Aortic Valve: There is mild aortic regurgitation with a centrally directed jet.    Pulmonary Artery: The estimated pulmonary artery systolic pressure is 45 mmHg.    IVC/SVC: Normal venous pressure at 3 mmHg.      History reviewed. No pertinent past medical history.    Past Surgical History:   Procedure Laterality Date    exploratory lap      FIRST RIB REMOVAL      KNEE SURGERY      TONSILLECTOMY      VARICOSE VEIN SURGERY         Social History     Tobacco Use    Smoking status: Never   Substance Use Topics    Alcohol use: No    Drug use: No       Family History   Family history unknown: Yes       Wt Readings from Last 3 Encounters:   07/15/24 66.5 kg (146 lb 9.7 oz)   07/22/13 63.5 kg (140 lb)   05/07/12 69.1 kg (152 lb 5.1 oz)     Temp Readings from Last 3 Encounters:   07/17/24 98.6 °F (37 °C) (Oral)   01/23/24 98.2 °F (36.8 °C) (Oral)   07/22/13 98.6 °F (37 °C) (Oral)     BP Readings from Last 3 Encounters:   07/17/24 (!) 200/79   01/23/24 (!) 157/61   07/22/13 (!) 169/94     Pulse Readings from Last 3 Encounters:   07/17/24 (!) 44   01/23/24 101   07/22/13 80       No current facility-administered medications on file prior to encounter.     Current Outpatient Medications on File Prior to Encounter    Medication Sig Dispense Refill    hydroCHLOROthiazide (MICROZIDE) 12.5 mg capsule Take 1 capsule by mouth every morning.         No cardiac monitor results found for the past 12 months    Results for orders placed during the hospital encounter of 07/13/24    Echo    Interpretation Summary    Left Ventricle: The left ventricle is normal in size. Normal wall thickness. There is concentric hypertrophy. There is normal systolic function. Ejection fraction by visual approximation is 55%.    Right Ventricle: Normal right ventricular cavity size. Wall thickness is normal. Systolic function is normal.    Aortic Valve: There is mild aortic regurgitation with a centrally directed jet.    Pulmonary Artery: The estimated pulmonary artery systolic pressure is 45 mmHg.    IVC/SVC: Normal venous pressure at 3 mmHg.    No results found for this or any previous visit.        Results for orders placed or performed during the hospital encounter of 07/13/24   EKG 12-lead    Collection Time: 07/17/24  9:38 AM   Result Value Ref Range    QRS Duration 136 ms    OHS QTC Calculation 504 ms    Narrative    Test Reason : R00.1,    Vent. Rate : 049 BPM     Atrial Rate : 049 BPM     P-R Int : 000 ms          QRS Dur : 136 ms      QT Int : 558 ms       P-R-T Axes : 020 -64 041 degrees     QTc Int : 504 ms    Undetermined rhythm  Right bundle branch block  Left anterior fascicular block   Bifascicular block   LVH with repolarization abnormality ( R in aVL )  Cannot rule out Septal infarct ,age undetermined  Abnormal ECG  When compared with ECG of 14-JUL-2024 16:56,  Current undetermined rhythm precludes rhythm comparison, needs review  (RBBB and left anterior fascicular block) has replaced Left bundle branch  block  Minimal criteria for Septal infarct are now Present    Referred By: [email or web address]   SELF           Confirmed By:          Review of Systems   Constitutional: Positive for malaise/fatigue.   HENT:  Negative for hearing loss and hoarse  "voice.    Eyes:  Negative for blurred vision and visual disturbance.   Cardiovascular:  Positive for dyspnea on exertion and orthopnea. Negative for chest pain, claudication, irregular heartbeat, leg swelling, near-syncope, palpitations, paroxysmal nocturnal dyspnea and syncope.   Respiratory:  Positive for shortness of breath. Negative for cough, hemoptysis, sleep disturbances due to breathing, snoring and wheezing.    Endocrine: Negative for cold intolerance and heat intolerance.   Hematologic/Lymphatic: Does not bruise/bleed easily.   Skin:  Negative for color change, dry skin and nail changes.   Musculoskeletal:  Positive for back pain, joint pain and myalgias.   Gastrointestinal:  Negative for bloating, abdominal pain, constipation, nausea and vomiting.   Genitourinary:  Negative for dysuria, flank pain, hematuria and hesitancy.   Neurological:  Negative for headaches, light-headedness, loss of balance, numbness, paresthesias and weakness.   Psychiatric/Behavioral:  Negative for altered mental status.    Allergic/Immunologic: Negative for environmental allergies.         Objective:BP (!) 173/71 (BP Location: Left arm, Patient Position: Sitting)   Pulse (!) 46   Temp 98.1 °F (36.7 °C) (Oral)   Resp 18   Ht 5' 10" (1.778 m)   Wt 66.5 kg (146 lb 9.7 oz)   SpO2 96%   BMI 21.04 kg/m²      Physical Exam  Vitals and nursing note reviewed.   Constitutional:       General: She is not in acute distress.     Appearance: Normal appearance. She is well-developed. She is not ill-appearing.   HENT:      Head: Normocephalic and atraumatic.      Nose: Nose normal.      Mouth/Throat:      Mouth: Mucous membranes are moist.   Eyes:      Pupils: Pupils are equal, round, and reactive to light.   Neck:      Thyroid: No thyromegaly.      Vascular: No JVD.      Trachea: No tracheal deviation.   Cardiovascular:      Rate and Rhythm: Regular rhythm. Bradycardia present.      Chest Wall: PMI is not displaced.      Pulses: Intact " "distal pulses.           Radial pulses are 2+ on the right side and 2+ on the left side.        Dorsalis pedis pulses are 2+ on the right side and 2+ on the left side.      Heart sounds: S1 normal and S2 normal. Heart sounds not distant. No murmur heard.  Pulmonary:      Effort: Pulmonary effort is normal. No respiratory distress.      Breath sounds: Normal breath sounds. No wheezing.   Abdominal:      General: Bowel sounds are normal. There is no distension.      Palpations: Abdomen is soft.      Tenderness: There is no abdominal tenderness.   Musculoskeletal:         General: No swelling. Normal range of motion.      Cervical back: Full passive range of motion without pain, normal range of motion and neck supple.      Right ankle: No swelling.      Left ankle: No swelling.   Skin:     General: Skin is warm and dry.      Capillary Refill: Capillary refill takes less than 2 seconds.      Nails: There is no clubbing.   Neurological:      General: No focal deficit present.      Mental Status: She is alert and oriented to person, place, and time.   Psychiatric:         Speech: Speech normal.         Behavior: Behavior normal.         Thought Content: Thought content normal.         Judgment: Judgment normal.         Lab Results   Component Value Date    CHOL 193 07/07/2023    CHOL 217 (H) 06/16/2022    CHOL 196 12/22/2020     Lab Results   Component Value Date    HDL 78 07/07/2023    HDL 76 06/16/2022    HDL 75 12/22/2020     Lab Results   Component Value Date    LDLCALC 98 07/07/2023    LDLCALC 122 (H) 06/16/2022    LDLCALC 108 (H) 12/22/2020     Lab Results   Component Value Date    TRIG 99 07/07/2023    TRIG 107 06/16/2022    TRIG 74 12/22/2020     No results found for: "CHOLHDL"  [unfilled]  Lab Results   Component Value Date    TSH 2.123 07/14/2024     No results found for: "INR", "PROTIME"  Lab Results   Component Value Date    WBC 5.76 07/16/2024    HGB 10.9 (L) 07/16/2024    HCT 33.0 (L) 07/16/2024    MCV 97 " 07/16/2024     07/16/2024     BNP  Recent Labs   Lab 07/15/24  1437   BNP 2,727*          Assessment:      1. SOB (shortness of breath)    2. Acute on chronic congestive heart failure, unspecified heart failure type    3. Hypoxia    4. CHF (congestive heart failure)    5. Bradycardia    6. Elevated troponin        Plan:     Acute on Chronic CHF  -Continue IV diuresis  -Continue ARB, Hydralazine  -Dash diet 2 gm sodium restriction  -Strict I/os  -Daily weights    2. 2:1 AV Block, LBBB/RBBB  -Would benefit from CRT-P once CHF more controlled  -Continue IV diuresis  -Agreeable to proceed with CRT P once ready  -Avoid AV giacomo agents given bradycardia    Nicole May, FNP-C [facility]

## 2024-07-17 NOTE — PT/OT/SLP EVAL
Occupational Therapy   Evaluation    Name: Kaleigh Delgado  MRN: [id number]  Admitting Diagnosis: CHF (congestive heart failure)  Recent Surgery: * No surgery found *      Recommendations:     Discharge Recommendations: Moderate Intensity Therapy  Discharge Equipment Recommendations:  to be determined by next level of care  Barriers to discharge:  Decreased caregiver support    Assessment:     Kaleigh Delgado is a 74 y.o. female with a medical diagnosis of CHF (congestive heart failure).  She presents with the following performance deficits affecting function: weakness, impaired endurance, impaired sensation, impaired self care skills, impaired functional mobility, gait instability, impaired balance, decreased coordination, decreased safety awareness, edema, impaired cardiopulmonary response to activity.      Rehab Prognosis: Good; patient would benefit from acute skilled OT services to address these deficits and reach maximum level of function.       Plan:     Patient to be seen 2 x/week to address the above listed problems via therapeutic activities, therapeutic exercises, self-care/home management  Plan of Care Expires: 07/31/24  Plan of Care Reviewed with: patient    Subjective     Chief Complaint: SOB and fatigue with exertion  Patient/Family Comments/goals: improve strength and mobility    Occupational Profile:  Living Environment: lives alone in a 1 story house with small ramp to enter. Walk-in shower. Pt reports  has been in the hospital/in a rehab facility since December 2023. Pt has friends that live nearby that can check in on her.  Previous level of function: Pt (I) with ADLs. Mod (I) with functional mobility using RW household distances and rollator when out in the community.   Roles and Routines: drives  Equipment Used at Home: walker, rolling, rollator, grab bar, other (see comments) (built-in shower seat)  Assistance upon Discharge: friends    Pain/Comfort:  Pain Rating 1: 0/10    Objective:  "    Communicated with: Nurse and epic chart review prior to session.  Patient found sitting edge of bed with peripheral IV, telemetry upon OT entry to room.    General Precautions: Standard, fall  Orthopedic Precautions: N/A  Braces: N/A  Respiratory Status: Room air    Functional Mobility/Transfers:  Patient completed Sit <> Stand Transfer with contact guard assistance  with  rolling walker   Patient completed Bed <> Chair Transfer using Stand Pivot technique with contact guard assistance with rolling walker  Patient completed Toilet Transfer Stand Pivot technique with contact guard assistance with  rolling walker  Functional Mobility: Patient completed x40ft x2 functional mobility with CGA and RW to increase dynamic standing balance and activity tolerance needed for ADL completion. Required multiple short standing rest breaks d/t SOB and fatigue.  Pt also performed 8ft x2 ambulation EOB>toilet>sink with CGA and RW.     Activities of Daily Living:  Grooming: contact guard assistance wash hands while standing at sink, weightbearing through UEs on sink countertop for support.  Lower Body Dressing: maximal assistance doff/yeimy brief in standing  Toileting: stand by assistance using bathroom commode    Cognitive/Visual Perceptual:  Cognitive/Psychosocial Skills:     -       Oriented to: Person, Place, Time, and Situation   -       Follows Commands/attention:Follows multistep  commands  -       Communication: clear/fluent  -       Memory: No Deficits noted  -       Safety awareness/insight to disability: impaired     Physical Exam:  Sensation:    -       Impaired  pt reports numbness in B feet that is "on and off"  Dominant hand:    -       left  Upper Extremity Range of Motion:     -       Right Upper Extremity: WFL  -       Left Upper Extremity: WFL  Upper Extremity Strength:    -       Right Upper Extremity: 4-/5 shoulder, 4/5 elbow  -       Left Upper Extremity: 4-/5 shoulder, 4/5 elbow   Strength:    -       " Right Upper Extremity: WFL  -       Left Upper Extremity: WFL    [facility] 6 Click ADL:  AMPAC Total Score: 17    Treatment & Education:  Educated pt on pursed lip breathing technique and importance of activity pacing for SOB. Patient educated on role of OT in acute setting and benefits of participation. Educated on techniques to use to increase independence and decrease fall risk with functional transfers. Educated on importance of OOB activity and calling for A to transfer back to bed. Encouraged completion of B UE AROM therex throughout the day to tolerance to increase functional strength and activity tolerance. Educated patient on importance of increased tolerance to upright position and direct impact on CV endurance and strength. Patient encouraged to sit up in chair for a minimum of 2 consecutive hours per day. Patient stated understanding and in agreement with POC.     Patient left up in chair with all lines intact, call button in reach, chair alarm on, and staff member present    GOALS:   Multidisciplinary Problems       Occupational Therapy Goals          Problem: Occupational Therapy    Goal Priority Disciplines Outcome Interventions   Occupational Therapy Goal     OT, PT/OT     Description: Goals to be met by: 7/31/24     Patient will increase functional independence with ADLs by performing:    UE Dressing with Modified Coleman.  Grooming while standing at sink with Modified Coleman.  Toileting from toilet with Modified Coleman for hygiene and clothing management.   Toilet transfer to toilet with Modified Coleman with RW.  Upper extremity exercise program x15 reps per handout, with independence.                         History:     History reviewed. No pertinent past medical history.      Past Surgical History:   Procedure Laterality Date    exploratory lap      FIRST RIB REMOVAL      KNEE SURGERY      TONSILLECTOMY      VARICOSE VEIN SURGERY         Time Tracking:     OT Date of  Treatment: 07/17/24  OT Start Time: 0925  OT Stop Time: 0950  OT Total Time (min): 25 min    Billable Minutes:Evaluation 15  Therapeutic Activity 10    7/17/2024  Jaylene Smith OT

## 2024-07-17 NOTE — PLAN OF CARE
PT EVAL complete. Required CGA for bed mobility, ambulated 80ft CGA using RW. Recommending moderate intensity therapy upon d/c.

## 2024-07-17 NOTE — ASSESSMENT & PLAN NOTE
Chronic, uncontrolled. Latest blood pressure and vitals reviewed-     Temp:  [98.1 °F (36.7 °C)-98.6 °F (37 °C)]   Pulse:  [41-57]   Resp:  [18]   BP: (139-203)/(64-79)   SpO2:  [92 %-96 %] .   Home meds for hypertension were reviewed and noted below.   Hypertension Medications               hydroCHLOROthiazide (MICROZIDE) 12.5 mg capsule Take 1 capsule by mouth every morning.     While in the hospital, will manage blood pressure as follows; Continue home antihypertensive regimen    Will utilize p.r.n. blood pressure medication only if patient's blood pressure greater than 160/100 and she develops symptoms such as worsening chest pain or shortness of breath.

## 2024-07-18 LAB
ANION GAP SERPL CALC-SCNC: 10 MMOL/L (ref 8–16)
BNP SERPL-MCNC: 1073 PG/ML (ref 0–99)
BUN SERPL-MCNC: 34 MG/DL (ref 8–23)
CALCIUM SERPL-MCNC: 8.8 MG/DL (ref 8.7–10.5)
CHLORIDE SERPL-SCNC: 108 MMOL/L (ref 95–110)
CO2 SERPL-SCNC: 24 MMOL/L (ref 23–29)
CREAT SERPL-MCNC: 1 MG/DL (ref 0.5–1.4)
EST. GFR  (NO RACE VARIABLE): 59 ML/MIN/1.73 M^2
GLUCOSE SERPL-MCNC: 101 MG/DL (ref 70–110)
POTASSIUM SERPL-SCNC: 3.7 MMOL/L (ref 3.5–5.1)
SODIUM SERPL-SCNC: 142 MMOL/L (ref 136–145)

## 2024-07-18 PROCEDURE — 11000001 HC ACUTE MED/SURG PRIVATE ROOM

## 2024-07-18 PROCEDURE — 83880 ASSAY OF NATRIURETIC PEPTIDE: CPT

## 2024-07-18 PROCEDURE — 99232 SBSQ HOSP IP/OBS MODERATE 35: CPT | Mod: ,,, | Performed by: PHYSICIAN ASSISTANT

## 2024-07-18 PROCEDURE — 63600175 PHARM REV CODE 636 W HCPCS

## 2024-07-18 PROCEDURE — 97116 GAIT TRAINING THERAPY: CPT | Mod: CQ

## 2024-07-18 PROCEDURE — 25000003 PHARM REV CODE 250

## 2024-07-18 PROCEDURE — 25000003 PHARM REV CODE 250: Performed by: INTERNAL MEDICINE

## 2024-07-18 PROCEDURE — 97530 THERAPEUTIC ACTIVITIES: CPT | Mod: CQ

## 2024-07-18 PROCEDURE — 97535 SELF CARE MNGMENT TRAINING: CPT

## 2024-07-18 PROCEDURE — 36415 COLL VENOUS BLD VENIPUNCTURE: CPT | Performed by: HOSPITALIST

## 2024-07-18 PROCEDURE — 80048 BASIC METABOLIC PNL TOTAL CA: CPT | Performed by: HOSPITALIST

## 2024-07-18 PROCEDURE — 63600175 PHARM REV CODE 636 W HCPCS: Performed by: HOSPITALIST

## 2024-07-18 PROCEDURE — 97530 THERAPEUTIC ACTIVITIES: CPT

## 2024-07-18 RX ADMIN — AMLODIPINE BESYLATE 5 MG: 5 TABLET ORAL at 08:07

## 2024-07-18 RX ADMIN — HYDRALAZINE HYDROCHLORIDE 10 MG: 10 TABLET, FILM COATED ORAL at 02:07

## 2024-07-18 RX ADMIN — FUROSEMIDE 40 MG: 10 INJECTION, SOLUTION INTRAMUSCULAR; INTRAVENOUS at 08:07

## 2024-07-18 RX ADMIN — LOSARTAN POTASSIUM 50 MG: 50 TABLET, FILM COATED ORAL at 08:07

## 2024-07-18 RX ADMIN — HYDRALAZINE HYDROCHLORIDE 10 MG: 10 TABLET, FILM COATED ORAL at 10:07

## 2024-07-18 RX ADMIN — ENOXAPARIN SODIUM 40 MG: 40 INJECTION SUBCUTANEOUS at 04:07

## 2024-07-18 RX ADMIN — HYDRALAZINE HYDROCHLORIDE 10 MG: 10 TABLET, FILM COATED ORAL at 06:07

## 2024-07-18 RX ADMIN — HYDRALAZINE HYDROCHLORIDE 10 MG: 20 INJECTION, SOLUTION INTRAMUSCULAR; INTRAVENOUS at 12:07

## 2024-07-18 NOTE — ASSESSMENT & PLAN NOTE
-HR 40s with PVCs, asymptomatic  -Cardiology following   -Repeat BNP pending  -Echo yesterday EF 55%  -planned nuc stress test in a.m.   -Avoid AV giacomo blocking agents  -Cardiology consulted Electrophysiology Ayde Dubon NP plan for CRT Pacemaker once CHF is controlled

## 2024-07-18 NOTE — NURSING
Pt remains free of falls/injury. Safety precautions maintained. Pt reports chronic pain to right lower back. IV abx given.  Pt tolerated sitting in chair for most of the day.  Cardiac diet tolerated. VSS. No S/S of distress noted at this time. Bed alarm set.12 hour chart check complete. Will continue to monitor.

## 2024-07-18 NOTE — PROGRESS NOTES
[facility]  Cardiology  Progress Note    Patient Name: Kaleigh Delgado  MRN: [id number]  Admission Date: 7/13/2024  Hospital Length of Stay: 5 days  Code Status: Full Code   Attending Physician: Alina Steele MD   Primary Care Physician: Aldair Kaur MD  Expected Discharge Date:   Principal Problem:CHF (congestive heart failure)    Subjective:     Hospital Course:   Patient is a 74 year old female with a past medical history of hypertension, history of CHF, followed in the past by [facility], apparent history of afib, but now in sinus rhythm. BNP greater than 2000. Chest x-ray shows CHF. EKG shows sinus bradycardia with PAC's. There is a mild increase in troponin 0.09. Patient states history of heart cath by [facility] in 2021 that was treated medically.    Recommend continue Iv diuresis, check echocardiogram, and blood pressure control. Patient will eventually need nuclear stress test. We will need to obtain cath report from [facility].      History reviewed. No pertinent past medical history.  7/15/24 pt seen and examined today, resting in bed. On supplemental O2 via NC. Denies any CP at this time. C/o aches and fatigue, tele reviewed HR 40s on monitor. Labs reviewed, -2.3L for admission, troponin 0.309, Crt 1.0. Received 4 doses of lasix since admission not on any scheduled currently. Previously followed by [facility] had LHC done in 21', she is unsure of results. will get records. Nuc stress planned for tomorrow    7/16/24 Pt seen and examined today, does not feel well. Has lots of outside stressors,  is sick. Planned on nuc stress today EKG with intermittent 2:1 AVB, pt feeling anxious will reschedule once diuresed. BNP yesterday 2727, needs diuresis.     7/17/24 pt seen and examined today, doing better off oxygen during exam sitting up in chair. Diuresing well. Labs reviewed, chart reviewed. No acute CV events reported. Tele with occ 2:1AVB will get EP consult    7/18/24-Patient seen and examined today, sitting  up in bedside chair. Feeling better, less SOB. Continues to diurese well, BNP trending down. No CP. HR stable during exam. EP on board, CRT-P planned once euvolemic. Creatinine stable.             Review of Systems   Constitutional: Positive for malaise/fatigue.   HENT: Negative.     Eyes: Negative.    Cardiovascular:  Positive for dyspnea on exertion (improved).   Respiratory:  Positive for shortness of breath.    Endocrine: Negative.    Hematologic/Lymphatic: Negative.    Skin: Negative.    Musculoskeletal: Negative.    Gastrointestinal: Negative.    Genitourinary: Negative.    Neurological: Negative.    Psychiatric/Behavioral: Negative.     Allergic/Immunologic: Negative.      Objective:     Vital Signs (Most Recent):  Temp: 97.6 °F (36.4 °C) (07/18/24 1148)  Pulse: (!) 45 (07/18/24 1252)  Resp: 18 (07/18/24 1148)  BP: (!) 156/61 (07/18/24 1252)  SpO2: 95 % (07/18/24 1148) Vital Signs (24h Range):  Temp:  [97.6 °F (36.4 °C)-99 °F (37.2 °C)] 97.6 °F (36.4 °C)  Pulse:  [42-97] 45  Resp:  [18-20] 18  SpO2:  [92 %-98 %] 95 %  BP: (125-185)/(56-72) 156/61     Weight: 63.2 kg (139 lb 5.3 oz)  Body mass index is 19.99 kg/m².     SpO2: 95 %         Intake/Output Summary (Last 24 hours) at 7/18/2024 1303  Last data filed at 7/18/2024 0600  Gross per 24 hour   Intake 490 ml   Output 700 ml   Net -210 ml       Lines/Drains/Airways       Drain  Duration             Female External Urinary Catheter w/ Suction 07/17/24 1 day              Peripheral Intravenous Line  Duration                  Peripheral IV - Single Lumen 07/13/24 1800 18 G 1 1/4 in Anterior;Proximal;Right Forearm 4 days         Peripheral IV - Single Lumen 07/13/24 1805 20 G 1 1/4 in Left Antecubital 4 days                       Physical Exam  Vitals and nursing note reviewed.   Constitutional:       General: She is not in acute distress.     Appearance: Normal appearance. She is well-developed. She is not diaphoretic.   HENT:      Head: Normocephalic and  "atraumatic.   Eyes:      General:         Right eye: No discharge.         Left eye: No discharge.      Pupils: Pupils are equal, round, and reactive to light.   Cardiovascular:      Rate and Rhythm: Regular rhythm. Bradycardia present.      Heart sounds: Normal heart sounds, S1 normal and S2 normal. No murmur heard.  Pulmonary:      Effort: Pulmonary effort is normal.      Breath sounds: Normal breath sounds.   Abdominal:      General: There is no distension.   Musculoskeletal:      Right lower leg: No edema.      Left lower leg: No edema.   Skin:     General: Skin is warm and dry.      Findings: No erythema.   Neurological:      Mental Status: She is alert and oriented to person, place, and time.   Psychiatric:         Mood and Affect: Mood normal.         Behavior: Behavior normal.            Significant Labs: CMP   Recent Labs   Lab 07/17/24  0434 07/18/24  0333    142   K 4.0 3.7    108   CO2 21* 24    101   BUN 33* 34*   CREATININE 1.0 1.0   CALCIUM 9.2 8.8   ANIONGAP 14 10   , CBC No results for input(s): "WBC", "HGB", "HCT", "PLT" in the last 48 hours., Troponin No results for input(s): "TROPONINI" in the last 48 hours., and All pertinent lab results from the last 24 hours have been reviewed.    Significant Imaging: Echocardiogram: Transthoracic echo (TTE) complete ([facility] Only):   Results for orders placed or performed during the hospital encounter of 07/13/24   Echo   Result Value Ref Range    BSA 1.86 m2    LVIDd 4.20 3.5 - 6.0 cm    LV Systolic Volume 47.52 mL    LV Systolic Volume Index 25.4 mL/m2    LVIDs 3.40 2.1 - 4.0 cm    LV Diastolic Volume 78.48 mL    LV Diastolic Volume Index 41.97 mL/m2    Left Ventricular End Systolic Volume by Teichholz Method 47.52 mL    Left Ventricular End Diastolic Volume by Teichholz Method 78.48 mL    IVS 1.53 (A) 0.6 - 1.1 cm    LVOT diameter 2.07 cm    LVOT area 3.4 cm2    FS 19 (A) 28 - 44 %    Left Ventricle Relative Wall Thickness 0.70 cm    " Posterior Wall 1.47 (A) 0.6 - 1.1 cm    LV mass 249.50 g    LV Mass Index 133 g/m2    MV Peak E Bobby 1.00 m/s    TDI LATERAL 0.09 m/s    TDI SEPTAL 0.11 m/s    E/E' ratio 10.00 m/s    MV Peak A Bobby 0.83 m/s    TR Max Bobby 3.24 m/s    E/A ratio 1.20     IVRT 87.54 msec    E wave deceleration time 256.49 msec    LV SEPTAL E/E' RATIO 9.09 m/s    LV LATERAL E/E' RATIO 11.11 m/s    LA size 4.89 cm    Left Atrium Minor Axis 6.92 cm    Left Atrium Major Axis 7.90 cm    RA Major Axis 6.58 cm    AV regurgitation pressure 1/2 time 484.892064998309062 ms    AR Max Bobby 4.16 m/s    AV mean gradient 11 mmHg    AV peak gradient 20 mmHg    Ao peak bobby 2.25 m/s    Ao VTI 59.70 cm    MV stenosis pressure 1/2 time 74.38 ms    MV valve area p 1/2 method 2.96 cm2    Triscuspid Valve Regurgitation Peak Gradient 42 mmHg    PV PEAK VELOCITY 1.41 m/s    PV peak gradient 8 mmHg    Pulmonary Valve Mean Velocity 0.91 m/s    STJ 3.00 cm    IVC diameter 1.08 cm    Mean e' 0.10 m/s    ZLVIDS 0.44     ZLVIDD -2.16     EF 55 %    TV resting pulmonary artery pressure 45 mmHg    RV TB RVSP 6 mmHg    Est. RA pres 3 mmHg    Narrative      Left Ventricle: The left ventricle is normal in size. Normal wall   thickness. There is concentric hypertrophy. There is normal systolic   function. Ejection fraction by visual approximation is 55%.    Right Ventricle: Normal right ventricular cavity size. Wall thickness   is normal. Systolic function is normal.    Aortic Valve: There is mild aortic regurgitation with a centrally   directed jet.    Pulmonary Artery: The estimated pulmonary artery systolic pressure is   45 mmHg.    IVC/SVC: Normal venous pressure at 3 mmHg.     , EKG: Reviewed, and X-Ray: CXR: X-Ray Chest 1 View (CXR):   Results for orders placed or performed during the hospital encounter of 07/13/24   X-Ray Chest 1 View    Narrative    EXAMINATION:  XR CHEST 1 VIEW    CLINICAL HISTORY:  shortness of breath;    TECHNIQUE:  Single frontal portable view of  the chest was performed.    COMPARISON:  Yesterday    FINDINGS:  Mediastinum rotated.  Pulmonary interstitial markings diminishing.  Minimal blunting of the costophrenic angles redemonstrated similar.      Impression    Diminishing vascular congestion      Electronically signed by: Sherie Carty  Date:    07/14/2024  Time:    17:30    and X-Ray Chest PA and Lateral (CXR): No results found for this visit on 07/13/24.  Assessment and Plan:   Patient who presents with acute CHF as well as intermittent 2:1 AV block/dissociation. Improving with IV diuresis. Continue same mgmt. CRT-P planned once more euvolemic, appreciate EP.    * CHF (congestive heart failure)  BNP 2367, received IV lasix x4. On nasal cannula  GDMT ARB, not on BB given bradycardia  Kidney function stable  Repeat BNP pending  Echo yesterday EF 55%  Nuc stress tomorrow  F/u in [facility]    7/16/24  Hold off on nuc stress today, diurese  BNP elevated  IV diuresis cont ARB  Strict I/Os monitor renal function    7/17/24  Cont IV diuresis, ARB    7/18/24  -Improving, BNP trending down  -Continue IV diuresis for additional day  -Continue ARB    Elevated troponin  -OP MPI stress test recommended    Bradycardia  HR 40s with PVCs, asymptomatic  Avoid AV giacomo blocking agents  F/u with primary cardiologist to monitor  Home med list with only HCTZ    7/16/24  EKG during stress today with int 2:1 AVB    7/17/24  EP consulted given int 2:1AVB, AV disassociation   Appreciate recs    7/18/24  -EP on board, CRT-P planned once more euvolemic    Acute hypoxemic respiratory failure  -Secondary to CHF, improved with IV diureiss     Hypertension  -Titrate medications, cont ARB    Shortness of breath  Improving since admission  BNP pending  Wean O2 as tolerated    7/16/24  BNP still elevated  Diurese    7/18/24  -Improving, continue IV Lasix        VTE Risk Mitigation (From admission, onward)           Ordered     enoxaparin injection 40 mg  Daily         07/13/24 9007      IP VTE HIGH RISK PATIENT  Once         07/13/24 2334     Place sequential compression device  Until discontinued         07/13/24 2334                    Shanti Lake PA-C  Cardiology  [facility]

## 2024-07-18 NOTE — ASSESSMENT & PLAN NOTE
BNP 2367, received IV lasix x4. On nasal cannula  GDMT ARB, not on BB given bradycardia  Kidney function stable  Repeat BNP pending  Echo yesterday EF 55%  Nuc stress tomorrow  F/u in [facility]    7/16/24  Hold off on nuc stress today, diurese  BNP elevated  IV diuresis cont ARB  Strict I/Os monitor renal function    7/17/24  Cont IV diuresis, ARB    7/18/24  -Improving, BNP trending down  -Continue IV diuresis for additional day  -Continue ARB

## 2024-07-18 NOTE — ASSESSMENT & PLAN NOTE
Patient is identified as having  evidence of new onset  heart failure that is Acute. CHF is currently uncontrolled due to Continued edema of extremities, Dyspnea not returned to baseline after single doses of IV diuretic, >3 pillow orthopnea, Rales/crackles on pulmonary exam, and Pulmonary edema/pleural effusion on CXR. Latest ECHO performed and demonstrates 55% EF  Continue Furosemide and monitor clinical status closely. Monitor on telemetry. Patient is on CHF pathway.  Monitor strict Is&Os and daily weights.  Place on fluid restriction of 1.5 L. Cardiology has been consulted. Continue to stress to patient importance of self efficacy and  on diet for CHF. Last BNP reviewed- and noted below   Recent Labs   Lab 07/18/24  0333   BNP 1,073*     -Cardiology consulted   -ECHO: Ejection fraction 55%   -Planned Nuc stress test cancelled per cardiology   -IV lasix 40mg q12h   -Wean O2 as tolerated   -pt/ot to eval and treat - recommend SNF, patient declined stating she would be agreeable to Home health  -Cardiology consulted Electrophysiology Ayde Dubon NP plan for CRT Pacemaker once CHF is controlled

## 2024-07-18 NOTE — CHAPLAIN
Initial visit with patient.  Visited with patient to assess for spiritual and emotional needs.  Pt shared that she has been going through a lot.  She mentioned that her  has been through a lot with his health and she has been taking care of him.  This has made it to were she has not been able to take care of her own self leading her to being here.  She talked about multiple other concerns that she has within her life and currently had no other needs.  Spiritual care remains available as needed.    Chaplain Ric Orta M.Div., [facility]

## 2024-07-18 NOTE — ASSESSMENT & PLAN NOTE
HR 40s with PVCs, asymptomatic  Avoid AV giacomo blocking agents  F/u with primary cardiologist to monitor  Home med list with only HCTZ    7/16/24  EKG during stress today with int 2:1 AVB    7/17/24  EP consulted given int 2:1AVB, AV disassociation   Appreciate recs    7/18/24  -EP on board, CRT-P planned once more euvolemic

## 2024-07-18 NOTE — PT/OT/SLP PROGRESS
Physical Therapy  Treatment    Kaleigh Delgdao   MRN: [id number]   Admitting Diagnosis: CHF (congestive heart failure)    PT Received On: 07/18/24  PT Start Time: 0800     PT Stop Time: 0830    PT Total Time (min): 30 min       Billable Minutes:  Gait Training 15 and Therapeutic Activity 15    Treatment Type: Treatment  PT/PTA: PTA     Number of PTA visits since last PT visit: 1       General Precautions: Standard, fall  Orthopedic Precautions: N/A  Braces: N/A  Respiratory Status: Room air    Spiritual, Cultural Beliefs, Hinduism Practices, Values that Affect Care: no    Subjective:  Communicated with patient's nurse, Kavita, and completed Epic chart review prior to session.  Patient agreed to PT session.     Pain/Comfort  Pain Rating 1: 0/10  Pain Rating Post-Intervention 1: 0/10    Objective:   Patient found with: peripheral IV, telemetry    Supine > sit EOB: SBA    Forward scoot towards EOB: SBA    STS from EOB > RW: CGA (VC for hand placement)    120ft w/ RW CGA (multiple short interval standing rest breaks due to fatigue)    Stand pivot T/F to toilet w/ RW: CGA (VC for safety w/ RW mgmt)    STS from toilet > RW: CGA (VC for hand placement)    8ft from toilet > sink w/ RW: CGA    Stand at sink x8 min total completing self care ADL tasks with single UE support on counter top.    Stand pivot T/F to chair w/ RW: CGA (VC for safety w/ RW mgmt)    Reviewed AROM TE to BLE including: hip flex/ext, knee flex/ext, ankle PF/DF  To be completed a minimum of 10 reps for each LE in order to promote return of function, strength and ROM.      Educated patient on importance of increased tolerance to upright position and direct impact on CV endurance and strength. Patient encouraged to sit up in chair/ EOB, for a minimum of 2 consecutive hours, 3x per day. Encouraged patient to perform AROM TE to BLE throughout the day within all available planes of motion. Re enforced importance of utilizing call light to meet needs in room and  not attempt to get up without staff assistance. Patient verbalized understanding and agreed to comply.       AM-PAC 6 CLICK MOBILITY  How much help from another person does this patient currently need?   1 = Unable, Total/Dependent Assistance  2 = A lot, Maximum/Moderate Assistance  3 = A little, Minimum/Contact Guard/Supervision  4 = None, Modified [place]/Independent    Turning over in bed (including adjusting bedclothes, sheets and blankets)?: 3  Sitting down on and standing up from a chair with arms (e.g., wheelchair, bedside commode, etc.): 3  Moving from lying on back to sitting on the side of the bed?: 3  Moving to and from a bed to a chair (including a wheelchair)?: 3  Need to walk in hospital room?: 3  Climbing 3-5 steps with a railing?: 1 (NT)  Basic Mobility Total Score: 16    AM-PAC Raw Score CMS G-Code Modifier Level of Impairment Assistance   6 % Total / Unable   7 - 9 CM 80 - 100% Maximal Assist   10 - 14 CL 60 - 80% Moderate Assist   15 - 19 CK 40 - 60% Moderate Assist   20 - 22 CJ 20 - 40% Minimal Assist   23 CI 1-20% SBA / CGA   24 CH 0% Independent/ Mod I     Patient left up in chair with call button in reach and chair alarm on.    Assessment:  Kaleigh Delgado is a 74 y.o. female with a medical diagnosis of CHF (congestive heart failure) and presents with overall decline in functional mobility. Patient would continue to benefit from skilled PT to address functional limitations listed below in order to return to PLOF/decrease caregiver burden.     Rehab identified problem list/impairments: weakness, impaired endurance, impaired functional mobility, impaired balance, gait instability, decreased lower extremity function, decreased safety awareness, impaired cardiopulmonary response to activity    Rehab potential is good.    Activity tolerance: Fair    Discharge recommendations: Moderate Intensity Therapy      Barriers to discharge:      Equipment recommendations: to be determined by next  level of care     GOALS:   Multidisciplinary Problems       Physical Therapy Goals          Problem: Physical Therapy    Goal Priority Disciplines Outcome Goal Variances Interventions   Physical Therapy Goal     PT, PT/OT      Description: Goals to be met by 7/31/24.  1. Pt will complete bed mobility SBA.  2. Pt will complete sit to stand SBA.  3. Pt will ambulate 200ft SBA using RW.  4. Pt will increase AMPAC score by 2 points to progress functional mobility.                       PLAN:    Patient to be seen 3 x/week to address the above listed problems via gait training, therapeutic activities, therapeutic exercises  Plan of Care expires: 07/31/24  Plan of Care reviewed with: patient         07/18/2024

## 2024-07-18 NOTE — PROGRESS NOTES
[facility]  Hospital Medicine  Progress Note    Patient Name: Kaleigh Delgado  MRN: [id number]  Patient Class: IP- Inpatient   Admission Date: 7/13/2024  Length of Stay: 5 days  Attending Physician: Alina Steele MD  Primary Care Provider: Aldair Kaur MD        Subjective:     Principal Problem:CHF (congestive heart failure)        HPI:  Kaleigh Delgado is a 74 y.o. female with a PMH  has no past medical history on file. who presented to the ED for further evaluation of worsening dyspnea/shortness of breath and bilateral leg swelling x2 days duration.  Patient denies prior history of CHF and is not currently on home O2 or diuretics.  Given worsening symptoms, patient called EMS and was found to be hypoxic with O2 saturation 88% on room air and was initiated on supplemental oxygen at 6 L via nasal cannula but was titrated up to 15 L non-rebreather.  Patient reported no known alleviating factors, and aggravating factors including laying flat and with exertion.  She denied endorsing any lightheadedness, dizziness, headache, visual changes, fever, chills, sweats, nausea, vomiting, chest pain, abdominal pain, dysuria, hematuria, melena, hematochezia, diarrhea, or onset neurological deficits.  Prior to onset of symptoms, patient reported being in her usual state of health with no other concerns or complaints.  All other review of systems negative except as noted above.  Initial workup in the ED revealed patient to be tachypneic and hypoxic with elevated D-dimer measuring 1.30.  BNP 03/20/2067, troponin 0.090, flu/COVID negative, UA negative for UTI. CTA positive for mild to moderate pulmonary edema, mild left pleural effusion, small right pleural effusion, but negative for pulmonary embolus.  Patient admitted to Hospital Medicine inpatient for continued medical management and workup of new onset heart failure.    PCP: No, Primary Doctor      Overview/Hospital Course:  74 y.o. female with no past medical  history on file admitted for new onset of CHF. Patient denies history of CHF or home oxygen use. Patient is currently for Hctz 12.5 mg daily for blood pressure management. Ed work up revealed elevated D-dimer measuring 1.30. BNP 2367, troponin 0.090, flu/COVID negative, UA negative for UT  CTA positive for mild to moderate pulmonary edema, mild left pleural effusion, small right pleural effusion, but negative for pulmonary embolus. CXR showed cardiomegaly with perihilar edema. Correlate clinically to CHF. Trace pleural effusions. Correlate clinically to CHF. Echo resulted with a 55% EF. Cardiology following.    Patient bradycardic, STAT EKG. Elevated BP. PRN IV hydralazine ordered. Reported increased SOB after hydralazine administered. Gave lasix 20 mg IV once, then 40 mg IV x2 doses. Started losartan 50 mg for BP control. STAT CXR and supplemental oxygen PRN.    Trend troponin, 0.090> 0.309 >0.203   Cardiology consulted  Nuclear stress test- cancelled per cardiology due to AV block , LBBB/RBBB   PT/OT to eval and treat- evaluated and reported patient would benefit from SNF. SW saw patient and declined, stating she would be agreeable to Home health. Patient reports she has family members who can check on her frequently after d/c and states she would prefer home health   IV lasix 40mg q12h  Cardiology consulted Electrophysiology Ayde Dubon NP plan for CRT Pacemaker once CHF is controlled   BNP down trending, now 1073     Interval History: f/u SOB. Patient awake and alert. NAD. Patient up and sitting in bedside chair. Reports improvement in SOB, reports she becomes mildly SOB with exertion. Able to maintain normal oxygen saturation on room air. Patient reports improvement in right sided CP, continue IV diuresis. Cardiology following. Pt/Ot to continue to eval and treat.     Review of Systems  Objective:     Vital Signs (Most Recent):  Temp: 99 °F (37.2 °C) (07/18/24 0838)  Pulse: (!) 45 (07/18/24 1016)  Resp: 18  (07/18/24 0838)  BP: (!) 125/56 (07/18/24 1016)  SpO2: 95 % (07/18/24 0838) Vital Signs (24h Range):  Temp:  [98 °F (36.7 °C)-99 °F (37.2 °C)] 99 °F (37.2 °C)  Pulse:  [42-97] 45  Resp:  [18-20] 18  SpO2:  [92 %-98 %] 95 %  BP: (125-185)/(56-72) 125/56     Weight: 63.2 kg (139 lb 5.3 oz)  Body mass index is 19.99 kg/m².    Intake/Output Summary (Last 24 hours) at 7/18/2024 1049  Last data filed at 7/18/2024 0600  Gross per 24 hour   Intake 490 ml   Output 700 ml   Net -210 ml         Physical Exam  Vitals and nursing note reviewed.   Constitutional:       General: She is not in acute distress.     Appearance: She is not ill-appearing.   HENT:      Mouth/Throat:      Mouth: Mucous membranes are moist.      Pharynx: Oropharynx is clear.   Eyes:      Pupils: Pupils are equal, round, and reactive to light.   Cardiovascular:      Rate and Rhythm: Regular rhythm. Bradycardia present.   Pulmonary:      Effort: Pulmonary effort is normal.      Breath sounds: Normal breath sounds. No wheezing.   Abdominal:      General: Bowel sounds are normal. There is no distension.      Palpations: Abdomen is soft.      Tenderness: There is no abdominal tenderness.   Musculoskeletal:         General: Normal range of motion.   Skin:     General: Skin is warm and dry.      Comments: BLE chronic venous stasis    Neurological:      General: No focal deficit present.      Mental Status: She is alert and oriented to person, place, and time.             Significant Labs: All pertinent labs within the past 24 hours have been reviewed.  Recent Lab Results         07/18/24  0333        Anion Gap 10       BNP 1,073  Comment: Values of less than 100 pg/ml are consistent with non-CHF populations.       BUN 34       Calcium 8.8       Chloride 108       CO2 24       Creatinine 1.0       eGFR 59       Glucose 101       Potassium 3.7       Sodium 142               Significant Imaging: I have reviewed all pertinent imaging results/findings within the past  24 hours.    Assessment/Plan:      * CHF (congestive heart failure)  Patient is identified as having  evidence of new onset  heart failure that is Acute. CHF is currently uncontrolled due to Continued edema of extremities, Dyspnea not returned to baseline after single doses of IV diuretic, >3 pillow orthopnea, Rales/crackles on pulmonary exam, and Pulmonary edema/pleural effusion on CXR. Latest ECHO performed and demonstrates 55% EF  Continue Furosemide and monitor clinical status closely. Monitor on telemetry. Patient is on CHF pathway.  Monitor strict Is&Os and daily weights.  Place on fluid restriction of 1.5 L. Cardiology has been consulted. Continue to stress to patient importance of self efficacy and  on diet for CHF. Last BNP reviewed- and noted below   Recent Labs   Lab 07/18/24  0333   BNP 1,073*     -Cardiology consulted   -ECHO: Ejection fraction 55%   -Planned Nuc stress test cancelled per cardiology   -IV lasix 40mg q12h   -Wean O2 as tolerated   -pt/ot to eval and treat - recommend SNF, patient declined stating she would be agreeable to Home health  -Cardiology consulted Electrophysiology Ayde Dubno NP plan for CRT Pacemaker once CHF is controlled     Elevated troponin  Cardiology consulted   Trop 0.090>0.309> 0.203   IV diuresis   BNP down trending to 1073      Bradycardia  -HR 40s with PVCs, asymptomatic  -Cardiology following   -Repeat BNP pending  -Echo yesterday EF 55%  -planned nuc stress test in a.m.   -Avoid AV giacomo blocking agents  -Cardiology consulted Electrophysiology Ayde Dubon NP plan for CRT Pacemaker once CHF is controlled     Acute hypoxemic respiratory failure  Patient with Hypoxic Respiratory failure which is Acute.  she is not on home oxygen. Supplemental oxygen was provided and noted-      Signs/symptoms of respiratory failure include- tachypnea, increased work of breathing, and respiratory distress. Contributing diagnoses includes - CHF, Pleural effusion, Pneumonia, and  Pulmonary Embolus Labs and images were reviewed. Patient Has not had a recent ABG. Will treat underlying causes and adjust management of respiratory failure as follows:  Plan:  -Continue treatment and workup of CHF  -Titrate oxygen requirements as needed  -Incentive spirometry   -Monitor pulse oximetry  -Nohemy prn      Hypertension  Chronic, uncontrolled. Latest blood pressure and vitals reviewed-     Temp:  [98 °F (36.7 °C)-99 °F (37.2 °C)]   Pulse:  [42-97]   Resp:  [18-20]   BP: (125-185)/(56-72)   SpO2:  [92 %-98 %] .   Home meds for hypertension were reviewed and noted below.   Hypertension Medications               hydroCHLOROthiazide (MICROZIDE) 12.5 mg capsule Take 1 capsule by mouth every morning.     While in the hospital, will manage blood pressure as follows; Continue home antihypertensive regimen    Will utilize p.r.n. blood pressure medication only if patient's blood pressure greater than 160/100 and she develops symptoms such as worsening chest pain or shortness of breath.      Shortness of breath  - IV lasix  - supplemental oxygen as needed   -CXR: Diminishing vascular congestion  -CTA: Cardiomegaly. Mild moderate pulmonary edema. Mild left-sided pleural effusion. Small right-sided pleural effusion. Correlate clinically to CHF. No evidence for pulmonary emboli. Atypical infectious process not excluded.       VTE Risk Mitigation (From admission, onward)           Ordered     enoxaparin injection 40 mg  Daily         07/13/24 2334     IP VTE HIGH RISK PATIENT  Once         07/13/24 2334     Place sequential compression device  Until discontinued         07/13/24 2334                    Discharge Planning   DUTCH:      Code Status: Full Code   Is the patient medically ready for discharge?:     Reason for patient still in hospital (select all that apply): Patient trending condition, Treatment, and Consult recommendations  Discharge Plan A: Home Health   Discharge Delays: None known at this  time        The patient's case has been reviewed by my supervising physician.   Supervising physician will provide additional orders and recommendations at his/her discretion.  Please see supervising physicians documentation and/or orders for further details.       Sammi Mark NP  Department of Hospital Medicine   [facility]

## 2024-07-18 NOTE — SUBJECTIVE & OBJECTIVE
Review of Systems   Constitutional: Positive for malaise/fatigue.   HENT: Negative.     Eyes: Negative.    Cardiovascular:  Positive for dyspnea on exertion (improved).   Respiratory:  Positive for shortness of breath.    Endocrine: Negative.    Hematologic/Lymphatic: Negative.    Skin: Negative.    Musculoskeletal: Negative.    Gastrointestinal: Negative.    Genitourinary: Negative.    Neurological: Negative.    Psychiatric/Behavioral: Negative.     Allergic/Immunologic: Negative.      Objective:     Vital Signs (Most Recent):  Temp: 97.6 °F (36.4 °C) (07/18/24 1148)  Pulse: (!) 45 (07/18/24 1252)  Resp: 18 (07/18/24 1148)  BP: (!) 156/61 (07/18/24 1252)  SpO2: 95 % (07/18/24 1148) Vital Signs (24h Range):  Temp:  [97.6 °F (36.4 °C)-99 °F (37.2 °C)] 97.6 °F (36.4 °C)  Pulse:  [42-97] 45  Resp:  [18-20] 18  SpO2:  [92 %-98 %] 95 %  BP: (125-185)/(56-72) 156/61     Weight: 63.2 kg (139 lb 5.3 oz)  Body mass index is 19.99 kg/m².     SpO2: 95 %         Intake/Output Summary (Last 24 hours) at 7/18/2024 1303  Last data filed at 7/18/2024 0600  Gross per 24 hour   Intake 490 ml   Output 700 ml   Net -210 ml       Lines/Drains/Airways       Drain  Duration             Female External Urinary Catheter w/ Suction 07/17/24 1 day              Peripheral Intravenous Line  Duration                  Peripheral IV - Single Lumen 07/13/24 1800 18 G 1 1/4 in Anterior;Proximal;Right Forearm 4 days         Peripheral IV - Single Lumen 07/13/24 1805 20 G 1 1/4 in Left Antecubital 4 days                       Physical Exam  Vitals and nursing note reviewed.   Constitutional:       General: She is not in acute distress.     Appearance: Normal appearance. She is well-developed. She is not diaphoretic.   HENT:      Head: Normocephalic and atraumatic.   Eyes:      General:         Right eye: No discharge.         Left eye: No discharge.      Pupils: Pupils are equal, round, and reactive to light.   Cardiovascular:      Rate and  "Rhythm: Regular rhythm. Bradycardia present.      Heart sounds: Normal heart sounds, S1 normal and S2 normal. No murmur heard.  Pulmonary:      Effort: Pulmonary effort is normal.      Breath sounds: Normal breath sounds.   Abdominal:      General: There is no distension.   Musculoskeletal:      Right lower leg: No edema.      Left lower leg: No edema.   Skin:     General: Skin is warm and dry.      Findings: No erythema.   Neurological:      Mental Status: She is alert and oriented to person, place, and time.   Psychiatric:         Mood and Affect: Mood normal.         Behavior: Behavior normal.            Significant Labs: CMP   Recent Labs   Lab 07/17/24  0434 07/18/24  0333    142   K 4.0 3.7    108   CO2 21* 24    101   BUN 33* 34*   CREATININE 1.0 1.0   CALCIUM 9.2 8.8   ANIONGAP 14 10   , CBC No results for input(s): "WBC", "HGB", "HCT", "PLT" in the last 48 hours., Troponin No results for input(s): "TROPONINI" in the last 48 hours., and All pertinent lab results from the last 24 hours have been reviewed.    Significant Imaging: Echocardiogram: Transthoracic echo (TTE) complete (Cupid Only):   Results for orders placed or performed during the hospital encounter of 07/13/24   Echo   Result Value Ref Range    BSA 1.86 m2    LVIDd 4.20 3.5 - 6.0 cm    LV Systolic Volume 47.52 mL    LV Systolic Volume Index 25.4 mL/m2    LVIDs 3.40 2.1 - 4.0 cm    LV Diastolic Volume 78.48 mL    LV Diastolic Volume Index 41.97 mL/m2    Left Ventricular End Systolic Volume by Teichholz Method 47.52 mL    Left Ventricular End Diastolic Volume by Teichholz Method 78.48 mL    IVS 1.53 (A) 0.6 - 1.1 cm    LVOT diameter 2.07 cm    LVOT area 3.4 cm2    FS 19 (A) 28 - 44 %    Left Ventricle Relative Wall Thickness 0.70 cm    Posterior Wall 1.47 (A) 0.6 - 1.1 cm    LV mass 249.50 g    LV Mass Index 133 g/m2    MV Peak E Bobby 1.00 m/s    TDI LATERAL 0.09 m/s    TDI SEPTAL 0.11 m/s    E/E' ratio 10.00 m/s    MV Peak A Bobby " 0.83 m/s    TR Max Bobby 3.24 m/s    E/A ratio 1.20     IVRT 87.54 msec    E wave deceleration time 256.49 msec    LV SEPTAL E/E' RATIO 9.09 m/s    LV LATERAL E/E' RATIO 11.11 m/s    LA size 4.89 cm    Left Atrium Minor Axis 6.92 cm    Left Atrium Major Axis 7.90 cm    RA Major Axis 6.58 cm    AV regurgitation pressure 1/2 time [id number] ms    AR Max Bobby 4.16 m/s    AV mean gradient 11 mmHg    AV peak gradient 20 mmHg    Ao peak bobby 2.25 m/s    Ao VTI 59.70 cm    MV stenosis pressure 1/2 time 74.38 ms    MV valve area p 1/2 method 2.96 cm2    Triscuspid Valve Regurgitation Peak Gradient 42 mmHg    PV PEAK VELOCITY 1.41 m/s    PV peak gradient 8 mmHg    Pulmonary Valve Mean Velocity 0.91 m/s    STJ 3.00 cm    IVC diameter 1.08 cm    Mean e' 0.10 m/s    ZLVIDS 0.44     ZLVIDD -2.16     EF 55 %    TV resting pulmonary artery pressure 45 mmHg    RV TB RVSP 6 mmHg    Est. RA pres 3 mmHg    Narrative      Left Ventricle: The left ventricle is normal in size. Normal wall   thickness. There is concentric hypertrophy. There is normal systolic   function. Ejection fraction by visual approximation is 55%.    Right Ventricle: Normal right ventricular cavity size. Wall thickness   is normal. Systolic function is normal.    Aortic Valve: There is mild aortic regurgitation with a centrally   directed jet.    Pulmonary Artery: The estimated pulmonary artery systolic pressure is   45 mmHg.    IVC/SVC: Normal venous pressure at 3 mmHg.     , EKG: Reviewed, and X-Ray: CXR: X-Ray Chest 1 View (CXR):   Results for orders placed or performed during the hospital encounter of 07/13/24   X-Ray Chest 1 View    Narrative    EXAMINATION:  XR CHEST 1 VIEW    CLINICAL HISTORY:  shortness of breath;    TECHNIQUE:  Single frontal portable view of the chest was performed.    COMPARISON:  Yesterday    FINDINGS:  Mediastinum rotated.  Pulmonary interstitial markings diminishing.  Minimal blunting of the costophrenic angles redemonstrated  similar.      Impression    Diminishing vascular congestion      Electronically signed by: Sherie Carty  Date:    07/14/2024  Time:    17:30    and X-Ray Chest PA and Lateral (CXR): No results found for this visit on 07/13/24.

## 2024-07-18 NOTE — PLAN OF CARE
Continue OT POC.  SBA for bed mobility. CGA for t/fs and ambulation 120ft with RW.   C/o SOB with exertion.

## 2024-07-18 NOTE — PT/OT/SLP PROGRESS
Occupational Therapy   Treatment    Name: Kaleigh Delgado  MRN: [id number]  Admitting Diagnosis:  CHF (congestive heart failure)       Recommendations:     Discharge Recommendations: Moderate Intensity Therapy  Discharge Equipment Recommendations:  to be determined by next level of care  Barriers to discharge:  Decreased caregiver support    Assessment:     Kaleigh Delgado is a 74 y.o. female with a medical diagnosis of CHF (congestive heart failure).  She presents with the following performance deficits affecting function are weakness, impaired endurance, impaired self care skills, impaired functional mobility, gait instability, impaired balance, decreased coordination, decreased upper extremity function, decreased lower extremity function, decreased safety awareness, decreased ROM, impaired cardiopulmonary response to activity.     Rehab Prognosis:  Good; patient would benefit from acute skilled OT services to address these deficits and reach maximum level of function.       Plan:     Patient to be seen 2 x/week to address the above listed problems via self-care/home management, therapeutic activities, therapeutic exercises  Plan of Care Expires: 07/31/24  Plan of Care Reviewed with: patient    Subjective     Chief Complaint: SOB and fatigue with exertion  Patient/Family Comments/goals: improve strength and mobility   Pain/Comfort:  Pain Rating 1: 0/10    Objective:     Communicated with: Nurse and epic chart review prior to session.  Patient found HOB elevated with peripheral IV, telemetry upon OT entry to room.    General Precautions: Standard, fall    Orthopedic Precautions:N/A  Braces: N/A  Respiratory Status: Room air     Occupational Performance:     Bed Mobility:    Patient completed Rolling/Turning to Left with  stand by assistance  Patient completed Scooting/Bridging with stand by assistance  Patient completed Supine to Sit with stand by assistance     Functional Mobility/Transfers:  Patient completed Sit <>  Stand Transfer with contact guard assistance  with  rolling walker   Patient completed Bed <> Chair Transfer using Stand Pivot technique with contact guard assistance with rolling walker  Patient completed Toilet Transfer Stand Pivot technique with contact guard assistance with  rolling walker  Functional Mobility: Patient completed x120ft functional mobility with CGA and RW to increase dynamic standing balance and activity tolerance needed for ADL completion. Required multiple short standing rest breaks d/t SOB. Pt fatiguing quickly.    Activities of Daily Living:  Grooming: stand by assistance pt performed oral care and washed hands while standing at sink. Pt weightbearing through UEs on sink countertop for support.   Toileting: minimum assistance using bathroom commode, required assist with clothing management.     [facility] 6 Click ADL: 17    Treatment & Education:  Educated pt on pursed lip breathing technique and importance of activity pacing for SOB. Patient educated on role of OT in acute setting and benefits of participation. Educated on techniques to use to increase independence and decrease fall risk with functional transfers. Educated on importance of OOB activity and calling for A to transfer back to bed. Encouraged completion of B UE AROM therex throughout the day to tolerance to increase functional strength and activity tolerance. Educated patient on importance of increased tolerance to upright position and direct impact on CV endurance and strength. Patient encouraged to sit up in chair for a minimum of 2 consecutive hours per day. Patient stated understanding and in agreement with POC.     Patient left up in chair with all lines intact, call button in reach, and chair alarm on    GOALS:   Multidisciplinary Problems       Occupational Therapy Goals          Problem: Occupational Therapy    Goal Priority Disciplines Outcome Interventions   Occupational Therapy Goal     OT, PT/OT Progressing     Description: Goals to be met by: 7/31/24     Patient will increase functional independence with ADLs by performing:    UE Dressing with Modified Lee.  Grooming while standing at sink with Modified Lee.  Toileting from toilet with Modified Lee for hygiene and clothing management.   Toilet transfer to toilet with Modified Lee with RW.  Upper extremity exercise program x15 reps per handout, with independence.                         Time Tracking:     OT Date of Treatment: 07/18/24  OT Start Time: 0800  OT Stop Time: 0830  OT Total Time (min): 30 min    Billable Minutes:Self Care/Home Management 15  Therapeutic Activity 15    OT/MEGHAN: OT      Jaylene Smith OT     7/18/2024

## 2024-07-18 NOTE — ASSESSMENT & PLAN NOTE
Improving since admission  BNP pending  Wean O2 as tolerated    7/16/24  BNP still elevated  Diurese    7/18/24  -Improving, continue IV Lasix

## 2024-07-18 NOTE — ASSESSMENT & PLAN NOTE
Chronic, uncontrolled. Latest blood pressure and vitals reviewed-     Temp:  [98 °F (36.7 °C)-99 °F (37.2 °C)]   Pulse:  [42-97]   Resp:  [18-20]   BP: (125-185)/(56-72)   SpO2:  [92 %-98 %] .   Home meds for hypertension were reviewed and noted below.   Hypertension Medications               hydroCHLOROthiazide (MICROZIDE) 12.5 mg capsule Take 1 capsule by mouth every morning.     While in the hospital, will manage blood pressure as follows; Continue home antihypertensive regimen    Will utilize p.r.n. blood pressure medication only if patient's blood pressure greater than 160/100 and she develops symptoms such as worsening chest pain or shortness of breath.

## 2024-07-18 NOTE — SUBJECTIVE & OBJECTIVE
Interval History: f/u SOB. Patient awake and alert. NAD. Patient up and sitting in bedside chair. Reports improvement in SOB, reports she becomes mildly SOB with exertion. Able to maintain normal oxygen saturation on room air. Patient reports improvement in right sided CP, continue IV diuresis. Cardiology following. Pt/Ot to continue to eval and treat.     Review of Systems  Objective:     Vital Signs (Most Recent):  Temp: 99 °F (37.2 °C) (07/18/24 0838)  Pulse: (!) 45 (07/18/24 1016)  Resp: 18 (07/18/24 0838)  BP: (!) 125/56 (07/18/24 1016)  SpO2: 95 % (07/18/24 0838) Vital Signs (24h Range):  Temp:  [98 °F (36.7 °C)-99 °F (37.2 °C)] 99 °F (37.2 °C)  Pulse:  [42-97] 45  Resp:  [18-20] 18  SpO2:  [92 %-98 %] 95 %  BP: (125-185)/(56-72) 125/56     Weight: 63.2 kg (139 lb 5.3 oz)  Body mass index is 19.99 kg/m².    Intake/Output Summary (Last 24 hours) at 7/18/2024 1049  Last data filed at 7/18/2024 0600  Gross per 24 hour   Intake 490 ml   Output 700 ml   Net -210 ml         Physical Exam  Vitals and nursing note reviewed.   Constitutional:       General: She is not in acute distress.     Appearance: She is not ill-appearing.   HENT:      Mouth/Throat:      Mouth: Mucous membranes are moist.      Pharynx: Oropharynx is clear.   Eyes:      Pupils: Pupils are equal, round, and reactive to light.   Cardiovascular:      Rate and Rhythm: Regular rhythm. Bradycardia present.   Pulmonary:      Effort: Pulmonary effort is normal.      Breath sounds: Normal breath sounds. No wheezing.   Abdominal:      General: Bowel sounds are normal. There is no distension.      Palpations: Abdomen is soft.      Tenderness: There is no abdominal tenderness.   Musculoskeletal:         General: Normal range of motion.   Skin:     General: Skin is warm and dry.      Comments: BLE chronic venous stasis    Neurological:      General: No focal deficit present.      Mental Status: She is alert and oriented to person, place, and time.              Significant Labs: All pertinent labs within the past 24 hours have been reviewed.  Recent Lab Results         07/18/24  0333        Anion Gap 10       BNP 1,073  Comment: Values of less than 100 pg/ml are consistent with non-CHF populations.       BUN 34       Calcium 8.8       Chloride 108       CO2 24       Creatinine 1.0       eGFR 59       Glucose 101       Potassium 3.7       Sodium 142               Significant Imaging: I have reviewed all pertinent imaging results/findings within the past 24 hours.

## 2024-07-19 LAB
ANION GAP SERPL CALC-SCNC: 11 MMOL/L (ref 8–16)
BACTERIA BLD CULT: NORMAL
BACTERIA BLD CULT: NORMAL
BUN SERPL-MCNC: 34 MG/DL (ref 8–23)
CALCIUM SERPL-MCNC: 9 MG/DL (ref 8.7–10.5)
CHLORIDE SERPL-SCNC: 107 MMOL/L (ref 95–110)
CO2 SERPL-SCNC: 23 MMOL/L (ref 23–29)
CREAT SERPL-MCNC: 1 MG/DL (ref 0.5–1.4)
EST. GFR  (NO RACE VARIABLE): 59 ML/MIN/1.73 M^2
GLUCOSE SERPL-MCNC: 94 MG/DL (ref 70–110)
POTASSIUM SERPL-SCNC: 3.8 MMOL/L (ref 3.5–5.1)
SODIUM SERPL-SCNC: 141 MMOL/L (ref 136–145)

## 2024-07-19 PROCEDURE — 11000001 HC ACUTE MED/SURG PRIVATE ROOM

## 2024-07-19 PROCEDURE — 25000003 PHARM REV CODE 250: Performed by: INTERNAL MEDICINE

## 2024-07-19 PROCEDURE — 63600175 PHARM REV CODE 636 W HCPCS

## 2024-07-19 PROCEDURE — 25000003 PHARM REV CODE 250

## 2024-07-19 PROCEDURE — 63600175 PHARM REV CODE 636 W HCPCS: Performed by: HOSPITALIST

## 2024-07-19 PROCEDURE — 36415 COLL VENOUS BLD VENIPUNCTURE: CPT | Performed by: HOSPITALIST

## 2024-07-19 PROCEDURE — 80048 BASIC METABOLIC PNL TOTAL CA: CPT | Performed by: HOSPITALIST

## 2024-07-19 PROCEDURE — 94761 N-INVAS EAR/PLS OXIMETRY MLT: CPT

## 2024-07-19 RX ORDER — HYDRALAZINE HYDROCHLORIDE 25 MG/1
50 TABLET, FILM COATED ORAL EVERY 8 HOURS
Status: DISCONTINUED | OUTPATIENT
Start: 2024-07-19 | End: 2024-07-25

## 2024-07-19 RX ADMIN — LOSARTAN POTASSIUM 50 MG: 50 TABLET, FILM COATED ORAL at 08:07

## 2024-07-19 RX ADMIN — FUROSEMIDE 40 MG: 10 INJECTION, SOLUTION INTRAMUSCULAR; INTRAVENOUS at 08:07

## 2024-07-19 RX ADMIN — HYDRALAZINE HYDROCHLORIDE 50 MG: 25 TABLET ORAL at 04:07

## 2024-07-19 RX ADMIN — HYDRALAZINE HYDROCHLORIDE 10 MG: 10 TABLET, FILM COATED ORAL at 05:07

## 2024-07-19 RX ADMIN — HYDRALAZINE HYDROCHLORIDE 10 MG: 20 INJECTION, SOLUTION INTRAMUSCULAR; INTRAVENOUS at 12:07

## 2024-07-19 RX ADMIN — ENOXAPARIN SODIUM 40 MG: 40 INJECTION SUBCUTANEOUS at 04:07

## 2024-07-19 NOTE — PROGRESS NOTES
[facility]  Cardiology  Progress Note    Patient Name: Kaleigh Delgado  MRN: [id number]  Admission Date: 7/13/2024  Hospital Length of Stay: 6 days  Code Status: Full Code   Attending Physician: Alina Steele MD   Primary Care Physician: Aldair Kaur MD  Expected Discharge Date:   Principal Problem:CHF (congestive heart failure)    Subjective:     Hospital Course:   Patient is a 74 year old female with a past medical history of hypertension, history of CHF, followed in the past by [facility], apparent history of afib, but now in sinus rhythm. BNP greater than 2000. Chest x-ray shows CHF. EKG shows sinus bradycardia with PAC's. There is a mild increase in troponin 0.09. Patient states history of heart cath by [facility] in 2021 that was treated medically.    Recommend continue Iv diuresis, check echocardiogram, and blood pressure control. Patient will eventually need nuclear stress test. We will need to obtain cath report from [facility].      History reviewed. No pertinent past medical history.  7/15/24 pt seen and examined today, resting in bed. On supplemental O2 via NC. Denies any CP at this time. C/o aches and fatigue, tele reviewed HR 40s on monitor. Labs reviewed, -2.3L for admission, troponin 0.309, Crt 1.0. Received 4 doses of lasix since admission not on any scheduled currently. Previously followed by [facility] had LHC done in 21', she is unsure of results. will get records. Nuc stress planned for tomorrow    7/16/24 Pt seen and examined today, does not feel well. Has lots of outside stressors,  is sick. Planned on nuc stress today EKG with intermittent 2:1 AVB, pt feeling anxious will reschedule once diuresed. BNP yesterday 2727, needs diuresis.     7/17/24 pt seen and examined today, doing better off oxygen during exam sitting up in chair. Diuresing well. Labs reviewed, chart reviewed. No acute CV events reported. Tele with occ 2:1AVB will get EP consult    7/18/24-Patient seen and examined today, sitting  up in bedside chair. Feeling better, less SOB. Continues to diurese well, BNP trending down. No CP. HR stable during exam. EP on board, CRT-P planned once euvolemic. Creatinine stable.     7/19/24 pt seen and examined today sitting up in bedside chair feeling better. -4L for admission. SB on tele, CRT-P planned with EP once diuresed. Crt 1.0 today        Review of Systems   Constitutional: Positive for malaise/fatigue.   HENT: Negative.     Eyes: Negative.    Cardiovascular:  Positive for dyspnea on exertion (improved).   Respiratory:  Positive for shortness of breath.    Endocrine: Negative.    Hematologic/Lymphatic: Negative.    Skin: Negative.    Musculoskeletal: Negative.    Gastrointestinal: Negative.    Genitourinary: Negative.    Neurological: Negative.    Psychiatric/Behavioral: Negative.     Allergic/Immunologic: Negative.      Objective:     Vital Signs (Most Recent):  Temp: 98.3 °F (36.8 °C) (07/19/24 1225)  Pulse: (!) 41 (07/19/24 1225)  Resp: 18 (07/19/24 1225)  BP: (!) 190/70 (07/19/24 1225)  SpO2: (!) 90 % (07/19/24 1225) Vital Signs (24h Range):  Temp:  [98.3 °F (36.8 °C)-98.7 °F (37.1 °C)] 98.3 °F (36.8 °C)  Pulse:  [40-62] 41  Resp:  [16-20] 18  SpO2:  [90 %-96 %] 90 %  BP: (120-190)/(54-76) 190/70     Weight: 63.2 kg (139 lb 5.3 oz)  Body mass index is 19.99 kg/m².     SpO2: (!) 90 %         Intake/Output Summary (Last 24 hours) at 7/19/2024 1247  Last data filed at 7/19/2024 0800  Gross per 24 hour   Intake 720 ml   Output 1500 ml   Net -780 ml       Lines/Drains/Airways       Drain  Duration             Female External Urinary Catheter w/ Suction 07/17/24 2 days              Peripheral Intravenous Line  Duration                  Peripheral IV - Single Lumen 07/13/24 1800 18 G 1 1/4 in Anterior;Proximal;Right Forearm 5 days         Peripheral IV - Single Lumen 07/13/24 1805 20 G 1 1/4 in Left Antecubital 5 days                       Physical Exam  Vitals and nursing note reviewed.  "  Constitutional:       General: She is not in acute distress.     Appearance: Normal appearance. She is well-developed. She is not diaphoretic.   HENT:      Head: Normocephalic and atraumatic.   Eyes:      General:         Right eye: No discharge.         Left eye: No discharge.      Pupils: Pupils are equal, round, and reactive to light.   Cardiovascular:      Rate and Rhythm: Regular rhythm. Bradycardia present.      Heart sounds: Normal heart sounds, S1 normal and S2 normal. No murmur heard.  Pulmonary:      Effort: Pulmonary effort is normal.      Breath sounds: Normal breath sounds.   Abdominal:      General: There is no distension.   Musculoskeletal:      Right lower leg: No edema.      Left lower leg: No edema.   Skin:     General: Skin is warm and dry.      Findings: No erythema.   Neurological:      Mental Status: She is alert and oriented to person, place, and time.   Psychiatric:         Mood and Affect: Mood normal.         Behavior: Behavior normal.            Significant Labs: CMP   Recent Labs   Lab 07/18/24  0333 07/19/24  0619    141   K 3.7 3.8    107   CO2 24 23    94   BUN 34* 34*   CREATININE 1.0 1.0   CALCIUM 8.8 9.0   ANIONGAP 10 11   , CBC No results for input(s): "WBC", "HGB", "HCT", "PLT" in the last 48 hours., Troponin No results for input(s): "TROPONINI" in the last 48 hours., and All pertinent lab results from the last 24 hours have been reviewed.    Significant Imaging: Echocardiogram: Transthoracic echo (TTE) complete ([facility] Only):   Results for orders placed or performed during the hospital encounter of 07/13/24   Echo   Result Value Ref Range    BSA 1.86 m2    LVIDd 4.20 3.5 - 6.0 cm    LV Systolic Volume 47.52 mL    LV Systolic Volume Index 25.4 mL/m2    LVIDs 3.40 2.1 - 4.0 cm    LV Diastolic Volume 78.48 mL    LV Diastolic Volume Index 41.97 mL/m2    Left Ventricular End Systolic Volume by Teichholz Method 47.52 mL    Left Ventricular End Diastolic Volume by " Teichholz Method 78.48 mL    IVS 1.53 (A) 0.6 - 1.1 cm    LVOT diameter 2.07 cm    LVOT area 3.4 cm2    FS 19 (A) 28 - 44 %    Left Ventricle Relative Wall Thickness 0.70 cm    Posterior Wall 1.47 (A) 0.6 - 1.1 cm    LV mass 249.50 g    LV Mass Index 133 g/m2    MV Peak E Bobby 1.00 m/s    TDI LATERAL 0.09 m/s    TDI SEPTAL 0.11 m/s    E/E' ratio 10.00 m/s    MV Peak A Bobby 0.83 m/s    TR Max Bobby 3.24 m/s    E/A ratio 1.20     IVRT 87.54 msec    E wave deceleration time 256.49 msec    LV SEPTAL E/E' RATIO 9.09 m/s    LV LATERAL E/E' RATIO 11.11 m/s    LA size 4.89 cm    Left Atrium Minor Axis 6.92 cm    Left Atrium Major Axis 7.90 cm    RA Major Axis 6.58 cm    AV regurgitation pressure 1/2 time [id number] ms    AR Max Bobby 4.16 m/s    AV mean gradient 11 mmHg    AV peak gradient 20 mmHg    Ao peak bobby 2.25 m/s    Ao VTI 59.70 cm    MV stenosis pressure 1/2 time 74.38 ms    MV valve area p 1/2 method 2.96 cm2    Triscuspid Valve Regurgitation Peak Gradient 42 mmHg    PV PEAK VELOCITY 1.41 m/s    PV peak gradient 8 mmHg    Pulmonary Valve Mean Velocity 0.91 m/s    STJ 3.00 cm    IVC diameter 1.08 cm    Mean e' 0.10 m/s    ZLVIDS 0.44     ZLVIDD -2.16     EF 55 %    TV resting pulmonary artery pressure 45 mmHg    RV TB RVSP 6 mmHg    Est. RA pres 3 mmHg    Narrative      Left Ventricle: The left ventricle is normal in size. Normal wall   thickness. There is concentric hypertrophy. There is normal systolic   function. Ejection fraction by visual approximation is 55%.    Right Ventricle: Normal right ventricular cavity size. Wall thickness   is normal. Systolic function is normal.    Aortic Valve: There is mild aortic regurgitation with a centrally   directed jet.    Pulmonary Artery: The estimated pulmonary artery systolic pressure is   45 mmHg.    IVC/SVC: Normal venous pressure at 3 mmHg.     , EKG: Reviewed, and X-Ray: CXR: X-Ray Chest 1 View (CXR):   Results for orders placed or performed during the hospital  encounter of 07/13/24   X-Ray Chest 1 View    Narrative    EXAMINATION:  XR CHEST 1 VIEW    CLINICAL HISTORY:  shortness of breath;    TECHNIQUE:  Single frontal portable view of the chest was performed.    COMPARISON:  Yesterday    FINDINGS:  Mediastinum rotated.  Pulmonary interstitial markings diminishing.  Minimal blunting of the costophrenic angles redemonstrated similar.      Impression    Diminishing vascular congestion      Electronically signed by: Sherie Carty  Date:    07/14/2024  Time:    17:30    and X-Ray Chest PA and Lateral (CXR): No results found for this visit on 07/13/24.  Assessment and Plan:       * CHF (congestive heart failure)  BNP 2367, received IV lasix x4. On nasal cannula  GDMT ARB, not on BB given bradycardia  Kidney function stable  Repeat BNP pending  Echo yesterday EF 55%  Nuc stress tomorrow  F/u in [facility]    7/16/24  Hold off on nuc stress today, diurese  BNP elevated  IV diuresis cont ARB  Strict I/Os monitor renal function    7/17/24  Cont IV diuresis, ARB    7/18/24  -Improving, BNP trending down  -Continue IV diuresis for additional day  -Continue ARB    Elevated troponin  -OP MPI stress test recommended    Bradycardia  HR 40s with PVCs, asymptomatic  Avoid AV giacomo blocking agents  F/u with primary cardiologist to monitor  Home med list with only HCTZ    7/16/24  EKG during stress today with int 2:1 AVB    7/17/24  EP consulted given int 2:1AVB, AV disassociation   Appreciate recs    7/18/24  -EP on board, CRT-P planned once more euvolemic    Acute hypoxemic respiratory failure  -Secondary to CHF, improved with IV diureiss     Hypertension  -Titrate medications, cont ARB    Shortness of breath  Improving since admission  BNP pending  Wean O2 as tolerated    7/16/24  BNP still elevated  Diurese    7/18/24  -Improving, continue IV Lasix        VTE Risk Mitigation (From admission, onward)           Ordered     enoxaparin injection 40 mg  Daily         07/13/24 2334     IP  VTE HIGH RISK PATIENT  Once         07/13/24 2334     Place sequential compression device  Until discontinued         07/13/24 2334                    Yaa Salas NP  Cardiology  [facility]

## 2024-07-19 NOTE — PT/OT/SLP PROGRESS
Physical Therapy      Patient Name:  Kaleigh Delgado   MRN:  [id number]    Patient not seen today secondary to low BP (97/42 mmHg) upon attempt for treatment @1334. Hold per nurse request. Will follow-up for progressive mobility as patient's tolerance allows.

## 2024-07-19 NOTE — NURSING
Pt remains free of falls/injury. Safety precautions maintained. Pt denies pain/discomfort. IV lasix.  Cardiac diet tolerated, and 1.5mL fluid restriction adhered to. Pt tolerated sitting in chair up until lunch.  VSS. No S/S of distress noted at this time. Bed alarm set.12 hour chart check complete. Will continue to monitor.

## 2024-07-19 NOTE — PLAN OF CARE
07/19/24 1816   Post-Acute Status   Post-Acute Authorization Placement   Post-Acute Placement Status Referrals Sent   Discharge Plan   Discharge Plan A Skilled Nursing Facility   Discharge Plan B Home Health     Patient now amenable to SNF.  Referrals sent.

## 2024-07-19 NOTE — ASSESSMENT & PLAN NOTE
Chronic, uncontrolled. Latest blood pressure and vitals reviewed-     Temp:  [98.3 °F (36.8 °C)-98.7 °F (37.1 °C)]   Pulse:  [40-62]   Resp:  [16-20]   BP: (100-190)/(43-76)   SpO2:  [90 %-96 %] .   Home meds for hypertension were reviewed and noted below.   Hypertension Medications               hydroCHLOROthiazide (MICROZIDE) 12.5 mg capsule Take 1 capsule by mouth every morning.     While in the hospital, will manage blood pressure as follows; Continue home antihypertensive regimen    Will utilize p.r.n. blood pressure medication only if patient's blood pressure greater than 160/100 and she develops symptoms such as worsening chest pain or shortness of breath.

## 2024-07-19 NOTE — PLAN OF CARE
Problem: Adult Inpatient Plan of Care  Goal: Plan of Care Review  7/19/2024 1555 by Kavita Rivera RN  Outcome: Progressing  7/19/2024 1532 by Kavita Rivera RN  Outcome: Progressing  Goal: Patient-Specific Goal (Individualized)  7/19/2024 1555 by Kavita Rivera RN  Outcome: Progressing  7/19/2024 1532 by Kavita Rivera RN  Outcome: Progressing  Goal: Absence of Hospital-Acquired Illness or Injury  7/19/2024 1555 by Kavita Rivera RN  Outcome: Progressing  7/19/2024 1532 by Kavita Rivera RN  Outcome: Progressing  Goal: Optimal Comfort and Wellbeing  7/19/2024 1555 by Kavita Rivera RN  Outcome: Progressing  7/19/2024 1532 by Kavita Rivera RN  Outcome: Progressing  Goal: Readiness for Transition of Care  7/19/2024 1555 by Kavita Rivera RN  Outcome: Progressing  7/19/2024 1532 by Kavita Rivera RN  Outcome: Progressing

## 2024-07-19 NOTE — PROGRESS NOTES
[facility]  Hospital Medicine  Progress Note    Patient Name: Kaleigh Delgado  MRN: [id number]  Patient Class: IP- Inpatient   Admission Date: 7/13/2024  Length of Stay: 6 days  Attending Physician: Alina Steele MD  Primary Care Provider: Aldair Kaur MD        Subjective:     Principal Problem:CHF (congestive heart failure)        HPI:  Kaleigh Delgado is a 74 y.o. female with a PMH  has no past medical history on file. who presented to the ED for further evaluation of worsening dyspnea/shortness of breath and bilateral leg swelling x2 days duration.  Patient denies prior history of CHF and is not currently on home O2 or diuretics.  Given worsening symptoms, patient called EMS and was found to be hypoxic with O2 saturation 88% on room air and was initiated on supplemental oxygen at 6 L via nasal cannula but was titrated up to 15 L non-rebreather.  Patient reported no known alleviating factors, and aggravating factors including laying flat and with exertion.  She denied endorsing any lightheadedness, dizziness, headache, visual changes, fever, chills, sweats, nausea, vomiting, chest pain, abdominal pain, dysuria, hematuria, melena, hematochezia, diarrhea, or onset neurological deficits.  Prior to onset of symptoms, patient reported being in her usual state of health with no other concerns or complaints.  All other review of systems negative except as noted above.  Initial workup in the ED revealed patient to be tachypneic and hypoxic with elevated D-dimer measuring 1.30.  BNP 03/20/2067, troponin 0.090, flu/COVID negative, UA negative for UTI. CTA positive for mild to moderate pulmonary edema, mild left pleural effusion, small right pleural effusion, but negative for pulmonary embolus.  Patient admitted to Hospital Medicine inpatient for continued medical management and workup of new onset heart failure.    PCP: No, Primary Doctor      Overview/Hospital Course:  74 y.o. female with no past medical  history on file admitted for new onset of CHF. Patient denies history of CHF or home oxygen use. Patient is currently for Hctz 12.5 mg daily for blood pressure management. Ed work up revealed elevated D-dimer measuring 1.30. BNP 2367, troponin 0.090, flu/COVID negative, UA negative for UT  CTA positive for mild to moderate pulmonary edema, mild left pleural effusion, small right pleural effusion, but negative for pulmonary embolus. CXR showed cardiomegaly with perihilar edema. Correlate clinically to CHF. Trace pleural effusions. Correlate clinically to CHF. Echo resulted with a 55% EF. Cardiology following.    Patient bradycardic, STAT EKG. Elevated BP. PRN IV hydralazine ordered. Reported increased SOB after hydralazine administered. Gave lasix 20 mg IV once, then 40 mg IV x2 doses. Started losartan 50 mg for BP control. STAT CXR and supplemental oxygen PRN.    Trend troponin, 0.090> 0.309 >0.203   Cardiology consulted  Nuclear stress test- cancelled per cardiology due to AV block , LBBB/RBBB   PT/OT to eval and treat- evaluated and reported patient would benefit from SNF. SW saw patient and declined, stating she would be agreeable to Home health. Patient reports she has family members who can check on her frequently after d/c and states she would prefer home health.   IV lasix 40mg q12h per cardiology   Cardiology consulted Electrophysiology Ayde Dubon NP plan for CRT Pacemaker once CHF is controlled and patient more euvolemic   BNP down trending, now 1073- will repeat in a.m.     Interval History: F/U SOB. Patient is awake and alert. NAD. Patient sitting in bedside chair. r. Patient states SOB with exertion has improved. Patient able to maintain normal oxygen saturation on room air. Patient denies any CP, nausea, vomiting or abd pain. Pt/ot to continue to eval and treat. Continue IV diuresis, cardiology following. Crit 1.0, -4L since admission.     Review of Systems see interval history for ROS  Objective:      Vital Signs (Most Recent):  Temp: 98.3 °F (36.8 °C) (07/19/24 1225)  Pulse: (!) 50 (07/19/24 1423)  Resp: 18 (07/19/24 1225)  BP: (!) 128/53 (07/19/24 1339)  SpO2: (!) 90 % (07/19/24 1225) Vital Signs (24h Range):  Temp:  [98.3 °F (36.8 °C)-98.7 °F (37.1 °C)] 98.3 °F (36.8 °C)  Pulse:  [40-62] 50  Resp:  [16-20] 18  SpO2:  [90 %-96 %] 90 %  BP: (100-190)/(43-76) 128/53     Weight: 63.2 kg (139 lb 5.3 oz)  Body mass index is 19.99 kg/m².    Intake/Output Summary (Last 24 hours) at 7/19/2024 1425  Last data filed at 7/19/2024 0800  Gross per 24 hour   Intake 720 ml   Output 1500 ml   Net -780 ml         Physical Exam  Vitals and nursing note reviewed.   Constitutional:       General: She is not in acute distress.  HENT:      Mouth/Throat:      Mouth: Mucous membranes are moist.      Pharynx: Oropharynx is clear.   Eyes:      Pupils: Pupils are equal, round, and reactive to light.   Cardiovascular:      Rate and Rhythm: Regular rhythm. Bradycardia present.      Heart sounds: No murmur heard.  Pulmonary:      Effort: Pulmonary effort is normal.      Breath sounds: Normal breath sounds. No wheezing.   Abdominal:      General: Bowel sounds are normal. There is no distension.      Palpations: Abdomen is soft.      Tenderness: There is no abdominal tenderness.   Musculoskeletal:         General: Normal range of motion.   Skin:     General: Skin is warm and dry.      Comments: BLE chronic venous stasis     Neurological:      General: No focal deficit present.      Mental Status: She is alert and oriented to person, place, and time.             Significant Labs: All pertinent labs within the past 24 hours have been reviewed.  Recent Lab Results         07/19/24  0619        Anion Gap 11       BUN 34       Calcium 9.0       Chloride 107       CO2 23       Creatinine 1.0       eGFR 59       Glucose 94       Potassium 3.8       Sodium 141               Significant Imaging: I have reviewed all pertinent imaging  results/findings within the past 24 hours.    Assessment/Plan:      * CHF (congestive heart failure)  Patient is identified as having  evidence of new onset  heart failure that is Acute. CHF is currently uncontrolled due to Continued edema of extremities, Dyspnea not returned to baseline after single doses of IV diuretic, >3 pillow orthopnea, Rales/crackles on pulmonary exam, and Pulmonary edema/pleural effusion on CXR. Latest ECHO performed and demonstrates 55% EF  Continue Furosemide and monitor clinical status closely. Monitor on telemetry. Patient is on CHF pathway.  Monitor strict Is&Os and daily weights.  Place on fluid restriction of 1.5 L. Cardiology has been consulted. Continue to stress to patient importance of self efficacy and  on diet for CHF. Last BNP reviewed- and noted below   Recent Labs   Lab 07/18/24  0333   BNP 1,073*     -Cardiology consulted   -ECHO: Ejection fraction 55%   -Planned Nuc stress test cancelled per cardiology   -IV lasix 40mg q12h   -Wean O2 as tolerated   -pt/ot to eval and treat - recommend SNF, patient declined stating she would be agreeable to Home health  -Cardiology consulted Electrophysiology Ayde Dubon NP plan for CRT Pacemaker once CHF is controlled     Elevated troponin  Cardiology consulted   Trop 0.090>0.309> 0.203   IV diuresis   BNP down trending to 1073      Bradycardia  -HR 40s with PVCs, asymptomatic  -Cardiology following   -Repeat BNP pending  -Echo yesterday EF 55%  -planned nuc stress test in a.m.   -Avoid AV giacomo blocking agents  -Cardiology consulted Electrophysiology Ayde Dubon NP plan for CRT Pacemaker once CHF is controlled     Acute hypoxemic respiratory failure  Patient with Hypoxic Respiratory failure which is Acute.  she is not on home oxygen. Supplemental oxygen was provided and noted-      Signs/symptoms of respiratory failure include- tachypnea, increased work of breathing, and respiratory distress. Contributing diagnoses includes - CHF,  Pleural effusion, Pneumonia, and Pulmonary Embolus Labs and images were reviewed. Patient Has not had a recent ABG. Will treat underlying causes and adjust management of respiratory failure as follows:  Plan:  -Continue treatment and workup of CHF  -Titrate oxygen requirements as needed  -Incentive spirometry   -Monitor pulse oximetry  -Nohemy prn      Hypertension  Chronic, uncontrolled. Latest blood pressure and vitals reviewed-     Temp:  [98.3 °F (36.8 °C)-98.7 °F (37.1 °C)]   Pulse:  [40-62]   Resp:  [16-20]   BP: (100-190)/(43-76)   SpO2:  [90 %-96 %] .   Home meds for hypertension were reviewed and noted below.   Hypertension Medications               hydroCHLOROthiazide (MICROZIDE) 12.5 mg capsule Take 1 capsule by mouth every morning.     While in the hospital, will manage blood pressure as follows; Continue home antihypertensive regimen    Will utilize p.r.n. blood pressure medication only if patient's blood pressure greater than 160/100 and she develops symptoms such as worsening chest pain or shortness of breath.      Shortness of breath  - IV lasix  - supplemental oxygen as needed   -CXR: Diminishing vascular congestion  -CTA: Cardiomegaly. Mild moderate pulmonary edema. Mild left-sided pleural effusion. Small right-sided pleural effusion. Correlate clinically to CHF. No evidence for pulmonary emboli. Atypical infectious process not excluded.       VTE Risk Mitigation (From admission, onward)           Ordered     enoxaparin injection 40 mg  Daily         07/13/24 2334     IP VTE HIGH RISK PATIENT  Once         07/13/24 2334     Place sequential compression device  Until discontinued         07/13/24 2334                    Discharge Planning   DUTCH:      Code Status: Full Code   Is the patient medically ready for discharge?:     Reason for patient still in hospital (select all that apply): Patient trending condition, Treatment, and Consult recommendations  Discharge Plan A: Home Health   Discharge  Delays: None known at this time      The patient's case has been reviewed by my supervising physician.   Supervising physician will provide additional orders and recommendations at his/her discretion.  Please see supervising physicians documentation and/or orders for further details.           Sammi Mark NP  Department of Hospital Medicine   [facility]

## 2024-07-19 NOTE — ASSESSMENT & PLAN NOTE
Psychiatric Follow Up Progress Note    Patient: Mitra Frazier Date: 10/12/2021   : 1942         AGE: 79 year old MR#: [id number]     This visit was conducted over the telephone as a result of the COVID-19 pandemic. This patient verbally consents to a telephone visit. Patient identifies as Mitra Frazier and reports she is currently located at home.     The telephone-only visit was being conducted for the purpose of providing treatment advice during a public health emergency. The treatment advice that was being provided was based on what was reported by the patient. Without the patient being seen and evaluated in person, there would be some risk that the information and/or assessment provided by the [facility] provider might be incomplete or inaccurate.     Chief Complaint:  Worsen anxiety.    History of Present Illness:  Patient at home and I am in my office today.     on phone with patient.    Patient was started on BuSpar 3 weeks ago and  notes worst, significantly, patient's level of mentation, memory, etcetera.  At times does not even recognize the  or her children.  No psychotic symptoms, no suicidal ideation no agitation.    Mornings with even worse anxiety than usual.   denies medication side effects with regard to Xanax.    Recent PHQ 2/9 Score    PHQ 2:  Date Adult PHQ 2 Score Adult PHQ 2 Interpretation   2021 0 No further screening needed       PHQ 9:  Date Adult PHQ 9 Score Adult PHQ 9 Interpretation   2020 5 Mild Depression       Most Recent ALIZA 7 Score       Date ALIZA 7 Score   2020 6        Vital Signs:   There were no vitals taken for this visit.    Medications:  Current Outpatient Medications   Medication Sig   • mirabegron ER (Myrbetriq) 25 MG 24 hour tablet Take 1 tablet by mouth daily.   • mirabegron ER (Myrbetriq) 50 MG 24 hr tablet 1/2 - 1 tablet daily   • memantine (NAMENDA) 10 MG tablet TAKE ONE TABLET BY MOUTH TWICE A DAY   • carbidopa-levodopa  Improving since admission  BNP pending  Wean O2 as tolerated    7/16/24  BNP still elevated  Diurese    7/18/24  -Improving, continue IV Lasix   (SINEMET CR)  MG per CR tablet Take 1 tablet by mouth nightly.   • ALPRAZolam (XANAX) 0.25 MG tablet Take 1 tablet by mouth 3 times daily. Indications: Feeling Anxious   • TEMazepam (RESTORIL) 7.5 MG capsule Take 1 capsule by mouth at bedtime. Indications: Trouble Sleeping   • mirtazapine (REMERON) 45 MG tablet Take 1 tablet by mouth nightly.   • sertraline (ZOLOFT) 100 MG tablet Take 2 tablets by mouth daily. Indications: Generalized Anxiety Disorder   • rivastigmine (EXELON) 1.5 MG capsule TAKE ONE CAPSULE BY MOUTH TWICE A DAY   • carbidopa-levodopa (SINEMET)  MG per tablet Take 1 tablet by mouth 4 times daily. 30 minutes before meals   • DAILY MULTIVITAMIN PO TABS 1 TABLET DAILY     No current facility-administered medications for this visit.       Allergies:  ALLERGIES:   Allergen Reactions   • Augmentin [Amoclan] GI UPSET   • Sulfa Antibiotics RASH       Laboratory Tests or other studies:  no further labwork indicated    MENTAL STATUS EXAM  Cooperativity:  cooperative, forthcoming, appears reliable.    Speech:  normal rate, normal tone, and volume.   Language:  no abnormality noted.    Mood: Noted in HPI.  Affect:  mood-congruent, stable, normal range.  Thought Process:  linear, logical, goal-directed.    Thought Content:  no overt delusions or abnormality noted.    Perception:  no reported hallucinations.  Consciousness:  awake and alert.   Orientation:  oriented to person, place and time.   Memory:  good, able to demonstrate accurate historical recall for recent and more remote events and discussions.  Attention:  good, no fluctuation or obvious deficit noted and able to follow the conversation.   Fund of Knowledge:  consistent with education and experiences as evidenced by vocabulary.   Insight:  good, based on appropriate recognition of impact of psychiatric symptoms and need for treatment.   Judgment:  good, based on recent decisions.  Safety:  Noted in HPI.  Motivation to pursue treatment:   Good.     Diagnosis:   ALIZA (generalized anxiety disorder)  (primary encounter diagnosis)    Assessment and Plan:   79-year-old  female with significantly increased memory and confusion issues seemingly over the past 3 weeks after was started by PCP.  Morning anxiety also much worse than unusual.  1. Diagnosis, treatment options, common medication side effects, benefits and risks, etc. all reviewed with patient today.  2. We agreed to discontinue BuSpar in case it is causing or contributing to patient's recent increase in confusion and memory issues.  3. We had to double the morning Xanax dosage which will be 0.5 mg q.a.m., continuing 0.25 mg Q afternoon and Q evening.  Patient's  agrees to reduce back to the original dosage if patient noted to be falling more than usual.  Patient and  aware of the risk of falls.  4. Visit lasted 16 minutes and patient will as  aware I will no longer be doing telephone visits and agreed to meet in person for future visits.  5.  thinks Seroquel actually making patient were so we will reduce to 25 mg q.h.s. for 4 nights and then patient will discontinue.    Follow up: 2 months      Brandon Cm MD

## 2024-07-19 NOTE — SUBJECTIVE & OBJECTIVE
Review of Systems   Constitutional: Positive for malaise/fatigue.   HENT: Negative.     Eyes: Negative.    Cardiovascular:  Positive for dyspnea on exertion (improved).   Respiratory:  Positive for shortness of breath.    Endocrine: Negative.    Hematologic/Lymphatic: Negative.    Skin: Negative.    Musculoskeletal: Negative.    Gastrointestinal: Negative.    Genitourinary: Negative.    Neurological: Negative.    Psychiatric/Behavioral: Negative.     Allergic/Immunologic: Negative.      Objective:     Vital Signs (Most Recent):  Temp: 98.3 °F (36.8 °C) (07/19/24 1225)  Pulse: (!) 41 (07/19/24 1225)  Resp: 18 (07/19/24 1225)  BP: (!) 190/70 (07/19/24 1225)  SpO2: (!) 90 % (07/19/24 1225) Vital Signs (24h Range):  Temp:  [98.3 °F (36.8 °C)-98.7 °F (37.1 °C)] 98.3 °F (36.8 °C)  Pulse:  [40-62] 41  Resp:  [16-20] 18  SpO2:  [90 %-96 %] 90 %  BP: (120-190)/(54-76) 190/70     Weight: 63.2 kg (139 lb 5.3 oz)  Body mass index is 19.99 kg/m².     SpO2: (!) 90 %         Intake/Output Summary (Last 24 hours) at 7/19/2024 1247  Last data filed at 7/19/2024 0800  Gross per 24 hour   Intake 720 ml   Output 1500 ml   Net -780 ml       Lines/Drains/Airways       Drain  Duration             Female External Urinary Catheter w/ Suction 07/17/24 2 days              Peripheral Intravenous Line  Duration                  Peripheral IV - Single Lumen 07/13/24 1800 18 G 1 1/4 in Anterior;Proximal;Right Forearm 5 days         Peripheral IV - Single Lumen 07/13/24 1805 20 G 1 1/4 in Left Antecubital 5 days                       Physical Exam  Vitals and nursing note reviewed.   Constitutional:       General: She is not in acute distress.     Appearance: Normal appearance. She is well-developed. She is not diaphoretic.   HENT:      Head: Normocephalic and atraumatic.   Eyes:      General:         Right eye: No discharge.         Left eye: No discharge.      Pupils: Pupils are equal, round, and reactive to light.   Cardiovascular:       "Rate and Rhythm: Regular rhythm. Bradycardia present.      Heart sounds: Normal heart sounds, S1 normal and S2 normal. No murmur heard.  Pulmonary:      Effort: Pulmonary effort is normal.      Breath sounds: Normal breath sounds.   Abdominal:      General: There is no distension.   Musculoskeletal:      Right lower leg: No edema.      Left lower leg: No edema.   Skin:     General: Skin is warm and dry.      Findings: No erythema.   Neurological:      Mental Status: She is alert and oriented to person, place, and time.   Psychiatric:         Mood and Affect: Mood normal.         Behavior: Behavior normal.            Significant Labs: CMP   Recent Labs   Lab 07/18/24  0333 07/19/24  0619    141   K 3.7 3.8    107   CO2 24 23    94   BUN 34* 34*   CREATININE 1.0 1.0   CALCIUM 8.8 9.0   ANIONGAP 10 11   , CBC No results for input(s): "WBC", "HGB", "HCT", "PLT" in the last 48 hours., Troponin No results for input(s): "TROPONINI" in the last 48 hours., and All pertinent lab results from the last 24 hours have been reviewed.    Significant Imaging: Echocardiogram: Transthoracic echo (TTE) complete ([facility]upid Only):   Results for orders placed or performed during the hospital encounter of 07/13/24   Echo   Result Value Ref Range    BSA 1.86 m2    LVIDd 4.20 3.5 - 6.0 cm    LV Systolic Volume 47.52 mL    LV Systolic Volume Index 25.4 mL/m2    LVIDs 3.40 2.1 - 4.0 cm    LV Diastolic Volume 78.48 mL    LV Diastolic Volume Index 41.97 mL/m2    Left Ventricular End Systolic Volume by Teichholz Method 47.52 mL    Left Ventricular End Diastolic Volume by Teichholz Method 78.48 mL    IVS 1.53 (A) 0.6 - 1.1 cm    LVOT diameter 2.07 cm    LVOT area 3.4 cm2    FS 19 (A) 28 - 44 %    Left Ventricle Relative Wall Thickness 0.70 cm    Posterior Wall 1.47 (A) 0.6 - 1.1 cm    LV mass 249.50 g    LV Mass Index 133 g/m2    MV Peak E Bobby 1.00 m/s    TDI LATERAL 0.09 m/s    TDI SEPTAL 0.11 m/s    E/E' ratio 10.00 m/s    MV Peak " A Bobby 0.83 m/s    TR Max Bobby 3.24 m/s    E/A ratio 1.20     IVRT 87.54 msec    E wave deceleration time 256.49 msec    LV SEPTAL E/E' RATIO 9.09 m/s    LV LATERAL E/E' RATIO 11.11 m/s    LA size 4.89 cm    Left Atrium Minor Axis 6.92 cm    Left Atrium Major Axis 7.90 cm    RA Major Axis 6.58 cm    AV regurgitation pressure 1/2 time [id number] ms    AR Max Bobby 4.16 m/s    AV mean gradient 11 mmHg    AV peak gradient 20 mmHg    Ao peak bobby 2.25 m/s    Ao VTI 59.70 cm    MV stenosis pressure 1/2 time 74.38 ms    MV valve area p 1/2 method 2.96 cm2    Triscuspid Valve Regurgitation Peak Gradient 42 mmHg    PV PEAK VELOCITY 1.41 m/s    PV peak gradient 8 mmHg    Pulmonary Valve Mean Velocity 0.91 m/s    STJ 3.00 cm    IVC diameter 1.08 cm    Mean e' 0.10 m/s    ZLVIDS 0.44     ZLVIDD -2.16     EF 55 %    TV resting pulmonary artery pressure 45 mmHg    RV TB RVSP 6 mmHg    Est. RA pres 3 mmHg    Narrative      Left Ventricle: The left ventricle is normal in size. Normal wall   thickness. There is concentric hypertrophy. There is normal systolic   function. Ejection fraction by visual approximation is 55%.    Right Ventricle: Normal right ventricular cavity size. Wall thickness   is normal. Systolic function is normal.    Aortic Valve: There is mild aortic regurgitation with a centrally   directed jet.    Pulmonary Artery: The estimated pulmonary artery systolic pressure is   45 mmHg.    IVC/SVC: Normal venous pressure at 3 mmHg.     , EKG: Reviewed, and X-Ray: CXR: X-Ray Chest 1 View (CXR):   Results for orders placed or performed during the hospital encounter of 07/13/24   X-Ray Chest 1 View    Narrative    EXAMINATION:  XR CHEST 1 VIEW    CLINICAL HISTORY:  shortness of breath;    TECHNIQUE:  Single frontal portable view of the chest was performed.    COMPARISON:  Yesterday    FINDINGS:  Mediastinum rotated.  Pulmonary interstitial markings diminishing.  Minimal blunting of the costophrenic angles  redemonstrated similar.      Impression    Diminishing vascular congestion      Electronically signed by: Sherie Carty  Date:    07/14/2024  Time:    17:30    and X-Ray Chest PA and Lateral (CXR): No results found for this visit on 07/13/24.

## 2024-07-19 NOTE — SUBJECTIVE & OBJECTIVE
Interval History: F/U SOB. Patient is awake and alert. NAD. Patient sitting in bedside chair. r. Patient states SOB with exertion has improved. Patient able to maintain normal oxygen saturation on room air. Patient denies any CP, nausea, vomiting or abd pain. Pt/ot to continue to eval and treat. Continue IV diuresis, cardiology following. Crit 1.0, -4L since admission.     Review of Systems see interval history for ROS  Objective:     Vital Signs (Most Recent):  Temp: 98.3 °F (36.8 °C) (07/19/24 1225)  Pulse: (!) 50 (07/19/24 1423)  Resp: 18 (07/19/24 1225)  BP: (!) 128/53 (07/19/24 1339)  SpO2: (!) 90 % (07/19/24 1225) Vital Signs (24h Range):  Temp:  [98.3 °F (36.8 °C)-98.7 °F (37.1 °C)] 98.3 °F (36.8 °C)  Pulse:  [40-62] 50  Resp:  [16-20] 18  SpO2:  [90 %-96 %] 90 %  BP: (100-190)/(43-76) 128/53     Weight: 63.2 kg (139 lb 5.3 oz)  Body mass index is 19.99 kg/m².    Intake/Output Summary (Last 24 hours) at 7/19/2024 1425  Last data filed at 7/19/2024 0800  Gross per 24 hour   Intake 720 ml   Output 1500 ml   Net -780 ml         Physical Exam  Vitals and nursing note reviewed.   Constitutional:       General: She is not in acute distress.  HENT:      Mouth/Throat:      Mouth: Mucous membranes are moist.      Pharynx: Oropharynx is clear.   Eyes:      Pupils: Pupils are equal, round, and reactive to light.   Cardiovascular:      Rate and Rhythm: Regular rhythm. Bradycardia present.      Heart sounds: No murmur heard.  Pulmonary:      Effort: Pulmonary effort is normal.      Breath sounds: Normal breath sounds. No wheezing.   Abdominal:      General: Bowel sounds are normal. There is no distension.      Palpations: Abdomen is soft.      Tenderness: There is no abdominal tenderness.   Musculoskeletal:         General: Normal range of motion.   Skin:     General: Skin is warm and dry.      Comments: BLE chronic venous stasis     Neurological:      General: No focal deficit present.      Mental Status: She is alert  and oriented to person, place, and time.             Significant Labs: All pertinent labs within the past 24 hours have been reviewed.  Recent Lab Results         07/19/24  0619        Anion Gap 11       BUN 34       Calcium 9.0       Chloride 107       CO2 23       Creatinine 1.0       eGFR 59       Glucose 94       Potassium 3.8       Sodium 141               Significant Imaging: I have reviewed all pertinent imaging results/findings within the past 24 hours.

## 2024-07-20 LAB
ANION GAP SERPL CALC-SCNC: 11 MMOL/L (ref 8–16)
BNP SERPL-MCNC: 583 PG/ML (ref 0–99)
BUN SERPL-MCNC: 34 MG/DL (ref 8–23)
CALCIUM SERPL-MCNC: 8.8 MG/DL (ref 8.7–10.5)
CHLORIDE SERPL-SCNC: 105 MMOL/L (ref 95–110)
CO2 SERPL-SCNC: 26 MMOL/L (ref 23–29)
CREAT SERPL-MCNC: 1 MG/DL (ref 0.5–1.4)
EST. GFR  (NO RACE VARIABLE): 59 ML/MIN/1.73 M^2
GLUCOSE SERPL-MCNC: 93 MG/DL (ref 70–110)
OHS QRS DURATION: 136 MS
OHS QTC CALCULATION: 504 MS
POTASSIUM SERPL-SCNC: 3.7 MMOL/L (ref 3.5–5.1)
SODIUM SERPL-SCNC: 142 MMOL/L (ref 136–145)

## 2024-07-20 PROCEDURE — 63600175 PHARM REV CODE 636 W HCPCS: Performed by: HOSPITALIST

## 2024-07-20 PROCEDURE — 11000001 HC ACUTE MED/SURG PRIVATE ROOM

## 2024-07-20 PROCEDURE — 25000003 PHARM REV CODE 250: Performed by: INTERNAL MEDICINE

## 2024-07-20 PROCEDURE — 99233 SBSQ HOSP IP/OBS HIGH 50: CPT | Mod: ,,, | Performed by: INTERNAL MEDICINE

## 2024-07-20 PROCEDURE — 25000003 PHARM REV CODE 250

## 2024-07-20 PROCEDURE — 63600175 PHARM REV CODE 636 W HCPCS

## 2024-07-20 PROCEDURE — 25000003 PHARM REV CODE 250: Performed by: HOSPITALIST

## 2024-07-20 PROCEDURE — 83880 ASSAY OF NATRIURETIC PEPTIDE: CPT

## 2024-07-20 PROCEDURE — 36415 COLL VENOUS BLD VENIPUNCTURE: CPT | Performed by: HOSPITALIST

## 2024-07-20 PROCEDURE — 80048 BASIC METABOLIC PNL TOTAL CA: CPT | Performed by: HOSPITALIST

## 2024-07-20 PROCEDURE — 97530 THERAPEUTIC ACTIVITIES: CPT | Mod: CQ

## 2024-07-20 RX ADMIN — FUROSEMIDE 40 MG: 10 INJECTION, SOLUTION INTRAMUSCULAR; INTRAVENOUS at 09:07

## 2024-07-20 RX ADMIN — FUROSEMIDE 40 MG: 10 INJECTION, SOLUTION INTRAMUSCULAR; INTRAVENOUS at 08:07

## 2024-07-20 RX ADMIN — ENOXAPARIN SODIUM 40 MG: 40 INJECTION SUBCUTANEOUS at 04:07

## 2024-07-20 RX ADMIN — SENNOSIDES AND DOCUSATE SODIUM 1 TABLET: 50; 8.6 TABLET ORAL at 08:07

## 2024-07-20 RX ADMIN — HYDRALAZINE HYDROCHLORIDE 50 MG: 25 TABLET ORAL at 09:07

## 2024-07-20 RX ADMIN — HYDRALAZINE HYDROCHLORIDE 50 MG: 25 TABLET ORAL at 05:07

## 2024-07-20 RX ADMIN — LOSARTAN POTASSIUM 50 MG: 50 TABLET, FILM COATED ORAL at 08:07

## 2024-07-20 RX ADMIN — HYDRALAZINE HYDROCHLORIDE 50 MG: 25 TABLET ORAL at 03:07

## 2024-07-20 RX ADMIN — SENNOSIDES AND DOCUSATE SODIUM 1 TABLET: 50; 8.6 TABLET ORAL at 09:07

## 2024-07-20 NOTE — ASSESSMENT & PLAN NOTE
BNP 2367, received IV lasix x4. On nasal cannula  GDMT ARB, not on BB given bradycardia  Kidney function stable  Repeat BNP pending  Echo yesterday EF 55%  Nuc stress tomorrow  F/u in [facility]    7/16/24  Hold off on nuc stress today, diurese  BNP elevated  IV diuresis cont ARB  Strict I/Os monitor renal function    7/17/24  Cont IV diuresis, ARB    7/18/24  -Improving, BNP trending down  -Continue IV diuresis for additional day  -Continue ARB    07/20/2024  Continue lasix 40 mg ivp bid   negative...

## 2024-07-20 NOTE — SUBJECTIVE & OBJECTIVE
ROS  Objective:     Vital Signs (Most Recent):  Temp: 98 °F (36.7 °C) (07/20/24 1145)  Pulse: (!) 41 (07/20/24 1305)  Resp: 20 (07/20/24 1145)  BP: (!) 135/56 (07/20/24 1145)  SpO2: 95 % (07/20/24 1145) Vital Signs (24h Range):  Temp:  [98 °F (36.7 °C)-98.4 °F (36.9 °C)] 98 °F (36.7 °C)  Pulse:  [40-53] 41  Resp:  [17-20] 20  SpO2:  [92 %-96 %] 95 %  BP: (108-176)/(47-68) 135/56     Weight: 63.2 kg (139 lb 5.3 oz)  Body mass index is 19.99 kg/m².     SpO2: 95 %         Intake/Output Summary (Last 24 hours) at 7/20/2024 1345  Last data filed at 7/20/2024 0837  Gross per 24 hour   Intake 240 ml   Output 700 ml   Net -460 ml       Lines/Drains/Airways       Peripheral Intravenous Line  Duration                  Peripheral IV - Single Lumen 07/13/24 1800 18 G 1 1/4 in Anterior;Proximal;Right Forearm 6 days         Peripheral IV - Single Lumen 07/13/24 1805 20 G 1 1/4 in Left Antecubital 6 days                       Physical Exam  Vitals and nursing note reviewed.   Constitutional:       General: She is not in acute distress.     Appearance: Normal appearance. She is well-developed. She is not diaphoretic.   HENT:      Head: Normocephalic and atraumatic.   Eyes:      General:         Right eye: No discharge.         Left eye: No discharge.      Pupils: Pupils are equal, round, and reactive to light.   Cardiovascular:      Rate and Rhythm: Bradycardia present. Rhythm irregular. Frequent Extrasystoles are present.     Heart sounds: Normal heart sounds, S1 normal and S2 normal. No murmur heard.  Pulmonary:      Effort: Pulmonary effort is normal.      Breath sounds: Normal breath sounds.   Abdominal:      General: There is no distension.   Musculoskeletal:      Right lower leg: No edema.      Left lower leg: No edema.   Skin:     General: Skin is warm and dry.      Findings: No erythema.   Neurological:      Mental Status: She is alert and oriented to person, place, and time.   Psychiatric:         Mood and Affect: Mood  "normal.         Behavior: Behavior normal.            Significant Labs: ABG: No results for input(s): "PH", "PCO2", "HCO3", "POCSATURATED", "BE" in the last 48 hours., Blood Culture: No results for input(s): "LABBLOO" in the last 48 hours., BMP:   Recent Labs   Lab 07/19/24  0619 07/20/24  0356   GLU 94 93    142   K 3.8 3.7    105   CO2 23 26   BUN 34* 34*   CREATININE 1.0 1.0   CALCIUM 9.0 8.8   , CMP   Recent Labs   Lab 07/19/24  0619 07/20/24  0356    142   K 3.8 3.7    105   CO2 23 26   GLU 94 93   BUN 34* 34*   CREATININE 1.0 1.0   CALCIUM 9.0 8.8   ANIONGAP 11 11   , CBC No results for input(s): "WBC", "HGB", "HCT", "PLT" in the last 48 hours., INR No results for input(s): "INR", "PROTIME" in the last 48 hours., Lipid Panel No results for input(s): "CHOL", "HDL", "LDLCALC", "TRIG", "CHOLHDL" in the last 48 hours., and Troponin No results for input(s): "TROPONINI" in the last 48 hours.    Significant Imaging: EKG:    "

## 2024-07-20 NOTE — PT/OT/SLP PROGRESS
Physical Therapy Treatment    Patient Name:  Kaleigh Delgado   MRN:  [id number]    Recommendations:     Discharge Recommendations: Moderate Intensity Therapy  Discharge Equipment Recommendations: to be determined by next level of care  Barriers to discharge: Decreased caregiver support    Assessment:     Kaleigh Delgado is a 74 y.o. female admitted with a medical diagnosis of CHF (congestive heart failure).  She presents with the following impairments/functional limitations: weakness, impaired endurance, impaired functional mobility, gait instability, decreased safety awareness, decreased lower extremity function, impaired cardiopulmonary response to activity.    Pt demonstrates fair tolerance of today's treatment session focused on functional transfers and ambulation. Pt's tolerance altered due to impaired endurance. Pt will continue to benefit from skilled PT services to maximize independence, reduce burden of care, and to ensure overall safety.    Rehab Prognosis: Good; patient would benefit from acute skilled PT services to address these deficits and reach maximum level of function.    Recent Surgery: * No surgery found *      Plan:     During this hospitalization, patient to be seen 3 x/week to address the identified rehab impairments via gait training, therapeutic activities, therapeutic exercises and progress toward the following goals:    Plan of Care Expires:  07/31/24    Subjective     Chief Complaint: fatigue  Patient/Family Comments/goals: none noted by patient  Pain/Comfort:  Pain Rating 1: 0/10      Objective:     Communicated with pt's nurse, Kavita, prior to session.  Patient found sitting edge of bed with peripheral IV, telemetry upon PT entry to room.     General Precautions: Standard, fall  Orthopedic Precautions: N/A  Braces: N/A  Respiratory Status: Room air     Functional Mobility:  Bed Mobility:  Pt met sitting up at the edge of the bed.  Transfers:     Sit to Stand:  contact guard assistance with  rolling walker, 2 trials  1 from EOB, 1 from toilet  Educated for proper technique including hand placement and weight shifting  Toilet Transfer: contact guard assistance with  rolling walker and grab bar using  Step Transfer  Pt requires set up assistance  Mesh brief donned in standing with assistance of clinician  Gait: Pt ambulates 75' x 2 throughout the hallway using the rolling walker with contact guard assistance to ensure safety. Pt demonstrates decreased yaya, short step length, flexed posture, and narrow CHREIE. Pt requires cueing for walker proximity, postural alignment, and activity pacing. Standing rest break provided throughout the gait trial secondary to impaired endurance.  Balance: Fair      AM-PAC 6 CLICK MOBILITY  Turning over in bed (including adjusting bedclothes, sheets and blankets)?: 4  Sitting down on and standing up from a chair with arms (e.g., wheelchair, bedside commode, etc.): 3  Moving from lying on back to sitting on the side of the bed?: 3  Moving to and from a bed to a chair (including a wheelchair)?: 3  Need to walk in hospital room?: 3  Climbing 3-5 steps with a railing?: 1  Basic Mobility Total Score: 17       Treatment & Education:  Pt educated on the role of therapy, goals of the session, proper transfer technique to ensure safety, and proper gait mechanics including use of AD to reduce the risk of falls. Pt with good understanding.    Patient left up in chair with all lines intact, call button in reach, chair alarm on, pt's nurse notified, and MD present.    GOALS:   Multidisciplinary Problems       Physical Therapy Goals          Problem: Physical Therapy    Goal Priority Disciplines Outcome Goal Variances Interventions   Physical Therapy Goal     PT, PT/OT      Description: Goals to be met by 7/31/24.  1. Pt will complete bed mobility SBA.  2. Pt will complete sit to stand SBA.  3. Pt will ambulate 200ft SBA using RW.  4. Pt will increase AMPAC score by 2 points to progress  functional mobility.                       Time Tracking:     PT Received On: 07/20/24  PT Start Time: 1044     PT Stop Time: 1057  PT Total Time (min): 13 min     Billable Minutes: Therapeutic Activity 13    Treatment Type: Treatment  PT/PTA: PTA     Number of PTA visits since last PT visit: 2     07/20/2024

## 2024-07-20 NOTE — ASSESSMENT & PLAN NOTE
HR 40s with PVCs, asymptomatic  Avoid AV giacomo blocking agents  F/u with primary cardiologist to monitor  Home med list with only HCTZ    7/16/24  EKG during stress today with int 2:1 AVB    7/17/24  EP consulted given int 2:1AVB, AV disassociation   Appreciate recs    7/18/24  -EP on board, CRT-P planned once more euvolemic    07/20/2024  Remains ben and AV dissociation  CRT-P early next week per EP

## 2024-07-20 NOTE — ASSESSMENT & PLAN NOTE
Patient is identified as having  evidence of new onset  heart failure that is Acute. CHF is currently uncontrolled due to Continued edema of extremities, Dyspnea not returned to baseline after single doses of IV diuretic, >3 pillow orthopnea, Rales/crackles on pulmonary exam, and Pulmonary edema/pleural effusion on CXR. Latest ECHO performed and demonstrates 55% EF  Continue Furosemide and monitor clinical status closely. Monitor on telemetry. Patient is on CHF pathway.  Monitor strict Is&Os and daily weights.  Place on fluid restriction of 1.5 L. Cardiology has been consulted. Continue to stress to patient importance of self efficacy and  on diet for CHF. Last BNP reviewed- and noted below   Recent Labs   Lab 07/20/24  0356   *     -Cardiology consulted   -ECHO: Ejection fraction 55%   -Planned Nuc stress test cancelled per cardiology   -IV lasix 40mg q12h   -Wean O2 as tolerated   -pt/ot to eval and treat - recommend SNF  consulted to aid in placement   -Cardiology consulted Electrophysiology Ayde Dubon NP plan for CRT Pacemaker once CHF is controlled with diuresis

## 2024-07-20 NOTE — PROGRESS NOTES
[facility]  Cardiology  Progress Note    Patient Name: Kaleigh Delgado  MRN: [id number]  Admission Date: 7/13/2024  Hospital Length of Stay: 7 days  Code Status: Full Code   Attending Physician: Alina Steele MD   Primary Care Physician: Aldair Kaur MD  Expected Discharge Date:   Principal Problem:CHF (congestive heart failure)    Subjective:     Hospital Course:   Patient is a 74 year old female with a past medical history of hypertension, history of CHF, followed in the past by [facility], apparent history of afib, but now in sinus rhythm. BNP greater than 2000. Chest x-ray shows CHF. EKG shows sinus bradycardia with PAC's. There is a mild increase in troponin 0.09. Patient states history of heart cath by [facility] in 2021 that was treated medically.    Recommend continue Iv diuresis, check echocardiogram, and blood pressure control. Patient will eventually need nuclear stress test. We will need to obtain cath report from [facility].        7/15/24 pt seen and examined today, resting in bed. On supplemental O2 via NC. Denies any CP at this time. C/o aches and fatigue, tele reviewed HR 40s on monitor. Labs reviewed, -2.3L for admission, troponin 0.309, Crt 1.0. Received 4 doses of lasix since admission not on any scheduled currently. Previously followed by [facility] had LHC done in 21', she is unsure of results. will get records. Nuc stress planned for tomorrow    7/16/24 Pt seen and examined today, does not feel well. Has lots of outside stressors,  is sick. Planned on nuc stress today EKG with intermittent 2:1 AVB, pt feeling anxious will reschedule once diuresed. BNP yesterday 2727, needs diuresis.     7/17/24 pt seen and examined today, doing better off oxygen during exam sitting up in chair. Diuresing well. Labs reviewed, chart reviewed. No acute CV events reported. Tele with occ 2:1AVB will get EP consult    7/18/24-Patient seen and examined today, sitting up in bedside chair. Feeling better, less SOB.  Continues to diurese well, BNP trending down. No CP. HR stable during exam. EP on board, CRT-P planned once euvolemic. Creatinine stable.     7/19/24 pt seen and examined today sitting up in bedside chair feeling better. -4L for admission. SB on tele, CRT-P planned with EP once diuresed. Crt 1.0 today    07/20/2024  Improved sob. Remain ben and AV dissociation, no dizziness faint and PND. Cr 1.0 stable I&O -4.4 liter since admission        ROS  Objective:     Vital Signs (Most Recent):  Temp: 98 °F (36.7 °C) (07/20/24 1145)  Pulse: (!) 41 (07/20/24 1305)  Resp: 20 (07/20/24 1145)  BP: (!) 135/56 (07/20/24 1145)  SpO2: 95 % (07/20/24 1145) Vital Signs (24h Range):  Temp:  [98 °F (36.7 °C)-98.4 °F (36.9 °C)] 98 °F (36.7 °C)  Pulse:  [40-53] 41  Resp:  [17-20] 20  SpO2:  [92 %-96 %] 95 %  BP: (108-176)/(47-68) 135/56     Weight: 63.2 kg (139 lb 5.3 oz)  Body mass index is 19.99 kg/m².     SpO2: 95 %         Intake/Output Summary (Last 24 hours) at 7/20/2024 1345  Last data filed at 7/20/2024 0837  Gross per 24 hour   Intake 240 ml   Output 700 ml   Net -460 ml       Lines/Drains/Airways       Peripheral Intravenous Line  Duration                  Peripheral IV - Single Lumen 07/13/24 1800 18 G 1 1/4 in Anterior;Proximal;Right Forearm 6 days         Peripheral IV - Single Lumen 07/13/24 1805 20 G 1 1/4 in Left Antecubital 6 days                       Physical Exam  Vitals and nursing note reviewed.   Constitutional:       General: She is not in acute distress.     Appearance: Normal appearance. She is well-developed. She is not diaphoretic.   HENT:      Head: Normocephalic and atraumatic.   Eyes:      General:         Right eye: No discharge.         Left eye: No discharge.      Pupils: Pupils are equal, round, and reactive to light.   Cardiovascular:      Rate and Rhythm: Bradycardia present. Rhythm irregular. Frequent Extrasystoles are present.     Heart sounds: Normal heart sounds, S1 normal and S2 normal. No  "murmur heard.  Pulmonary:      Effort: Pulmonary effort is normal.      Breath sounds: Normal breath sounds.   Abdominal:      General: There is no distension.   Musculoskeletal:      Right lower leg: No edema.      Left lower leg: No edema.   Skin:     General: Skin is warm and dry.      Findings: No erythema.   Neurological:      Mental Status: She is alert and oriented to person, place, and time.   Psychiatric:         Mood and Affect: Mood normal.         Behavior: Behavior normal.            Significant Labs: ABG: No results for input(s): "PH", "PCO2", "HCO3", "POCSATURATED", "BE" in the last 48 hours., Blood Culture: No results for input(s): "LABBLOO" in the last 48 hours., BMP:   Recent Labs   Lab 07/19/24  0619 07/20/24  0356   GLU 94 93    142   K 3.8 3.7    105   CO2 23 26   BUN 34* 34*   CREATININE 1.0 1.0   CALCIUM 9.0 8.8   , CMP   Recent Labs   Lab 07/19/24  0619 07/20/24  0356    142   K 3.8 3.7    105   CO2 23 26   GLU 94 93   BUN 34* 34*   CREATININE 1.0 1.0   CALCIUM 9.0 8.8   ANIONGAP 11 11   , CBC No results for input(s): "WBC", "HGB", "HCT", "PLT" in the last 48 hours., INR No results for input(s): "INR", "PROTIME" in the last 48 hours., Lipid Panel No results for input(s): "CHOL", "HDL", "LDLCALC", "TRIG", "CHOLHDL" in the last 48 hours., and Troponin No results for input(s): "TROPONINI" in the last 48 hours.    Significant Imaging: EKG:    Assessment and Plan:         * CHF (congestive heart failure)  BNP 2367, received IV lasix x4. On nasal cannula  GDMT ARB, not on BB given bradycardia  Kidney function stable  Repeat BNP pending  Echo yesterday EF 55%  Nuc stress tomorrow  F/u in [facility]    7/16/24  Hold off on nuc stress today, diurese  BNP elevated  IV diuresis cont ARB  Strict I/Os monitor renal function    7/17/24  Cont IV diuresis, ARB    7/18/24  -Improving, BNP trending down  -Continue IV diuresis for additional day  -Continue ARB    07/20/2024  Continue lasix 40 " mg ivp bid    Elevated troponin  -OP MPI stress test recommended    Bradycardia  HR 40s with PVCs, asymptomatic  Avoid AV giacomo blocking agents  F/u with primary cardiologist to monitor  Home med list with only HCTZ    7/16/24  EKG during stress today with int 2:1 AVB    7/17/24  EP consulted given int 2:1AVB, AV disassociation   Appreciate recs    7/18/24  -EP on board, CRT-P planned once more euvolemic    07/20/2024  Remains ben and AV dissociation  CRT-P early next week per EP      Acute hypoxemic respiratory failure  -Secondary to CHF, improved with IV diureiss     Hypertension  -Titrate medications, cont ARB    07/20/2024  Continue hydralazine and ARB  Avoid avn blocker due to ben and AV dissociation      Shortness of breath  Improving since admission  BNP pending  Wean O2 as tolerated    7/16/24  BNP still elevated  Diurese    7/18/24  -Improving, continue IV Lasix        VTE Risk Mitigation (From admission, onward)           Ordered     enoxaparin injection 40 mg  Daily         07/13/24 2334     IP VTE HIGH RISK PATIENT  Once         07/13/24 2334     Place sequential compression device  Until discontinued         07/13/24 2334                    Hakan Aquino MD  Cardiology  [facility]

## 2024-07-20 NOTE — ASSESSMENT & PLAN NOTE
-Titrate medications, cont ARB    07/20/2024  Continue hydralazine and ARB  Avoid avn blocker due to ben and AV dissociation

## 2024-07-20 NOTE — ASSESSMENT & PLAN NOTE
Chronic, uncontrolled. Latest blood pressure and vitals reviewed-     Temp:  [98 °F (36.7 °C)-98.4 °F (36.9 °C)]   Pulse:  [40-53]   Resp:  [17-20]   BP: (100-190)/(43-70)   SpO2:  [90 %-96 %] .   Home meds for hypertension were reviewed and noted below.   Hypertension Medications               hydroCHLOROthiazide (MICROZIDE) 12.5 mg capsule Take 1 capsule by mouth every morning.     While in the hospital, will manage blood pressure as follows; Continue home antihypertensive regimen    Will utilize p.r.n. blood pressure medication only if patient's blood pressure greater than 160/100 and she develops symptoms such as worsening chest pain or shortness of breath.

## 2024-07-20 NOTE — PROGRESS NOTES
[facility]  Hospital Medicine  Progress Note    Patient Name: Kaleigh Delgado  MRN: [id number]  Patient Class: IP- Inpatient   Admission Date: 7/13/2024  Length of Stay: 7 days  Attending Physician: Alina Steele MD  Primary Care Provider: Aldair Kaur MD        Subjective:     Principal Problem:CHF (congestive heart failure)        HPI:  Kaleigh Delgado is a 74 y.o. female with a PMH  has no past medical history on file. who presented to the ED for further evaluation of worsening dyspnea/shortness of breath and bilateral leg swelling x2 days duration.  Patient denies prior history of CHF and is not currently on home O2 or diuretics.  Given worsening symptoms, patient called EMS and was found to be hypoxic with O2 saturation 88% on room air and was initiated on supplemental oxygen at 6 L via nasal cannula but was titrated up to 15 L non-rebreather.  Patient reported no known alleviating factors, and aggravating factors including laying flat and with exertion.  She denied endorsing any lightheadedness, dizziness, headache, visual changes, fever, chills, sweats, nausea, vomiting, chest pain, abdominal pain, dysuria, hematuria, melena, hematochezia, diarrhea, or onset neurological deficits.  Prior to onset of symptoms, patient reported being in her usual state of health with no other concerns or complaints.  All other review of systems negative except as noted above.  Initial workup in the ED revealed patient to be tachypneic and hypoxic with elevated D-dimer measuring 1.30.  BNP 03/20/2067, troponin 0.090, flu/COVID negative, UA negative for UTI. CTA positive for mild to moderate pulmonary edema, mild left pleural effusion, small right pleural effusion, but negative for pulmonary embolus.  Patient admitted to Hospital Medicine inpatient for continued medical management and workup of new onset heart failure.    PCP: No, Primary Doctor      Overview/Hospital Course:  74 y.o. female with no past medical  history on file admitted for new onset of CHF. Patient denies history of CHF or home oxygen use. Patient is currently for Hctz 12.5 mg daily for blood pressure management. Ed work up revealed elevated D-dimer measuring 1.30. BNP 2367, troponin 0.090, flu/COVID negative, UA negative for UT  CTA positive for mild to moderate pulmonary edema, mild left pleural effusion, small right pleural effusion, but negative for pulmonary embolus. CXR showed cardiomegaly with perihilar edema. Correlate clinically to CHF. Trace pleural effusions. Correlate clinically to CHF. Echo resulted with a 55% EF. Cardiology following.    Patient bradycardic, STAT EKG. Elevated BP. PRN IV hydralazine ordered. Reported increased SOB after hydralazine administered. Gave lasix 20 mg IV once, then 40 mg IV x2 doses. Started losartan 50 mg for BP control. STAT CXR and supplemental oxygen PRN.    Trend troponin, 0.090> 0.309 >0.203   Cardiology consulted  Nuclear stress test- cancelled per cardiology due to AV block , LBBB/RBBB   PT/OT to eval and treat- evaluated and reported patient would benefit from SNF. Patient now agreeable to SNF placement,  consulted to aid in placement  IV lasix 40mg q12h per cardiology   Cardiology consulted Electrophysiology Ayde Dubon NP plan for CRT Pacemaker once CHF is controlled and patient more euvolemic   BNP down trending, now 583.      Interval History: f/u SOB. Patient is awake and alert. NAD. Patient reports SOB improvement, able to maintain normal oxygen saturation on room air. Patient denies any CP, nausea, vomiting or abd pain. Pt/ot to continue to eval and treat. Cardiology following,CRT pacemaker with EP once diuresed.  and BUN/Crit 34/1.0.  consulted to aid in SNF placment.     Review of Systems see interval history for ROS   Objective:     Vital Signs (Most Recent):  Temp: 98 °F (36.7 °C) (07/20/24 0807)  Pulse: (!) 46 (07/20/24 0944)  Resp: 19 (07/20/24 0807)  BP: (!)  155/65 (07/20/24 0807)  SpO2: 95 % (07/20/24 0807) Vital Signs (24h Range):  Temp:  [98 °F (36.7 °C)-98.4 °F (36.9 °C)] 98 °F (36.7 °C)  Pulse:  [40-53] 46  Resp:  [17-19] 19  SpO2:  [90 %-96 %] 95 %  BP: (100-190)/(43-70) 155/65     Weight: 63.2 kg (139 lb 5.3 oz)  Body mass index is 19.99 kg/m².    Intake/Output Summary (Last 24 hours) at 7/20/2024 1109  Last data filed at 7/20/2024 0837  Gross per 24 hour   Intake 477 ml   Output 1100 ml   Net -623 ml         Physical Exam  Vitals and nursing note reviewed.   Constitutional:       General: She is not in acute distress.     Appearance: She is ill-appearing (chronically).   HENT:      Mouth/Throat:      Mouth: Mucous membranes are moist.      Pharynx: Oropharynx is clear.   Eyes:      Pupils: Pupils are equal, round, and reactive to light.   Cardiovascular:      Rate and Rhythm: Regular rhythm. Bradycardia present.      Heart sounds: No murmur heard.  Pulmonary:      Effort: Pulmonary effort is normal.      Breath sounds: Normal breath sounds. No wheezing.   Abdominal:      General: Bowel sounds are normal. There is no distension.      Palpations: Abdomen is soft.      Tenderness: There is no abdominal tenderness.   Musculoskeletal:         General: Normal range of motion.   Skin:     General: Skin is warm and dry.      Comments: Chronic BLE venous stasis    Neurological:      General: No focal deficit present.      Mental Status: She is alert and oriented to person, place, and time.      Motor: Weakness (generalized) present.             Significant Labs: All pertinent labs within the past 24 hours have been reviewed.  Recent Lab Results         07/20/24  0356        Anion Gap 11         Comment: Values of less than 100 pg/ml are consistent with non-CHF populations.       BUN 34       Calcium 8.8       Chloride 105       CO2 26       Creatinine 1.0       eGFR 59       Glucose 93       Potassium 3.7       Sodium 142               Significant Imaging: I have  reviewed all pertinent imaging results/findings within the past 24 hours.    Assessment/Plan:      * CHF (congestive heart failure)  Patient is identified as having  evidence of new onset  heart failure that is Acute. CHF is currently uncontrolled due to Continued edema of extremities, Dyspnea not returned to baseline after single doses of IV diuretic, >3 pillow orthopnea, Rales/crackles on pulmonary exam, and Pulmonary edema/pleural effusion on CXR. Latest ECHO performed and demonstrates 55% EF  Continue Furosemide and monitor clinical status closely. Monitor on telemetry. Patient is on CHF pathway.  Monitor strict Is&Os and daily weights.  Place on fluid restriction of 1.5 L. Cardiology has been consulted. Continue to stress to patient importance of self efficacy and  on diet for CHF. Last BNP reviewed- and noted below   Recent Labs   Lab 07/20/24  0356   *     -Cardiology consulted   -ECHO: Ejection fraction 55%   -Planned Nuc stress test cancelled per cardiology   -IV lasix 40mg q12h   -Wean O2 as tolerated   -pt/ot to eval and treat - recommend SNF  consulted to aid in placement   -Cardiology consulted Electrophysiology Ayde Dubon NP plan for CRT Pacemaker once CHF is controlled with diuresis     Elevated troponin  Cardiology consulted   Trop 0.090>0.309> 0.203   IV diuresis   BNP down trending to 583      Bradycardia  -HR 40s with PVCs, asymptomatic  -Cardiology following   -Repeat BNP pending  -Echo yesterday EF 55%  -planned nuc stress test in a.m.   -Avoid AV giacomo blocking agents  -Cardiology consulted Electrophysiology Ayde Dubon NP plan for CRT Pacemaker once CHF is controlled     Acute hypoxemic respiratory failure  Patient with Hypoxic Respiratory failure which is Acute.  she is not on home oxygen. Supplemental oxygen was provided and noted-      Signs/symptoms of respiratory failure include- tachypnea, increased work of breathing, and respiratory distress. Contributing  diagnoses includes - CHF, Pleural effusion, Pneumonia, and Pulmonary Embolus Labs and images were reviewed. Patient Has not had a recent ABG. Will treat underlying causes and adjust management of respiratory failure as follows:  Plan:  -Continue treatment and workup of CHF  -Titrate oxygen requirements as needed  -Incentive spirometry   -Monitor pulse oximetry  -Duonebs prn      Hypertension  Chronic, uncontrolled. Latest blood pressure and vitals reviewed-     Temp:  [98 °F (36.7 °C)-98.4 °F (36.9 °C)]   Pulse:  [40-53]   Resp:  [17-20]   BP: (100-190)/(43-70)   SpO2:  [90 %-96 %] .   Home meds for hypertension were reviewed and noted below.   Hypertension Medications               hydroCHLOROthiazide (MICROZIDE) 12.5 mg capsule Take 1 capsule by mouth every morning.     While in the hospital, will manage blood pressure as follows; Continue home antihypertensive regimen    Will utilize p.r.n. blood pressure medication only if patient's blood pressure greater than 160/100 and she develops symptoms such as worsening chest pain or shortness of breath.      Shortness of breath  - IV lasix  - supplemental oxygen as needed   -CXR: Diminishing vascular congestion  -CTA: Cardiomegaly. Mild moderate pulmonary edema. Mild left-sided pleural effusion. Small right-sided pleural effusion. Correlate clinically to CHF. No evidence for pulmonary emboli. Atypical infectious process not excluded.       VTE Risk Mitigation (From admission, onward)           Ordered     enoxaparin injection 40 mg  Daily         07/13/24 2334     IP VTE HIGH RISK PATIENT  Once         07/13/24 2334     Place sequential compression device  Until discontinued         07/13/24 2334                    Discharge Planning   DUTCH:      Code Status: Full Code   Is the patient medically ready for discharge?:     Reason for patient still in hospital (select all that apply): Patient trending condition, Laboratory test, Treatment, Consult recommendations, and PT /  OT recommendations  Discharge Plan A: Skilled Nursing Facility   Discharge Delays: None known at this time        The patient's case has been reviewed by my supervising physician.   Supervising physician will provide additional orders and recommendations at his/her discretion.  Please see supervising physicians documentation and/or orders for further details.         Sammi Mark NP  Department of Hospital Medicine   [facility]

## 2024-07-20 NOTE — SUBJECTIVE & OBJECTIVE
Interval History: f/u SOB. Patient is awake and alert. NAD. Patient reports SOB improvement, able to maintain normal oxygen saturation on room air. Patient denies any CP, nausea, vomiting or abd pain. Pt/ot to continue to eval and treat. Cardiology following,CRT pacemaker with EP once diuresed.  and BUN/Crit 34/1.0.  consulted to aid in SNF placment.     Review of Systems see interval history for ROS   Objective:     Vital Signs (Most Recent):  Temp: 98 °F (36.7 °C) (07/20/24 0807)  Pulse: (!) 46 (07/20/24 0944)  Resp: 19 (07/20/24 0807)  BP: (!) 155/65 (07/20/24 0807)  SpO2: 95 % (07/20/24 0807) Vital Signs (24h Range):  Temp:  [98 °F (36.7 °C)-98.4 °F (36.9 °C)] 98 °F (36.7 °C)  Pulse:  [40-53] 46  Resp:  [17-19] 19  SpO2:  [90 %-96 %] 95 %  BP: (100-190)/(43-70) 155/65     Weight: 63.2 kg (139 lb 5.3 oz)  Body mass index is 19.99 kg/m².    Intake/Output Summary (Last 24 hours) at 7/20/2024 1109  Last data filed at 7/20/2024 0837  Gross per 24 hour   Intake 477 ml   Output 1100 ml   Net -623 ml         Physical Exam  Vitals and nursing note reviewed.   Constitutional:       General: She is not in acute distress.     Appearance: She is ill-appearing (chronically).   HENT:      Mouth/Throat:      Mouth: Mucous membranes are moist.      Pharynx: Oropharynx is clear.   Eyes:      Pupils: Pupils are equal, round, and reactive to light.   Cardiovascular:      Rate and Rhythm: Regular rhythm. Bradycardia present.      Heart sounds: No murmur heard.  Pulmonary:      Effort: Pulmonary effort is normal.      Breath sounds: Normal breath sounds. No wheezing.   Abdominal:      General: Bowel sounds are normal. There is no distension.      Palpations: Abdomen is soft.      Tenderness: There is no abdominal tenderness.   Musculoskeletal:         General: Normal range of motion.   Skin:     General: Skin is warm and dry.      Comments: Chronic BLE venous stasis    Neurological:      General: No focal deficit  present.      Mental Status: She is alert and oriented to person, place, and time.      Motor: Weakness (generalized) present.             Significant Labs: All pertinent labs within the past 24 hours have been reviewed.  Recent Lab Results         07/20/24  0356        Anion Gap 11         Comment: Values of less than 100 pg/ml are consistent with non-CHF populations.       BUN 34       Calcium 8.8       Chloride 105       CO2 26       Creatinine 1.0       eGFR 59       Glucose 93       Potassium 3.7       Sodium 142               Significant Imaging: I have reviewed all pertinent imaging results/findings within the past 24 hours.

## 2024-07-21 LAB
ANION GAP SERPL CALC-SCNC: 11 MMOL/L (ref 8–16)
BUN SERPL-MCNC: 34 MG/DL (ref 8–23)
CALCIUM SERPL-MCNC: 8.8 MG/DL (ref 8.7–10.5)
CHLORIDE SERPL-SCNC: 106 MMOL/L (ref 95–110)
CO2 SERPL-SCNC: 24 MMOL/L (ref 23–29)
CREAT SERPL-MCNC: 0.9 MG/DL (ref 0.5–1.4)
EST. GFR  (NO RACE VARIABLE): >60 ML/MIN/1.73 M^2
GLUCOSE SERPL-MCNC: 98 MG/DL (ref 70–110)
POTASSIUM SERPL-SCNC: 3.5 MMOL/L (ref 3.5–5.1)
SODIUM SERPL-SCNC: 141 MMOL/L (ref 136–145)

## 2024-07-21 PROCEDURE — 63600175 PHARM REV CODE 636 W HCPCS

## 2024-07-21 PROCEDURE — 80048 BASIC METABOLIC PNL TOTAL CA: CPT | Performed by: HOSPITALIST

## 2024-07-21 PROCEDURE — 63600175 PHARM REV CODE 636 W HCPCS: Performed by: HOSPITALIST

## 2024-07-21 PROCEDURE — 97530 THERAPEUTIC ACTIVITIES: CPT | Mod: CQ

## 2024-07-21 PROCEDURE — 25000003 PHARM REV CODE 250: Performed by: INTERNAL MEDICINE

## 2024-07-21 PROCEDURE — 97116 GAIT TRAINING THERAPY: CPT | Mod: CQ

## 2024-07-21 PROCEDURE — 25000003 PHARM REV CODE 250

## 2024-07-21 PROCEDURE — 11000001 HC ACUTE MED/SURG PRIVATE ROOM

## 2024-07-21 PROCEDURE — 25000003 PHARM REV CODE 250: Performed by: HOSPITALIST

## 2024-07-21 PROCEDURE — 99232 SBSQ HOSP IP/OBS MODERATE 35: CPT | Mod: ,,, | Performed by: INTERNAL MEDICINE

## 2024-07-21 PROCEDURE — 36415 COLL VENOUS BLD VENIPUNCTURE: CPT | Performed by: HOSPITALIST

## 2024-07-21 RX ADMIN — POLYETHYLENE GLYCOL 3350 17 G: 17 POWDER, FOR SOLUTION ORAL at 09:07

## 2024-07-21 RX ADMIN — SENNOSIDES AND DOCUSATE SODIUM 1 TABLET: 50; 8.6 TABLET ORAL at 09:07

## 2024-07-21 RX ADMIN — FUROSEMIDE 40 MG: 10 INJECTION, SOLUTION INTRAMUSCULAR; INTRAVENOUS at 09:07

## 2024-07-21 RX ADMIN — HYDRALAZINE HYDROCHLORIDE 50 MG: 25 TABLET ORAL at 09:07

## 2024-07-21 RX ADMIN — HYDRALAZINE HYDROCHLORIDE 50 MG: 25 TABLET ORAL at 02:07

## 2024-07-21 RX ADMIN — LOSARTAN POTASSIUM 50 MG: 50 TABLET, FILM COATED ORAL at 09:07

## 2024-07-21 RX ADMIN — HYDRALAZINE HYDROCHLORIDE 50 MG: 25 TABLET ORAL at 05:07

## 2024-07-21 RX ADMIN — ENOXAPARIN SODIUM 40 MG: 40 INJECTION SUBCUTANEOUS at 05:07

## 2024-07-21 RX ADMIN — HYDRALAZINE HYDROCHLORIDE 10 MG: 20 INJECTION, SOLUTION INTRAMUSCULAR; INTRAVENOUS at 12:07

## 2024-07-21 NOTE — ASSESSMENT & PLAN NOTE
Chronic, uncontrolled. Latest blood pressure and vitals reviewed-     Temp:  [98 °F (36.7 °C)-98.4 °F (36.9 °C)]   Pulse:  [38-88]   Resp:  [17-20]   BP: (132-198)/(54-77)   SpO2:  [91 %-97 %] .   Home meds for hypertension were reviewed and noted below.   Hypertension Medications               hydroCHLOROthiazide (MICROZIDE) 12.5 mg capsule Take 1 capsule by mouth every morning.     While in the hospital, will manage blood pressure as follows; Continue home antihypertensive regimen    Will utilize p.r.n. blood pressure medication only if patient's blood pressure greater than 160/100 and she develops symptoms such as worsening chest pain or shortness of breath.

## 2024-07-21 NOTE — ASSESSMENT & PLAN NOTE
BNP 2367, received IV lasix x4. On nasal cannula  GDMT ARB, not on BB given bradycardia  Kidney function stable  Repeat BNP pending  Echo yesterday EF 55%  Nuc stress tomorrow  F/u in [facility]    7/16/24  Hold off on nuc stress today, diurese  BNP elevated  IV diuresis cont ARB  Strict I/Os monitor renal function    7/17/24  Cont IV diuresis, ARB    7/18/24  -Improving, BNP trending down  -Continue IV diuresis for additional day  -Continue ARB    07/20/2024  Continue lasix 40 mg ivp bid    07/21/2024  Continue lasix

## 2024-07-21 NOTE — ASSESSMENT & PLAN NOTE
-HR 40s with PVCs, asymptomatic  -Cardiology following   -BNP down trending to 583  -Echo yesterday EF 55%  -planned nuc stress test cancelled due to AV block per cardiology   -Avoid AV giacomo blocking agents  -Cardiology consulted Electrophysiology Ayde Dubon NP plan for CRT Pacemaker once CHF is controlled

## 2024-07-21 NOTE — ASSESSMENT & PLAN NOTE
HR 40s with PVCs, asymptomatic  Avoid AV giacomo blocking agents  F/u with primary cardiologist to monitor  Home med list with only HCTZ    7/16/24  EKG during stress today with int 2:1 AVB    7/17/24  EP consulted given int 2:1AVB, AV disassociation   Appreciate recs    7/18/24  -EP on board, CRT-P planned once more euvolemic    07/20/2024  Remains ben and AV dissociation  CRT-P early next week per EP    07/21/2024  Continue CHF Rx  CRT-P early next week per EP

## 2024-07-21 NOTE — SUBJECTIVE & OBJECTIVE
"  ROS  Objective:     Vital Signs (Most Recent):  Temp: 98.1 °F (36.7 °C) (07/21/24 1131)  Pulse: (!) 42 (07/21/24 1131)  Resp: 20 (07/21/24 1131)  BP: (!) 154/64 (07/21/24 1131)  SpO2: 95 % (07/21/24 1131) Vital Signs (24h Range):  Temp:  [98 °F (36.7 °C)-98.4 °F (36.9 °C)] 98.1 °F (36.7 °C)  Pulse:  [38-88] 42  Resp:  [17-20] 20  SpO2:  [91 %-97 %] 95 %  BP: (132-198)/(54-77) 154/64     Weight: 63.1 kg (139 lb 1.8 oz)  Body mass index is 19.96 kg/m².     SpO2: 95 %         Intake/Output Summary (Last 24 hours) at 7/21/2024 1426  Last data filed at 7/20/2024 1535  Gross per 24 hour   Intake 240 ml   Output --   Net 240 ml       Lines/Drains/Airways       Peripheral Intravenous Line  Duration                  Peripheral IV - Single Lumen 07/20/24 1910 22 G Left Hand <1 day                       Physical Exam  Vitals and nursing note reviewed.   Constitutional:       General: She is not in acute distress.     Appearance: Normal appearance. She is well-developed. She is not diaphoretic.   HENT:      Head: Normocephalic and atraumatic.   Eyes:      General:         Right eye: No discharge.         Left eye: No discharge.      Pupils: Pupils are equal, round, and reactive to light.   Cardiovascular:      Rate and Rhythm: Bradycardia present. Rhythm irregular. Frequent Extrasystoles are present.     Heart sounds: Normal heart sounds, S1 normal and S2 normal. No murmur heard.  Pulmonary:      Effort: Pulmonary effort is normal.      Breath sounds: Normal breath sounds.   Abdominal:      General: There is no distension.   Musculoskeletal:      Right lower leg: No edema.      Left lower leg: No edema.   Skin:     General: Skin is warm and dry.      Findings: No erythema.   Neurological:      Mental Status: She is alert and oriented to person, place, and time.   Psychiatric:         Mood and Affect: Mood normal.         Behavior: Behavior normal.            Significant Labs: ABG: No results for input(s): "PH", "PCO2", " ""HCO3", "POCSATURATED", "BE" in the last 48 hours., Blood Culture: No results for input(s): "LABBLOO" in the last 48 hours., BMP:   Recent Labs   Lab 07/20/24  0356 07/21/24  0346   GLU 93 98    141   K 3.7 3.5    106   CO2 26 24   BUN 34* 34*   CREATININE 1.0 0.9   CALCIUM 8.8 8.8   , CMP   Recent Labs   Lab 07/20/24  0356 07/21/24  0346    141   K 3.7 3.5    106   CO2 26 24   GLU 93 98   BUN 34* 34*   CREATININE 1.0 0.9   CALCIUM 8.8 8.8   ANIONGAP 11 11   , CBC No results for input(s): "WBC", "HGB", "HCT", "PLT" in the last 48 hours., INR No results for input(s): "INR", "PROTIME" in the last 48 hours., Lipid Panel No results for input(s): "CHOL", "HDL", "LDLCALC", "TRIG", "CHOLHDL" in the last 48 hours., and Troponin No results for input(s): "TROPONINI" in the last 48 hours.    Significant Imaging: EKG:    "

## 2024-07-21 NOTE — PROGRESS NOTES
[facility]  Hospital Medicine  Progress Note    Patient Name: Kaleigh Delgado  MRN: [id number]  Patient Class: IP- Inpatient   Admission Date: 7/13/2024  Length of Stay: 8 days  Attending Physician: Alina Steele MD  Primary Care Provider: Aldair Kaur MD        Subjective:     Principal Problem:CHF (congestive heart failure)        HPI:  Kaleigh Delgado is a 74 y.o. female with a PMH  has no past medical history on file. who presented to the ED for further evaluation of worsening dyspnea/shortness of breath and bilateral leg swelling x2 days duration.  Patient denies prior history of CHF and is not currently on home O2 or diuretics.  Given worsening symptoms, patient called EMS and was found to be hypoxic with O2 saturation 88% on room air and was initiated on supplemental oxygen at 6 L via nasal cannula but was titrated up to 15 L non-rebreather.  Patient reported no known alleviating factors, and aggravating factors including laying flat and with exertion.  She denied endorsing any lightheadedness, dizziness, headache, visual changes, fever, chills, sweats, nausea, vomiting, chest pain, abdominal pain, dysuria, hematuria, melena, hematochezia, diarrhea, or onset neurological deficits.  Prior to onset of symptoms, patient reported being in her usual state of health with no other concerns or complaints.  All other review of systems negative except as noted above.  Initial workup in the ED revealed patient to be tachypneic and hypoxic with elevated D-dimer measuring 1.30.  BNP 03/20/2067, troponin 0.090, flu/COVID negative, UA negative for UTI. CTA positive for mild to moderate pulmonary edema, mild left pleural effusion, small right pleural effusion, but negative for pulmonary embolus.  Patient admitted to Hospital Medicine inpatient for continued medical management and workup of new onset heart failure.    PCP: No, Primary Doctor      Overview/Hospital Course:  74 y.o. female with no past medical  history on file admitted for new onset of CHF. Patient denies history of CHF or home oxygen use. Patient is currently for Hctz 12.5 mg daily for blood pressure management. Ed work up revealed elevated D-dimer measuring 1.30. BNP 2367, troponin 0.090, flu/COVID negative, UA negative for UT  CTA positive for mild to moderate pulmonary edema, mild left pleural effusion, small right pleural effusion, but negative for pulmonary embolus. CXR showed cardiomegaly with perihilar edema. Correlate clinically to CHF. Trace pleural effusions. Correlate clinically to CHF. Echo resulted with a 55% EF. Cardiology following.    Patient bradycardic, STAT EKG. Elevated BP. PRN IV hydralazine ordered. Reported increased SOB after hydralazine administered. Gave lasix 20 mg IV once, then 40 mg IV x2 doses. Started losartan 50 mg for BP control. STAT CXR and supplemental oxygen PRN.    Trend troponin, 0.090> 0.309 >0.203   Cardiology consulted  Nuclear stress test- cancelled per cardiology due to AV block , LBBB/RBBB   PT/OT to eval and treat- evaluated and reported patient would benefit from SNF. Patient agreeable to SNF placement,  consulted to aid in placement  IV lasix 40mg q12h per cardiology   Cardiology consulted Electrophysiology Ayde Dubon NP plan for CRT Pacemaker once CHF is controlled and patient more euvolemic   BNP down trending, now 583.      Interval History:     Review of Systems see interval history for ros   Objective:     Vital Signs (Most Recent):  Temp: 98.1 °F (36.7 °C) (07/21/24 1131)  Pulse: (!) 42 (07/21/24 1131)  Resp: 20 (07/21/24 1131)  BP: (!) 154/64 (07/21/24 1131)  SpO2: 95 % (07/21/24 1131) Vital Signs (24h Range):  Temp:  [98 °F (36.7 °C)-98.4 °F (36.9 °C)] 98.1 °F (36.7 °C)  Pulse:  [38-88] 42  Resp:  [17-20] 20  SpO2:  [91 %-97 %] 95 %  BP: (132-198)/(54-77) 154/64     Weight: 63.1 kg (139 lb 1.8 oz)  Body mass index is 19.96 kg/m².    Intake/Output Summary (Last 24 hours) at 7/21/2024  1349  Last data filed at 7/20/2024 1535  Gross per 24 hour   Intake 240 ml   Output --   Net 240 ml         Physical Exam  Vitals and nursing note reviewed.   Constitutional:       General: She is not in acute distress.     Appearance: She is ill-appearing (chronically).   HENT:      Mouth/Throat:      Mouth: Mucous membranes are moist.      Pharynx: Oropharynx is clear.   Eyes:      Pupils: Pupils are equal, round, and reactive to light.   Cardiovascular:      Rate and Rhythm: Regular rhythm. Bradycardia present.      Heart sounds: No murmur heard.  Pulmonary:      Effort: Pulmonary effort is normal.      Breath sounds: Normal breath sounds. No wheezing.   Abdominal:      General: Bowel sounds are normal. There is no distension.      Palpations: Abdomen is soft.      Tenderness: There is no abdominal tenderness.   Musculoskeletal:         General: Normal range of motion.   Skin:     General: Skin is warm and dry.      Comments: BLE chronic venous stasis   Neurological:      General: No focal deficit present.      Mental Status: She is alert and oriented to person, place, and time.      Motor: Weakness (generalized) present.             Significant Labs: All pertinent labs within the past 24 hours have been reviewed.  Recent Lab Results         07/21/24  0346        Anion Gap 11       BUN 34       Calcium 8.8       Chloride 106       CO2 24       Creatinine 0.9       eGFR >60       Glucose 98       Potassium 3.5       Sodium 141               Significant Imaging: I have reviewed all pertinent imaging results/findings within the past 24 hours.    Assessment/Plan:      * CHF (congestive heart failure)  Patient is identified as having  evidence of new onset  heart failure that is Acute. CHF is currently uncontrolled due to Continued edema of extremities, Dyspnea not returned to baseline after single doses of IV diuretic, >3 pillow orthopnea, Rales/crackles on pulmonary exam, and Pulmonary edema/pleural effusion on CXR.  Latest ECHO performed and demonstrates 55% EF  Continue Furosemide and monitor clinical status closely. Monitor on telemetry. Patient is on CHF pathway.  Monitor strict Is&Os and daily weights.  Place on fluid restriction of 1.5 L. Cardiology has been consulted. Continue to stress to patient importance of self efficacy and  on diet for CHF. Last BNP reviewed- and noted below   Recent Labs   Lab 07/20/24  0356   *     -Cardiology consulted   -ECHO: Ejection fraction 55%   -Planned Nuc stress test cancelled per cardiology   -IV lasix 40mg q12h   -Wean O2 as tolerated   -pt/ot to eval and treat - recommend SNF  consulted to aid in placement   -Cardiology consulted Electrophysiology Ayde Dubon NP plan for CRT Pacemaker once CHF is controlled with diuresis     Elevated troponin  Cardiology consulted   Trop 0.090>0.309> 0.203   IV diuresis   BNP down trending to 583      Bradycardia  -HR 40s with PVCs, asymptomatic  -Cardiology following   -BNP down trending to 583  -Echo yesterday EF 55%  -planned nuc stress test cancelled due to AV block per cardiology   -Avoid AV giacomo blocking agents  -Cardiology consulted Electrophysiology Ayde Dubon NP plan for CRT Pacemaker once CHF is controlled     Acute hypoxemic respiratory failure  Patient with Hypoxic Respiratory failure which is Acute.  she is not on home oxygen. Supplemental oxygen was provided and noted-      Signs/symptoms of respiratory failure include- tachypnea, increased work of breathing, and respiratory distress. Contributing diagnoses includes - CHF, Pleural effusion, Pneumonia, and Pulmonary Embolus Labs and images were reviewed. Patient Has not had a recent ABG. Will treat underlying causes and adjust management of respiratory failure as follows:  Plan:  -Continue treatment and workup of CHF  -Titrate oxygen requirements as needed  -Incentive spirometry   -Monitor pulse oximetry  -Nohemy prn      Hypertension  Chronic, uncontrolled.  Latest blood pressure and vitals reviewed-     Temp:  [98 °F (36.7 °C)-98.4 °F (36.9 °C)]   Pulse:  [38-88]   Resp:  [17-20]   BP: (132-198)/(54-77)   SpO2:  [91 %-97 %] .   Home meds for hypertension were reviewed and noted below.   Hypertension Medications               hydroCHLOROthiazide (MICROZIDE) 12.5 mg capsule Take 1 capsule by mouth every morning.     While in the hospital, will manage blood pressure as follows; Continue home antihypertensive regimen    Will utilize p.r.n. blood pressure medication only if patient's blood pressure greater than 160/100 and she develops symptoms such as worsening chest pain or shortness of breath.      Shortness of breath  - IV lasix  - supplemental oxygen as needed   -CXR: Diminishing vascular congestion  -CTA: Cardiomegaly. Mild moderate pulmonary edema. Mild left-sided pleural effusion. Small right-sided pleural effusion. Correlate clinically to CHF. No evidence for pulmonary emboli. Atypical infectious process not excluded.       VTE Risk Mitigation (From admission, onward)           Ordered     enoxaparin injection 40 mg  Daily         07/13/24 2334     IP VTE HIGH RISK PATIENT  Once         07/13/24 2334     Place sequential compression device  Until discontinued         07/13/24 2334                    Discharge Planning   DUTCH:      Code Status: Full Code   Is the patient medically ready for discharge?:     Reason for patient still in hospital (select all that apply): Patient trending condition, Treatment, Consult recommendations, and PT / OT recommendations  Discharge Plan A: Skilled Nursing Facility   Discharge Delays: None known at this time          The patient's case has been reviewed by my supervising physician.   Supervising physician will provide additional orders and recommendations at his/her discretion.  Please see supervising physicians documentation and/or orders for further details.       Sammi Mark NP  Department of Hospital Medicine   [facility]

## 2024-07-21 NOTE — SUBJECTIVE & OBJECTIVE
Interval History:     Review of Systems see interval history for ros   Objective:     Vital Signs (Most Recent):  Temp: 98.1 °F (36.7 °C) (07/21/24 1131)  Pulse: (!) 42 (07/21/24 1131)  Resp: 20 (07/21/24 1131)  BP: (!) 154/64 (07/21/24 1131)  SpO2: 95 % (07/21/24 1131) Vital Signs (24h Range):  Temp:  [98 °F (36.7 °C)-98.4 °F (36.9 °C)] 98.1 °F (36.7 °C)  Pulse:  [38-88] 42  Resp:  [17-20] 20  SpO2:  [91 %-97 %] 95 %  BP: (132-198)/(54-77) 154/64     Weight: 63.1 kg (139 lb 1.8 oz)  Body mass index is 19.96 kg/m².    Intake/Output Summary (Last 24 hours) at 7/21/2024 1349  Last data filed at 7/20/2024 1535  Gross per 24 hour   Intake 240 ml   Output --   Net 240 ml         Physical Exam  Vitals and nursing note reviewed.   Constitutional:       General: She is not in acute distress.     Appearance: She is ill-appearing (chronically).   HENT:      Mouth/Throat:      Mouth: Mucous membranes are moist.      Pharynx: Oropharynx is clear.   Eyes:      Pupils: Pupils are equal, round, and reactive to light.   Cardiovascular:      Rate and Rhythm: Regular rhythm. Bradycardia present.      Heart sounds: No murmur heard.  Pulmonary:      Effort: Pulmonary effort is normal.      Breath sounds: Normal breath sounds. No wheezing.   Abdominal:      General: Bowel sounds are normal. There is no distension.      Palpations: Abdomen is soft.      Tenderness: There is no abdominal tenderness.   Musculoskeletal:         General: Normal range of motion.   Skin:     General: Skin is warm and dry.      Comments: BLE chronic venous stasis   Neurological:      General: No focal deficit present.      Mental Status: She is alert and oriented to person, place, and time.      Motor: Weakness (generalized) present.             Significant Labs: All pertinent labs within the past 24 hours have been reviewed.  Recent Lab Results         07/21/24  0346        Anion Gap 11       BUN 34       Calcium 8.8       Chloride 106       CO2 24        Creatinine 0.9       eGFR >60       Glucose 98       Potassium 3.5       Sodium 141               Significant Imaging: I have reviewed all pertinent imaging results/findings within the past 24 hours.

## 2024-07-21 NOTE — PLAN OF CARE
SITTING ON SOFA UPON ARRIVAL     SIT<-->STAND SBA    AMBULATED WITH RW 2 X 70' CG WITH CONVERSATION. VC FOR STANDING ERECT WHILE WALKING.     PATIENT AMBULATED FARTHER TODAY WITH MINIMAL IMPROVEMENT IN POSTURE AFTER CUES.

## 2024-07-21 NOTE — PROGRESS NOTES
[facility]  Cardiology  Progress Note    Patient Name: Kaleigh Delgado  MRN: [id number]  Admission Date: 7/13/2024  Hospital Length of Stay: 8 days  Code Status: Full Code   Attending Physician: Alina Steele MD   Primary Care Physician: Aldair Kaur MD  Expected Discharge Date:   Principal Problem:CHF (congestive heart failure)    Subjective:     Hospital Course:   Patient is a 74 year old female with a past medical history of hypertension, history of CHF, followed in the past by [facility], apparent history of afib, but now in sinus rhythm. BNP greater than 2000. Chest x-ray shows CHF. EKG shows sinus bradycardia with PAC's. There is a mild increase in troponin 0.09. Patient states history of heart cath by [facility] in 2021 that was treated medically.    Recommend continue Iv diuresis, check echocardiogram, and blood pressure control. Patient will eventually need nuclear stress test. We will need to obtain cath report from [facility].        7/15/24 pt seen and examined today, resting in bed. On supplemental O2 via NC. Denies any CP at this time. C/o aches and fatigue, tele reviewed HR 40s on monitor. Labs reviewed, -2.3L for admission, troponin 0.309, Crt 1.0. Received 4 doses of lasix since admission not on any scheduled currently. Previously followed by [facility] had LHC done in 21', she is unsure of results. will get records. Nuc stress planned for tomorrow    7/16/24 Pt seen and examined today, does not feel well. Has lots of outside stressors,  is sick. Planned on nuc stress today EKG with intermittent 2:1 AVB, pt feeling anxious will reschedule once diuresed. BNP yesterday 2727, needs diuresis.     7/17/24 pt seen and examined today, doing better off oxygen during exam sitting up in chair. Diuresing well. Labs reviewed, chart reviewed. No acute CV events reported. Tele with occ 2:1AVB will get EP consult    7/18/24-Patient seen and examined today, sitting up in bedside chair. Feeling better, less SOB.  Continues to diurese well, BNP trending down. No CP. HR stable during exam. EP on board, CRT-P planned once euvolemic. Creatinine stable.     7/19/24 pt seen and examined today sitting up in bedside chair feeling better. -4L for admission. SB on tele, CRT-P planned with EP once diuresed. Crt 1.0 today    07/20/2024  Improved sob. Remain ben and AV dissociation, no dizziness faint and PND. Cr 1.0 stable I&O -4.4 liter since admission    07/21/2024  On tele now 2:1 AV conduction, Cr stable, good UOP. SOB improved. No dizziness faint      ROS  Objective:     Vital Signs (Most Recent):  Temp: 98.1 °F (36.7 °C) (07/21/24 1131)  Pulse: (!) 42 (07/21/24 1131)  Resp: 20 (07/21/24 1131)  BP: (!) 154/64 (07/21/24 1131)  SpO2: 95 % (07/21/24 1131) Vital Signs (24h Range):  Temp:  [98 °F (36.7 °C)-98.4 °F (36.9 °C)] 98.1 °F (36.7 °C)  Pulse:  [38-88] 42  Resp:  [17-20] 20  SpO2:  [91 %-97 %] 95 %  BP: (132-198)/(54-77) 154/64     Weight: 63.1 kg (139 lb 1.8 oz)  Body mass index is 19.96 kg/m².     SpO2: 95 %         Intake/Output Summary (Last 24 hours) at 7/21/2024 1426  Last data filed at 7/20/2024 1535  Gross per 24 hour   Intake 240 ml   Output --   Net 240 ml       Lines/Drains/Airways       Peripheral Intravenous Line  Duration                  Peripheral IV - Single Lumen 07/20/24 1910 22 G Left Hand <1 day                       Physical Exam  Vitals and nursing note reviewed.   Constitutional:       General: She is not in acute distress.     Appearance: Normal appearance. She is well-developed. She is not diaphoretic.   HENT:      Head: Normocephalic and atraumatic.   Eyes:      General:         Right eye: No discharge.         Left eye: No discharge.      Pupils: Pupils are equal, round, and reactive to light.   Cardiovascular:      Rate and Rhythm: Bradycardia present. Rhythm irregular. Frequent Extrasystoles are present.     Heart sounds: Normal heart sounds, S1 normal and S2 normal. No murmur heard.  Pulmonary:     "  Effort: Pulmonary effort is normal.      Breath sounds: Normal breath sounds.   Abdominal:      General: There is no distension.   Musculoskeletal:      Right lower leg: No edema.      Left lower leg: No edema.   Skin:     General: Skin is warm and dry.      Findings: No erythema.   Neurological:      Mental Status: She is alert and oriented to person, place, and time.   Psychiatric:         Mood and Affect: Mood normal.         Behavior: Behavior normal.            Significant Labs: ABG: No results for input(s): "PH", "PCO2", "HCO3", "POCSATURATED", "BE" in the last 48 hours., Blood Culture: No results for input(s): "LABBLOO" in the last 48 hours., BMP:   Recent Labs   Lab 07/20/24  0356 07/21/24  0346   GLU 93 98    141   K 3.7 3.5    106   CO2 26 24   BUN 34* 34*   CREATININE 1.0 0.9   CALCIUM 8.8 8.8   , CMP   Recent Labs   Lab 07/20/24  0356 07/21/24  0346    141   K 3.7 3.5    106   CO2 26 24   GLU 93 98   BUN 34* 34*   CREATININE 1.0 0.9   CALCIUM 8.8 8.8   ANIONGAP 11 11   , CBC No results for input(s): "WBC", "HGB", "HCT", "PLT" in the last 48 hours., INR No results for input(s): "INR", "PROTIME" in the last 48 hours., Lipid Panel No results for input(s): "CHOL", "HDL", "LDLCALC", "TRIG", "CHOLHDL" in the last 48 hours., and Troponin No results for input(s): "TROPONINI" in the last 48 hours.    Significant Imaging: EKG:    Assessment and Plan:     * CHF (congestive heart failure)  BNP 2367, received IV lasix x4. On nasal cannula  GDMT ARB, not on BB given bradycardia  Kidney function stable  Repeat BNP pending  Echo yesterday EF 55%  Nuc stress tomorrow  F/u in [facility]    7/16/24  Hold off on nuc stress today, diurese  BNP elevated  IV diuresis cont ARB  Strict I/Os monitor renal function    7/17/24  Cont IV diuresis, ARB    7/18/24  -Improving, BNP trending down  -Continue IV diuresis for additional day  -Continue ARB    07/20/2024  Continue lasix 40 mg ivp " bid    07/21/2024  Continue lasix     Elevated troponin  -OP MPI stress test recommended    Bradycardia  HR 40s with PVCs, asymptomatic  Avoid AV giacomo blocking agents  F/u with primary cardiologist to monitor  Home med list with only HCTZ    7/16/24  EKG during stress today with int 2:1 AVB    7/17/24  EP consulted given int 2:1AVB, AV disassociation   Appreciate recs    7/18/24  -EP on board, CRT-P planned once more euvolemic    07/20/2024  Remains ben and AV dissociation  CRT-P early next week per EP    07/21/2024  Continue CHF Rx  CRT-P early next week per EP       Acute hypoxemic respiratory failure  -Secondary to CHF, improved with IV diureiss     Hypertension  -Titrate medications, cont ARB    07/20/2024  Continue hydralazine and ARB  Avoid avn blocker due to ben and AV dissociation      Shortness of breath  Improving since admission  BNP pending  Wean O2 as tolerated    7/16/24  BNP still elevated  Diurese    7/18/24  -Improving, continue IV Lasix        VTE Risk Mitigation (From admission, onward)           Ordered     enoxaparin injection 40 mg  Daily         07/13/24 2334     IP VTE HIGH RISK PATIENT  Once         07/13/24 2334     Place sequential compression device  Until discontinued         07/13/24 2334                    Hakan Aquino MD  Cardiology  [facility]

## 2024-07-21 NOTE — PLAN OF CARE
Problem: Adult Inpatient Plan of Care  Goal: Plan of Care Review  Outcome: Progressing  Goal: Patient-Specific Goal (Individualized)  Outcome: Progressing  Goal: Absence of Hospital-Acquired Illness or Injury  Outcome: Progressing  Goal: Optimal Comfort and Wellbeing  Outcome: Progressing  Goal: Readiness for Transition of Care  Outcome: Progressing     Problem: Skin Injury Risk Increased  Goal: Skin Health and Integrity  Outcome: Progressing     Problem: Fatigue  Goal: Improved Activity Tolerance  Outcome: Progressing     Problem: Heart Failure  Goal: Optimal Coping  Outcome: Progressing  Goal: Optimal Cardiac Output  Outcome: Progressing  Goal: Stable Heart Rate and Rhythm  Outcome: Progressing  Goal: Optimal Functional Ability  Outcome: Progressing  Goal: Fluid and Electrolyte Balance  Outcome: Progressing  Goal: Improved Oral Intake  Outcome: Progressing  Goal: Effective Oxygenation and Ventilation  Outcome: Progressing  Goal: Effective Breathing Pattern During Sleep  Outcome: Progressing

## 2024-07-21 NOTE — PT/OT/SLP PROGRESS
Physical Therapy  Treatment    Kaleigh Delgado   MRN: [id number]   Admitting Diagnosis: CHF (congestive heart failure)       PT Start Time: 1130     PT Stop Time: 1155    PT Total Time (min): 25 min       Billable Minutes:  Gait Training 15 and Therapeutic Activity 25    Treatment Type: Treatment  PT/PTA: PTA     Number of PTA visits since last PT visit: 3       General Precautions: Standard, fall  Orthopedic Precautions: N/A  Braces: N/A    Spiritual, Cultural Beliefs, Temple Practices, Values that Affect Care: no    Subjective:  Communicated with VIANYE prior to session.      Pain/Comfort  Pain Rating 1: 0/10  Pain Rating Post-Intervention 1: 0/10    Treatment and Education:    SITTING ON SOFA UPON ARRIVAL     SIT<-->STAND SBA    AMBULATED WITH RW 2 X 70' CG WITH CONVERSATION. VC FOR STANDING ERECT WHILE WALKING.     PATIENT AMBULATED FARTHER TODAY WITH MINIMAL IMPROVEMENT IN POSTURE AFTER CUES.     AM-PAC 6 CLICK MOBILITY  How much help from another person does this patient currently need?   1 = Unable, Total/Dependent Assistance  2 = A lot, Maximum/Moderate Assistance  3 = A little, Minimum/Contact Guard/Supervision  4 = None, Modified [facility]/Independent    Turning over in bed (including adjusting bedclothes, sheets and blankets)?:  (NA)  Sitting down on and standing up from a chair with arms (e.g., wheelchair, bedside commode, etc.): 4  Moving from lying on back to sitting on the side of the bed?:  (NA)  Moving to and from a bed to a chair (including a wheelchair)?:  (NA)  Need to walk in hospital room?: 3  Climbing 3-5 steps with a railing?: 1    AM-PAC Raw Score CMS G-Code Modifier Level of Impairment Assistance   6 % Total / Unable   7 - 9 CM 80 - 100% Maximal Assist   10 - 14 CL 60 - 80% Moderate Assist   15 - 19 CK 40 - 60% Moderate Assist   20 - 22 CJ 20 - 40% Minimal Assist   23 CI 1-20% SBA / CGA   24 CH 0% Independent/ Mod I     Patient left up in chair with all lines intact and call button  in reach.    Assessment:  Kaleigh Delgado is a 74 y.o. female with a medical diagnosis of CHF (congestive heart failure) and presents with .    Rehab identified problem list/impairments: weakness, impaired endurance, impaired self care skills, impaired functional mobility, decreased lower extremity function    Rehab potential is good.    Activity tolerance: Good    Discharge recommendations: Moderate Intensity Therapy      Barriers to discharge:      Equipment recommendations: to be determined by next level of care     GOALS:   Multidisciplinary Problems       Physical Therapy Goals          Problem: Physical Therapy    Goal Priority Disciplines Outcome Goal Variances Interventions   Physical Therapy Goal     PT, PT/OT      Description: Goals to be met by 7/31/24.  1. Pt will complete bed mobility SBA.  2. Pt will complete sit to stand SBA.  3. Pt will ambulate 200ft SBA using RW.  4. Pt will increase AMPAC score by 2 points to progress functional mobility.                       PLAN:    Patient to be seen 3 x/week to address the above listed problems via gait training, therapeutic activities, therapeutic exercises  Plan of Care expires: 07/31/24  Plan of Care reviewed with: patient         07/21/2024

## 2024-07-22 PROBLEM — I50.22 HEART FAILURE WITH MILDLY REDUCED EJECTION FRACTION (HFMREF): Status: ACTIVE | Noted: 2024-07-22

## 2024-07-22 LAB
ALBUMIN SERPL BCP-MCNC: 3.2 G/DL (ref 3.5–5.2)
ALP SERPL-CCNC: 83 U/L (ref 55–135)
ALT SERPL W/O P-5'-P-CCNC: 22 U/L (ref 10–44)
ANION GAP SERPL CALC-SCNC: 15 MMOL/L (ref 8–16)
ANION GAP SERPL CALC-SCNC: 15 MMOL/L (ref 8–16)
AST SERPL-CCNC: 27 U/L (ref 10–40)
BASOPHILS # BLD AUTO: 0.03 K/UL (ref 0–0.2)
BASOPHILS NFR BLD: 0.7 % (ref 0–1.9)
BILIRUB SERPL-MCNC: 0.8 MG/DL (ref 0.1–1)
BUN SERPL-MCNC: 45 MG/DL (ref 8–23)
BUN SERPL-MCNC: 45 MG/DL (ref 8–23)
CALCIUM SERPL-MCNC: 9.1 MG/DL (ref 8.7–10.5)
CALCIUM SERPL-MCNC: 9.1 MG/DL (ref 8.7–10.5)
CHLORIDE SERPL-SCNC: 103 MMOL/L (ref 95–110)
CHLORIDE SERPL-SCNC: 103 MMOL/L (ref 95–110)
CO2 SERPL-SCNC: 22 MMOL/L (ref 23–29)
CO2 SERPL-SCNC: 22 MMOL/L (ref 23–29)
CREAT SERPL-MCNC: 1.2 MG/DL (ref 0.5–1.4)
CREAT SERPL-MCNC: 1.2 MG/DL (ref 0.5–1.4)
DIFFERENTIAL METHOD BLD: ABNORMAL
EOSINOPHIL # BLD AUTO: 0.1 K/UL (ref 0–0.5)
EOSINOPHIL NFR BLD: 2.6 % (ref 0–8)
ERYTHROCYTE [DISTWIDTH] IN BLOOD BY AUTOMATED COUNT: 12.8 % (ref 11.5–14.5)
EST. GFR  (NO RACE VARIABLE): 48 ML/MIN/1.73 M^2
EST. GFR  (NO RACE VARIABLE): 48 ML/MIN/1.73 M^2
GLUCOSE SERPL-MCNC: 90 MG/DL (ref 70–110)
GLUCOSE SERPL-MCNC: 90 MG/DL (ref 70–110)
HCT VFR BLD AUTO: 33.7 % (ref 37–48.5)
HGB BLD-MCNC: 11.2 G/DL (ref 12–16)
IMM GRANULOCYTES # BLD AUTO: 0.02 K/UL (ref 0–0.04)
IMM GRANULOCYTES NFR BLD AUTO: 0.4 % (ref 0–0.5)
LYMPHOCYTES # BLD AUTO: 1.5 K/UL (ref 1–4.8)
LYMPHOCYTES NFR BLD: 33 % (ref 18–48)
MAGNESIUM SERPL-MCNC: 1.9 MG/DL (ref 1.6–2.6)
MCH RBC QN AUTO: 32.4 PG (ref 27–31)
MCHC RBC AUTO-ENTMCNC: 33.2 G/DL (ref 32–36)
MCV RBC AUTO: 97 FL (ref 82–98)
MONOCYTES # BLD AUTO: 0.5 K/UL (ref 0.3–1)
MONOCYTES NFR BLD: 10 % (ref 4–15)
NEUTROPHILS # BLD AUTO: 2.4 K/UL (ref 1.8–7.7)
NEUTROPHILS NFR BLD: 53.3 % (ref 38–73)
NRBC BLD-RTO: 0 /100 WBC
PLATELET # BLD AUTO: 177 K/UL (ref 150–450)
PMV BLD AUTO: 10.9 FL (ref 9.2–12.9)
POTASSIUM SERPL-SCNC: 4.3 MMOL/L (ref 3.5–5.1)
POTASSIUM SERPL-SCNC: 4.3 MMOL/L (ref 3.5–5.1)
PROT SERPL-MCNC: 6 G/DL (ref 6–8.4)
RBC # BLD AUTO: 3.46 M/UL (ref 4–5.4)
SODIUM SERPL-SCNC: 140 MMOL/L (ref 136–145)
SODIUM SERPL-SCNC: 140 MMOL/L (ref 136–145)
WBC # BLD AUTO: 4.58 K/UL (ref 3.9–12.7)

## 2024-07-22 PROCEDURE — 63600175 PHARM REV CODE 636 W HCPCS

## 2024-07-22 PROCEDURE — 11000001 HC ACUTE MED/SURG PRIVATE ROOM

## 2024-07-22 PROCEDURE — 85025 COMPLETE CBC W/AUTO DIFF WBC: CPT

## 2024-07-22 PROCEDURE — 97110 THERAPEUTIC EXERCISES: CPT

## 2024-07-22 PROCEDURE — 63600175 PHARM REV CODE 636 W HCPCS: Performed by: HOSPITALIST

## 2024-07-22 PROCEDURE — 36415 COLL VENOUS BLD VENIPUNCTURE: CPT

## 2024-07-22 PROCEDURE — 99233 SBSQ HOSP IP/OBS HIGH 50: CPT | Mod: ,,, | Performed by: NURSE PRACTITIONER

## 2024-07-22 PROCEDURE — 97116 GAIT TRAINING THERAPY: CPT | Mod: CQ

## 2024-07-22 PROCEDURE — 80053 COMPREHEN METABOLIC PANEL: CPT

## 2024-07-22 PROCEDURE — 25000003 PHARM REV CODE 250: Performed by: INTERNAL MEDICINE

## 2024-07-22 PROCEDURE — 97530 THERAPEUTIC ACTIVITIES: CPT

## 2024-07-22 PROCEDURE — 97530 THERAPEUTIC ACTIVITIES: CPT | Mod: CQ

## 2024-07-22 PROCEDURE — 83735 ASSAY OF MAGNESIUM: CPT

## 2024-07-22 PROCEDURE — 25000003 PHARM REV CODE 250

## 2024-07-22 PROCEDURE — 25000003 PHARM REV CODE 250: Performed by: HOSPITALIST

## 2024-07-22 RX ADMIN — LOSARTAN POTASSIUM 50 MG: 50 TABLET, FILM COATED ORAL at 09:07

## 2024-07-22 RX ADMIN — ENOXAPARIN SODIUM 40 MG: 40 INJECTION SUBCUTANEOUS at 05:07

## 2024-07-22 RX ADMIN — HYDRALAZINE HYDROCHLORIDE 50 MG: 25 TABLET ORAL at 09:07

## 2024-07-22 RX ADMIN — HYDRALAZINE HYDROCHLORIDE 10 MG: 20 INJECTION, SOLUTION INTRAMUSCULAR; INTRAVENOUS at 04:07

## 2024-07-22 RX ADMIN — HYDROCODONE BITARTRATE AND ACETAMINOPHEN 1 TABLET: 5; 325 TABLET ORAL at 09:07

## 2024-07-22 RX ADMIN — HYDRALAZINE HYDROCHLORIDE 50 MG: 25 TABLET ORAL at 06:07

## 2024-07-22 RX ADMIN — FUROSEMIDE 40 MG: 10 INJECTION, SOLUTION INTRAMUSCULAR; INTRAVENOUS at 09:07

## 2024-07-22 RX ADMIN — POLYETHYLENE GLYCOL 3350 17 G: 17 POWDER, FOR SOLUTION ORAL at 09:07

## 2024-07-22 RX ADMIN — SENNOSIDES AND DOCUSATE SODIUM 1 TABLET: 50; 8.6 TABLET ORAL at 09:07

## 2024-07-22 RX ADMIN — HYDRALAZINE HYDROCHLORIDE 50 MG: 25 TABLET ORAL at 01:07

## 2024-07-22 NOTE — PROGRESS NOTES
[facility]  Cardiology  Progress Note    Patient Name: Kaleigh Delgado  MRN: [id number]  Admission Date: 7/13/2024  Hospital Length of Stay: 9 days  Code Status: Full Code   Attending Physician: Zaina Lombardi,*   Primary Care Physician: Aldair Kaur MD  Expected Discharge Date:   Principal Problem:CHF (congestive heart failure)    Subjective:     Hospital Course:   Patient is a 74 year old female with a past medical history of hypertension, history of CHF, followed in the past by [facility], apparent history of afib, but now in sinus rhythm. BNP greater than 2000. Chest x-ray shows CHF. EKG shows sinus bradycardia with PAC's. There is a mild increase in troponin 0.09. Patient states history of heart cath by [facility] in 2021 that was treated medically.    Recommend continue Iv diuresis, check echocardiogram, and blood pressure control. Patient will eventually need nuclear stress test. We will need to obtain cath report from [facility].        7/15/24 pt seen and examined today, resting in bed. On supplemental O2 via NC. Denies any CP at this time. C/o aches and fatigue, tele reviewed HR 40s on monitor. Labs reviewed, -2.3L for admission, troponin 0.309, Crt 1.0. Received 4 doses of lasix since admission not on any scheduled currently. Previously followed by [facility] had LHC done in 21', she is unsure of results. will get records. Nuc stress planned for tomorrow    7/16/24 Pt seen and examined today, does not feel well. Has lots of outside stressors,  is sick. Planned on nuc stress today EKG with intermittent 2:1 AVB, pt feeling anxious will reschedule once diuresed. BNP yesterday 2727, needs diuresis.     7/17/24 pt seen and examined today, doing better off oxygen during exam sitting up in chair. Diuresing well. Labs reviewed, chart reviewed. No acute CV events reported. Tele with occ 2:1AVB will get EP consult    7/18/24-Patient seen and examined today, sitting up in bedside chair. Feeling better, less SOB.  Continues to diurese well, BNP trending down. No CP. HR stable during exam. EP on board, CRT-P planned once euvolemic. Creatinine stable.     7/19/24 pt seen and examined today sitting up in bedside chair feeling better. -4L for admission. SB on tele, CRT-P planned with EP once diuresed. Crt 1.0 today    07/20/2024  Improved sob. Remain ben and AV dissociation, no dizziness faint and PND. Cr 1.0 stable I&O -4.4 liter since admission    07/21/2024  On tele now 2:1 AV conduction, Cr stable, good UOP. SOB improved. No dizziness faint    7/22/2024  -Patient seen and examined in room, reports improvement in shortness of breath symptoms since IV diuresis. Labs reviewed, K+ 4.3, Cr 1.2, Na 140. Plan for tentative CRT P on Wednesday.     Interval History: Continue IV diuresis. Plan for CRT P this week once CHF compensated. Repeat BNP in AM.     Review of Systems   Constitutional: Positive for malaise/fatigue.   HENT:  Negative for hearing loss and hoarse voice.    Eyes:  Negative for blurred vision and visual disturbance.   Cardiovascular:  Positive for dyspnea on exertion. Negative for chest pain, claudication, irregular heartbeat, leg swelling, near-syncope, orthopnea, palpitations, paroxysmal nocturnal dyspnea and syncope.   Respiratory:  Negative for cough, hemoptysis, shortness of breath, sleep disturbances due to breathing, snoring and wheezing.    Endocrine: Negative for cold intolerance and heat intolerance.   Hematologic/Lymphatic: Does not bruise/bleed easily.   Skin:  Negative for color change, dry skin and nail changes.   Musculoskeletal:  Positive for arthritis and back pain. Negative for joint pain and myalgias.   Gastrointestinal:  Negative for bloating, abdominal pain, constipation, nausea and vomiting.   Genitourinary:  Negative for dysuria, flank pain, hematuria and hesitancy.   Neurological:  Negative for headaches, light-headedness, loss of balance, numbness, paresthesias and weakness.    Psychiatric/Behavioral:  Negative for altered mental status.    Allergic/Immunologic: Negative for environmental allergies.     Objective:     Vital Signs (Most Recent):  Temp: 97.9 °F (36.6 °C) (07/22/24 0804)  Pulse: (!) 43 (07/22/24 0832)  Resp: 17 (07/22/24 0804)  BP: (!) 139/57 (07/22/24 0804)  SpO2: (!) 93 % (07/22/24 0804) Vital Signs (24h Range):  Temp:  [97.6 °F (36.4 °C)-98.1 °F (36.7 °C)] 97.9 °F (36.6 °C)  Pulse:  [41-56] 43  Resp:  [17-21] 17  SpO2:  [93 %-99 %] 93 %  BP: (139-179)/(57-74) 139/57     Weight: 63.1 kg (139 lb 1.8 oz)  Body mass index is 19.96 kg/m².     SpO2: (!) 93 %       No intake or output data in the 24 hours ending 07/22/24 1105    Lines/Drains/Airways       Peripheral Intravenous Line  Duration                  Peripheral IV - Single Lumen 07/20/24 1910 22 G Left Hand 1 day                       Physical Exam  Vitals and nursing note reviewed.   Constitutional:       General: She is not in acute distress.     Appearance: Normal appearance. She is well-developed. She is not ill-appearing.   HENT:      Head: Normocephalic and atraumatic.      Nose: Nose normal.      Mouth/Throat:      Mouth: Mucous membranes are moist.   Eyes:      Pupils: Pupils are equal, round, and reactive to light.   Neck:      Thyroid: No thyromegaly.      Vascular: No JVD.      Trachea: No tracheal deviation.   Cardiovascular:      Rate and Rhythm: Regular rhythm. Bradycardia present.      Chest Wall: PMI is not displaced.      Pulses: Intact distal pulses.           Radial pulses are 2+ on the right side and 2+ on the left side.        Dorsalis pedis pulses are 2+ on the right side and 2+ on the left side.      Heart sounds: S1 normal and S2 normal. Heart sounds not distant. No murmur heard.     Comments: 2:1 AV block. HR 40s  Pulmonary:      Effort: Pulmonary effort is normal. No respiratory distress.      Breath sounds: Examination of the right-lower field reveals decreased breath sounds. Examination of  "the left-lower field reveals decreased breath sounds. Decreased breath sounds present. No wheezing.   Abdominal:      General: Bowel sounds are normal. There is no distension.      Palpations: Abdomen is soft.      Tenderness: There is no abdominal tenderness.   Musculoskeletal:         General: No swelling. Normal range of motion.      Cervical back: Full passive range of motion without pain, normal range of motion and neck supple.      Right lower leg: No edema.      Left lower leg: No edema.      Right ankle: No swelling.      Left ankle: No swelling.   Skin:     General: Skin is warm and dry.      Capillary Refill: Capillary refill takes less than 2 seconds.      Nails: There is no clubbing.   Neurological:      General: No focal deficit present.      Mental Status: She is alert and oriented to person, place, and time.      Motor: No weakness.   Psychiatric:         Speech: Speech normal.         Behavior: Behavior normal.         Thought Content: Thought content normal.         Judgment: Judgment normal.            Significant Labs: CMP   Recent Labs   Lab 07/21/24  0346 07/22/24  0347    140  140   K 3.5 4.3  4.3    103  103   CO2 24 22*  22*   GLU 98 90  90   BUN 34* 45*  45*   CREATININE 0.9 1.2  1.2   CALCIUM 8.8 9.1  9.1   PROT  --  6.0   ALBUMIN  --  3.2*   BILITOT  --  0.8   ALKPHOS  --  83   AST  --  27   ALT  --  22   ANIONGAP 11 15  15   , CBC   Recent Labs   Lab 07/22/24  0347   WBC 4.58   HGB 11.2*   HCT 33.7*      , Troponin No results for input(s): "TROPONINI" in the last 48 hours., and All pertinent lab results from the last 24 hours have been reviewed.    Significant Imaging: Echocardiogram: Transthoracic echo (TTE) complete (Cupid Only):   Results for orders placed or performed during the hospital encounter of 07/13/24   Echo   Result Value Ref Range    BSA 1.86 m2    LVIDd 4.20 3.5 - 6.0 cm    LV Systolic Volume 47.52 mL    LV Systolic Volume Index 25.4 mL/m2    " LVIDs 3.40 2.1 - 4.0 cm    LV Diastolic Volume 78.48 mL    LV Diastolic Volume Index 41.97 mL/m2    Left Ventricular End Systolic Volume by Teichholz Method 47.52 mL    Left Ventricular End Diastolic Volume by Teichholz Method 78.48 mL    IVS 1.53 (A) 0.6 - 1.1 cm    LVOT diameter 2.07 cm    LVOT area 3.4 cm2    FS 19 (A) 28 - 44 %    Left Ventricle Relative Wall Thickness 0.70 cm    Posterior Wall 1.47 (A) 0.6 - 1.1 cm    LV mass 249.50 g    LV Mass Index 133 g/m2    MV Peak E Bobby 1.00 m/s    TDI LATERAL 0.09 m/s    TDI SEPTAL 0.11 m/s    E/E' ratio 10.00 m/s    MV Peak A Bobby 0.83 m/s    TR Max Bobby 3.24 m/s    E/A ratio 1.20     IVRT 87.54 msec    E wave deceleration time 256.49 msec    LV SEPTAL E/E' RATIO 9.09 m/s    LV LATERAL E/E' RATIO 11.11 m/s    LA size 4.89 cm    Left Atrium Minor Axis 6.92 cm    Left Atrium Major Axis 7.90 cm    RA Major Axis 6.58 cm    AV regurgitation pressure 1/2 time 484.905939482243506 ms    AR Max Bobby 4.16 m/s    AV mean gradient 11 mmHg    AV peak gradient 20 mmHg    Ao peak bobby 2.25 m/s    Ao VTI 59.70 cm    MV stenosis pressure 1/2 time 74.38 ms    MV valve area p 1/2 method 2.96 cm2    Triscuspid Valve Regurgitation Peak Gradient 42 mmHg    PV PEAK VELOCITY 1.41 m/s    PV peak gradient 8 mmHg    Pulmonary Valve Mean Velocity 0.91 m/s    STJ 3.00 cm    IVC diameter 1.08 cm    Mean e' 0.10 m/s    ZLVIDS 0.44     ZLVIDD -2.16     EF 55 %    TV resting pulmonary artery pressure 45 mmHg    RV TB RVSP 6 mmHg    Est. RA pres 3 mmHg    Narrative      Left Ventricle: The left ventricle is normal in size. Normal wall   thickness. There is concentric hypertrophy. There is normal systolic   function. Ejection fraction by visual approximation is 55%.    Right Ventricle: Normal right ventricular cavity size. Wall thickness   is normal. Systolic function is normal.    Aortic Valve: There is mild aortic regurgitation with a centrally   directed jet.    Pulmonary Artery: The estimated pulmonary  artery systolic pressure is   45 mmHg.    IVC/SVC: Normal venous pressure at 3 mmHg.       Assessment and Plan:       * CHF (congestive heart failure)  BNP 2367, received IV lasix x4. On nasal cannula  GDMT ARB, not on BB given bradycardia  Kidney function stable  Repeat BNP pending  Echo yesterday EF 55%  Nuc stress tomorrow  [facility]    7/16/24  Hold off on nuc stress today, diurese  BNP elevated  IV diuresis cont ARB  Strict I/Os monitor renal function    7/17/24  Cont IV diuresis, ARB    7/18/24  -Improving, BNP trending down  -Continue IV diuresis for additional day  -Continue ARB    07/20/2024  Continue lasix 40 mg ivp bid    07/21/2024  Continue lasix     7/22/2024  -Avoid AV giacomo agents given bradycardia  -Continue IV lasix 40 mg BID, Losartan  -Dash diet 2 gm sodium restriction  -1500 ml fluid restriction  -Update BNP in AM    Elevated troponin  -OP MPI stress test recommended    Bradycardia  HR 40s with PVCs, asymptomatic  Avoid AV giacomo blocking agents  F/u with primary cardiologist to monitor  Home med list with only HCTZ    7/16/24  EKG during stress today with int 2:1 AVB    7/17/24  EP consulted given int 2:1AVB, AV disassociation   Appreciate recs    7/18/24  -EP on board, CRT-P planned once more euvolemic    07/20/2024  Remains ben and AV dissociation  CRT-P early next week per EP    07/21/2024  Continue CHF Rx  CRT-P early next week per EP       Acute hypoxemic respiratory failure  -Secondary to CHF, improved with IV diureiss     Hypertension  -Titrate medications, cont ARB    07/20/2024  Continue hydralazine and ARB  Avoid avn blocker due to ben and AV dissociation      Shortness of breath  Improving since admission  BNP pending  Wean O2 as tolerated    7/16/24  BNP still elevated  Diurese    7/18/24  -Improving, continue IV Lasix        VTE Risk Mitigation (From admission, onward)           Ordered     enoxaparin injection 40 mg  Daily         07/13/24 2334     IP VTE HIGH RISK PATIENT   Once         07/13/24 2334     Place sequential compression device  Until discontinued         07/13/24 2334                    VIET Chamorro-JAZMYN  Cardiology  [facility]

## 2024-07-22 NOTE — ASSESSMENT & PLAN NOTE
Patient is identified as having  evidence of new onset  heart failure that is Acute. CHF is currently uncontrolled due to Continued edema of extremities, Dyspnea not returned to baseline after single doses of IV diuretic, >3 pillow orthopnea, Rales/crackles on pulmonary exam, and Pulmonary edema/pleural effusion on CXR. Latest ECHO performed and demonstrates 55% EF  Continue Furosemide and monitor clinical status closely. Monitor on telemetry. Patient is on CHF pathway.  Monitor strict Is&Os and daily weights.  Place on fluid restriction of 1.5 L. Cardiology has been consulted. Continue to stress to patient importance of self efficacy and  on diet for CHF. Last BNP reviewed- and noted below   Recent Labs   Lab 07/20/24  0356   *     -Cardiology following   -ECHO: Ejection fraction 55%   -Planned Nuc stress test cancelled per cardiology   -IV lasix 40mg q12h   -room air  -Continue PT/OT to eval and treat; SNF placement pending  -Cardiology consulted Electrophysiology Ayde Dubon NP plan for CRT Pacemaker on Wednesday 07/24/24   - repeat BNP in am

## 2024-07-22 NOTE — PLAN OF CARE
Nutrition Recommendations 7/22/24:  1. Recommend continue with cardiac diet   2. Encourage PO intake   3.Weigh twice weekly  4. Collaboration by nutrition professional with other providers     Goals:   1. Pt will consume >75% EEN and EPN prior to RD f/u    Sasha Arevalo - Dietetic Intern

## 2024-07-22 NOTE — PLAN OF CARE
07/22/24 1742   Rounds   Attendance Provider;Nurse ;Charge nurse   Discharge Plan A Skilled Nursing Facility   Why the patient remains in the hospital Requires continued medical care   Transition of Care Barriers None

## 2024-07-22 NOTE — SUBJECTIVE & OBJECTIVE
Interval History: Continue IV diuresis. Plan for CRT P this week once CHF compensated. Repeat BNP in AM.     Review of Systems   Constitutional: Positive for malaise/fatigue.   HENT:  Negative for hearing loss and hoarse voice.    Eyes:  Negative for blurred vision and visual disturbance.   Cardiovascular:  Positive for dyspnea on exertion. Negative for chest pain, claudication, irregular heartbeat, leg swelling, near-syncope, orthopnea, palpitations, paroxysmal nocturnal dyspnea and syncope.   Respiratory:  Negative for cough, hemoptysis, shortness of breath, sleep disturbances due to breathing, snoring and wheezing.    Endocrine: Negative for cold intolerance and heat intolerance.   Hematologic/Lymphatic: Does not bruise/bleed easily.   Skin:  Negative for color change, dry skin and nail changes.   Musculoskeletal:  Positive for arthritis and back pain. Negative for joint pain and myalgias.   Gastrointestinal:  Negative for bloating, abdominal pain, constipation, nausea and vomiting.   Genitourinary:  Negative for dysuria, flank pain, hematuria and hesitancy.   Neurological:  Negative for headaches, light-headedness, loss of balance, numbness, paresthesias and weakness.   Psychiatric/Behavioral:  Negative for altered mental status.    Allergic/Immunologic: Negative for environmental allergies.     Objective:     Vital Signs (Most Recent):  Temp: 97.9 °F (36.6 °C) (07/22/24 0804)  Pulse: (!) 43 (07/22/24 0832)  Resp: 17 (07/22/24 0804)  BP: (!) 139/57 (07/22/24 0804)  SpO2: (!) 93 % (07/22/24 0804) Vital Signs (24h Range):  Temp:  [97.6 °F (36.4 °C)-98.1 °F (36.7 °C)] 97.9 °F (36.6 °C)  Pulse:  [41-56] 43  Resp:  [17-21] 17  SpO2:  [93 %-99 %] 93 %  BP: (139-179)/(57-74) 139/57     Weight: 63.1 kg (139 lb 1.8 oz)  Body mass index is 19.96 kg/m².     SpO2: (!) 93 %       No intake or output data in the 24 hours ending 07/22/24 1105    Lines/Drains/Airways       Peripheral Intravenous Line  Duration                   Peripheral IV - Single Lumen 07/20/24 1910 22 G Left Hand 1 day                       Physical Exam  Vitals and nursing note reviewed.   Constitutional:       General: She is not in acute distress.     Appearance: Normal appearance. She is well-developed. She is not ill-appearing.   HENT:      Head: Normocephalic and atraumatic.      Nose: Nose normal.      Mouth/Throat:      Mouth: Mucous membranes are moist.   Eyes:      Pupils: Pupils are equal, round, and reactive to light.   Neck:      Thyroid: No thyromegaly.      Vascular: No JVD.      Trachea: No tracheal deviation.   Cardiovascular:      Rate and Rhythm: Regular rhythm. Bradycardia present.      Chest Wall: PMI is not displaced.      Pulses: Intact distal pulses.           Radial pulses are 2+ on the right side and 2+ on the left side.        Dorsalis pedis pulses are 2+ on the right side and 2+ on the left side.      Heart sounds: S1 normal and S2 normal. Heart sounds not distant. No murmur heard.     Comments: 2:1 AV block. HR 40s  Pulmonary:      Effort: Pulmonary effort is normal. No respiratory distress.      Breath sounds: Examination of the right-lower field reveals decreased breath sounds. Examination of the left-lower field reveals decreased breath sounds. Decreased breath sounds present. No wheezing.   Abdominal:      General: Bowel sounds are normal. There is no distension.      Palpations: Abdomen is soft.      Tenderness: There is no abdominal tenderness.   Musculoskeletal:         General: No swelling. Normal range of motion.      Cervical back: Full passive range of motion without pain, normal range of motion and neck supple.      Right lower leg: No edema.      Left lower leg: No edema.      Right ankle: No swelling.      Left ankle: No swelling.   Skin:     General: Skin is warm and dry.      Capillary Refill: Capillary refill takes less than 2 seconds.      Nails: There is no clubbing.   Neurological:      General: No focal deficit  "present.      Mental Status: She is alert and oriented to person, place, and time.      Motor: No weakness.   Psychiatric:         Speech: Speech normal.         Behavior: Behavior normal.         Thought Content: Thought content normal.         Judgment: Judgment normal.            Significant Labs: CMP   Recent Labs   Lab 07/21/24  0346 07/22/24  0347    140  140   K 3.5 4.3  4.3    103  103   CO2 24 22*  22*   GLU 98 90  90   BUN 34* 45*  45*   CREATININE 0.9 1.2  1.2   CALCIUM 8.8 9.1  9.1   PROT  --  6.0   ALBUMIN  --  3.2*   BILITOT  --  0.8   ALKPHOS  --  83   AST  --  27   ALT  --  22   ANIONGAP 11 15  15   , CBC   Recent Labs   Lab 07/22/24 0347   WBC 4.58   HGB 11.2*   HCT 33.7*      , Troponin No results for input(s): "TROPONINI" in the last 48 hours., and All pertinent lab results from the last 24 hours have been reviewed.    Significant Imaging: Echocardiogram: Transthoracic echo (TTE) complete ([facility] Only):   Results for orders placed or performed during the hospital encounter of 07/13/24   Echo   Result Value Ref Range    BSA 1.86 m2    LVIDd 4.20 3.5 - 6.0 cm    LV Systolic Volume 47.52 mL    LV Systolic Volume Index 25.4 mL/m2    LVIDs 3.40 2.1 - 4.0 cm    LV Diastolic Volume 78.48 mL    LV Diastolic Volume Index 41.97 mL/m2    Left Ventricular End Systolic Volume by Teichholz Method 47.52 mL    Left Ventricular End Diastolic Volume by Teichholz Method 78.48 mL    IVS 1.53 (A) 0.6 - 1.1 cm    LVOT diameter 2.07 cm    LVOT area 3.4 cm2    FS 19 (A) 28 - 44 %    Left Ventricle Relative Wall Thickness 0.70 cm    Posterior Wall 1.47 (A) 0.6 - 1.1 cm    LV mass 249.50 g    LV Mass Index 133 g/m2    MV Peak E Bobby 1.00 m/s    TDI LATERAL 0.09 m/s    TDI SEPTAL 0.11 m/s    E/E' ratio 10.00 m/s    MV Peak A Bobby 0.83 m/s    TR Max Bobby 3.24 m/s    E/A ratio 1.20     IVRT 87.54 msec    E wave deceleration time 256.49 msec    LV SEPTAL E/E' RATIO 9.09 m/s    LV LATERAL E/E' RATIO " 11.11 m/s    LA size 4.89 cm    Left Atrium Minor Axis 6.92 cm    Left Atrium Major Axis 7.90 cm    RA Major Axis 6.58 cm    AV regurgitation pressure 1/2 time [id number] ms    AR Max Bobby 4.16 m/s    AV mean gradient 11 mmHg    AV peak gradient 20 mmHg    Ao peak bobby 2.25 m/s    Ao VTI 59.70 cm    MV stenosis pressure 1/2 time 74.38 ms    MV valve area p 1/2 method 2.96 cm2    Triscuspid Valve Regurgitation Peak Gradient 42 mmHg    PV PEAK VELOCITY 1.41 m/s    PV peak gradient 8 mmHg    Pulmonary Valve Mean Velocity 0.91 m/s    STJ 3.00 cm    IVC diameter 1.08 cm    Mean e' 0.10 m/s    ZLVIDS 0.44     ZLVIDD -2.16     EF 55 %    TV resting pulmonary artery pressure 45 mmHg    RV TB RVSP 6 mmHg    Est. RA pres 3 mmHg    Narrative      Left Ventricle: The left ventricle is normal in size. Normal wall   thickness. There is concentric hypertrophy. There is normal systolic   function. Ejection fraction by visual approximation is 55%.    Right Ventricle: Normal right ventricular cavity size. Wall thickness   is normal. Systolic function is normal.    Aortic Valve: There is mild aortic regurgitation with a centrally   directed jet.    Pulmonary Artery: The estimated pulmonary artery systolic pressure is   45 mmHg.    IVC/SVC: Normal venous pressure at 3 mmHg.

## 2024-07-22 NOTE — PT/OT/SLP PROGRESS
Occupational Therapy   Treatment    Name: Kaleigh Delgado  MRN: [id number]  Admitting Diagnosis:  CHF (congestive heart failure)       Recommendations:     Discharge Recommendations: Moderate Intensity Therapy  Discharge Equipment Recommendations:  to be determined by next level of care  Barriers to discharge:  Decreased caregiver support    Assessment:     Kaleigh Delgado is a 74 y.o. female with a medical diagnosis of CHF (congestive heart failure).  She presents with the following performance deficits affecting function are weakness, impaired endurance, impaired self care skills, impaired functional mobility, gait instability, impaired balance, decreased lower extremity function, decreased upper extremity function, decreased safety awareness, pain, decreased ROM, impaired cardiopulmonary response to activity.     Rehab Prognosis:  Good; patient would benefit from acute skilled OT services to address these deficits and reach maximum level of function.       Plan:     Patient to be seen 2 x/week to address the above listed problems via self-care/home management, therapeutic activities, therapeutic exercises  Plan of Care Expires: 07/31/24  Plan of Care Reviewed with: patient    Subjective     Chief Complaint: weakness  Patient/Family Comments/goals: improve strength and mobility  Pain/Comfort:  Pain Rating 1: 3/10 (with movement)  Location - Side 1: Right  Location - Orientation 1: generalized  Location 1: knee  Pain Addressed 1: Reposition, Distraction  Pain Rating Post-Intervention 1: 3/10    Objective:     Communicated with: Nurse and epic chart review prior to session.  Patient found  standing at sink  with peripheral IV upon OT entry to room.    General Precautions: Standard, fall    Orthopedic Precautions:N/A  Braces: N/A  Respiratory Status: Room air    Functional Mobility/Transfers:  Patient completed Stand > Sit Transfer with contact guard assistance  with  rolling walker   Patient completed Bed <> Chair Transfer  using Stand Pivot technique with contact guard assistance with rolling walker  Functional Mobility: Patient completed x200ft functional mobility with CGA and RW to increase dynamic standing balance and activity tolerance needed for ADL completion. Required multiple short standing rest breaks; pt fatiguing quickly.     Activities of Daily Living:  Grooming: stand by assistance washed face and performed oral care while standing at sink  Upper Body Dressing: contact guard assistance yeimy gown around back while standing  Lower Body Dressing: stand by assistance pt adjusting socks while sitting EOB    AMPAC 6 Click ADL: 18    Treatment & Education:  Pt performed x10 reps BUE AROM therex in chair:  Elbow flexion/ext  Wrist flexion/ext  Digit flexion/ext  Chest press with scapular retractions  Educated pt on pursed lip breathing technique and importance of activity pacing for SOB. Patient educated on role of OT in acute setting and benefits of participation. Educated on techniques to use to increase independence and decrease fall risk with functional transfers. Educated on importance of OOB activity and calling for A to transfer back to bed. Encouraged completion of B UE AROM therex throughout the day to tolerance to increase functional strength and activity tolerance. Educated patient on importance of increased tolerance to upright position and direct impact on CV endurance and strength. Patient encouraged to sit up in chair for a minimum of 2 consecutive hours per day. Patient stated understanding and in agreement with POC.     Patient left up in chair with all lines intact, call button in reach, chair alarm on, and visitor present    GOALS:   Multidisciplinary Problems       Occupational Therapy Goals          Problem: Occupational Therapy    Goal Priority Disciplines Outcome Interventions   Occupational Therapy Goal     OT, PT/OT Progressing    Description: Goals to be met by: 7/31/24     Patient will increase  functional independence with ADLs by performing:    UE Dressing with Modified [place].  Grooming while standing at sink with Modified [place].  Toileting from toilet with Modified [place] for hygiene and clothing management.   Toilet transfer to toilet with Modified [place] with RW.  Upper extremity exercise program x15 reps per handout, with independence.                         Time Tracking:     OT Date of Treatment: 07/22/24  OT Start Time: 1050  OT Stop Time: 1115  OT Total Time (min): 25 min    Billable Minutes:Therapeutic Activity 15  Therapeutic Exercise 10    OT/MEGHAN: UMU Smith OT     7/22/2024

## 2024-07-22 NOTE — PROGRESS NOTES
[facility]  [facility] Medicine  Progress Note    Patient Name: Kaleigh Delgado  MRN: [id number]  Patient Class: IP- Inpatient   Admission Date: 7/13/2024  Length of Stay: 9 days  Attending Physician: Zaina Lombardi,*  Primary Care Provider: Aldair Kaur MD        Subjective:     Principal Problem:CHF (congestive heart failure)        HPI:  Kaleigh Delgado is a 74 y.o. female with a PMH  has no past medical history on file. who presented to the ED for further evaluation of worsening dyspnea/shortness of breath and bilateral leg swelling x2 days duration.  Patient denies prior history of CHF and is not currently on home O2 or diuretics.  Given worsening symptoms, patient called EMS and was found to be hypoxic with O2 saturation 88% on room air and was initiated on supplemental oxygen at 6 L via nasal cannula but was titrated up to 15 L non-rebreather.  Patient reported no known alleviating factors, and aggravating factors including laying flat and with exertion.  She denied endorsing any lightheadedness, dizziness, headache, visual changes, fever, chills, sweats, nausea, vomiting, chest pain, abdominal pain, dysuria, hematuria, melena, hematochezia, diarrhea, or onset neurological deficits.  Prior to onset of symptoms, patient reported being in her usual state of health with no other concerns or complaints.  All other review of systems negative except as noted above.  Initial workup in the ED revealed patient to be tachypneic and hypoxic with elevated D-dimer measuring 1.30.  BNP 03/20/2067, troponin 0.090, flu/COVID negative, UA negative for UTI. CTA positive for mild to moderate pulmonary edema, mild left pleural effusion, small right pleural effusion, but negative for pulmonary embolus.  Patient admitted to Hospital Medicine inpatient for continued medical management and workup of new onset heart failure.    PCP: No, Primary Doctor      Overview/Hospital Course:  74 y.o. female with no past medical  history on file admitted for new onset of CHF. Patient denies history of CHF or home oxygen use. Patient is currently for Hctz 12.5 mg daily for blood pressure management. Ed work up revealed elevated D-dimer measuring 1.30. BNP 2367, troponin 0.090, flu/COVID negative, UA negative for UTI. CTA positive for mild to moderate pulmonary edema, mild left pleural effusion, small right pleural effusion, but negative for pulmonary embolus. CXR showed cardiomegaly with perihilar edema. Correlate clinically to CHF. Trace pleural effusions. Correlate clinically to CHF. Echo resulted with a 55% EF. Cardiology following.    Patient remains bradycardic with elevated BP. PRN IV hydralazine ordered. Cardiology following. Reported improvement in SOB with IV lasix, will continue per cardiology.  Nuclear stress test cancelled per cardiology due to AV block , LBBB/RBBB   PT/OT to eval and treat- evaluated and reported patient would benefit from SNF. Placement pending CRT pacemaker. Cardiology consulted Electrophysiology Ayde Dubon NP plan for CRT Pacemaker on Wednesday 07/22/24. Follow-up labs in am.    Interval History: Denies dizziness, chest pain, or lightheadedness. Ambulating in room. Reported SOB improvement at rest, worse with exertion. On RA. Afebrile. Tolerating diet. Patient updated on CRT for Wednesday.     Review of Systems   Constitutional:  Positive for activity change (improvement), appetite change (improvement) and fatigue (with exertion). Negative for chills and fever.   HENT:  Negative for congestion, facial swelling, nosebleeds, rhinorrhea, sinus pressure, sinus pain, sneezing, sore throat and trouble swallowing.    Eyes:  Negative for photophobia and visual disturbance.   Respiratory:  Positive for cough. Negative for choking, chest tightness, shortness of breath and wheezing.    Cardiovascular:  Negative for chest pain, palpitations and leg swelling.   Gastrointestinal:  Negative for abdominal distention,  abdominal pain, constipation, diarrhea, nausea and vomiting.   Endocrine: Negative for cold intolerance and heat intolerance.   Genitourinary:  Negative for difficulty urinating, dysuria, flank pain, frequency, hematuria and urgency.   Musculoskeletal:  Positive for arthralgias, back pain and myalgias. Negative for gait problem and joint swelling.   Skin:  Positive for color change (BLE discoloration). Negative for pallor, rash and wound.   Allergic/Immunologic: Negative for environmental allergies, food allergies and immunocompromised state.   Neurological:  Positive for weakness (generalized). Negative for dizziness, seizures, syncope, speech difficulty, numbness and headaches.   Hematological:  Bruises/bleeds easily.   Psychiatric/Behavioral:  Negative for agitation and confusion. The patient is not nervous/anxious.      Objective:     Vital Signs (Most Recent):  Temp: 98.1 °F (36.7 °C) (07/22/24 1647)  Pulse: (!) 40 (07/22/24 1647)  Resp: 20 (07/22/24 1647)  BP: (!) 135/56 (07/22/24 1647)  SpO2: 96 % (07/22/24 1647) Vital Signs (24h Range):  Temp:  [97.6 °F (36.4 °C)-98.2 °F (36.8 °C)] 98.1 °F (36.7 °C)  Pulse:  [40-48] 40  Resp:  [17-20] 20  SpO2:  [93 %-99 %] 96 %  BP: (135-179)/(56-74) 135/56     Weight: 63.1 kg (139 lb 1.8 oz)  Body mass index is 19.96 kg/m².  No intake or output data in the 24 hours ending 07/22/24 1709      Physical Exam  Constitutional:       General: She is not in acute distress.     Appearance: Normal appearance. She is not ill-appearing or toxic-appearing.   HENT:      Head: Normocephalic and atraumatic.      Right Ear: External ear normal.      Left Ear: External ear normal.      Nose: Nose normal. No congestion or rhinorrhea.      Mouth/Throat:      Mouth: Mucous membranes are moist.      Pharynx: Oropharynx is clear. No oropharyngeal exudate or posterior oropharyngeal erythema.   Eyes:      Extraocular Movements: Extraocular movements intact.      Conjunctiva/sclera: Conjunctivae  normal.      Pupils: Pupils are equal, round, and reactive to light.   Neck:      Vascular: No carotid bruit.   Cardiovascular:      Rate and Rhythm: Regular rhythm. Bradycardia present.      Pulses: Normal pulses.      Heart sounds: Normal heart sounds. No murmur heard.     No friction rub. No gallop.   Pulmonary:      Effort: Pulmonary effort is normal. No respiratory distress.      Breath sounds: Normal breath sounds. No wheezing, rhonchi or rales.      Comments: Diminished bases bilaterally  Abdominal:      General: Abdomen is flat. Bowel sounds are normal. There is no distension.      Palpations: Abdomen is soft.      Tenderness: There is no abdominal tenderness. There is no guarding or rebound.   Musculoskeletal:         General: No swelling. Normal range of motion.      Cervical back: Normal range of motion and neck supple. No tenderness.      Right lower leg: No edema.      Left lower leg: No edema.   Skin:     General: Skin is warm.      Capillary Refill: Capillary refill takes less than 2 seconds.      Coloration: Skin is not pale.      Findings: Bruising present. No erythema or rash.      Comments: Chronic venous stasis bilaterally  Neurological:      General: No focal deficit present.      Mental Status: She is alert and oriented to person, place, and time.      Sensory: No sensory deficit.      Motor: Weakness (generalized) present.      Coordination: Coordination normal.      Gait: Gait normal.   Psychiatric:         Mood and Affect: Mood normal.         Behavior: Behavior normal.         Thought Content: Thought content normal.         Judgment: Judgment normal.             Significant Labs: All pertinent labs within the past 24 hours have been reviewed.    Significant Imaging: I have reviewed all pertinent imaging results/findings within the past 24 hours.    Assessment/Plan:      * CHF (congestive heart failure)  Patient is identified as having  evidence of new onset  heart failure that is Acute.  CHF is currently uncontrolled due to Continued edema of extremities, Dyspnea not returned to baseline after single doses of IV diuretic, >3 pillow orthopnea, Rales/crackles on pulmonary exam, and Pulmonary edema/pleural effusion on CXR. Latest ECHO performed and demonstrates 55% EF  Continue Furosemide and monitor clinical status closely. Monitor on telemetry. Patient is on CHF pathway.  Monitor strict Is&Os and daily weights.  Place on fluid restriction of 1.5 L. Cardiology has been consulted. Continue to stress to patient importance of self efficacy and  on diet for CHF. Last BNP reviewed- and noted below   Recent Labs   Lab 07/20/24  0356   *     -Cardiology following   -ECHO: Ejection fraction 55%   -Planned Nuc stress test cancelled per cardiology   -IV lasix 40mg q12h   -room air  -Continue PT/OT to eval and treat; SNF placement pending  -Cardiology consulted Electrophysiology Ayde Dubon NP plan for CRT Pacemaker on Wednesday 07/24/24   - repeat BNP in am    Acute hypoxemic respiratory failure  Patient with Hypoxic Respiratory failure which is Acute.  she is not on home oxygen. Supplemental oxygen was provided and noted-      Signs/symptoms of respiratory failure include- tachypnea, increased work of breathing, and respiratory distress. Contributing diagnoses includes - CHF, Pleural effusion, Pneumonia, and Pulmonary Embolus Labs and images were reviewed. Patient Has not had a recent ABG. Will treat underlying causes and adjust management of respiratory failure as follows:  Plan:  -Improving  -Continue treatment and workup of CHF  -Titrate oxygen requirements as needed  -Incentive spirometry   -Monitor pulse oximetry  -Duonebs prn      SOB (shortness of breath)  - improving   - IV lasix  - supplemental oxygen as needed   - CXR: Diminishing vascular congestion  - CTA: Cardiomegaly. Mild moderate pulmonary edema. Mild left-sided pleural effusion. Small right-sided pleural effusion. Correlate  clinically to CHF. No evidence for pulmonary emboli. Atypical infectious process not excluded.     Hypertension  Chronic, uncontrolled. Latest blood pressure and vitals reviewed-     Temp:  [97.6 °F (36.4 °C)-98.2 °F (36.8 °C)]   Pulse:  [40-48]   Resp:  [17-20]   BP: (135-179)/(56-74)   SpO2:  [93 %-99 %] .   Home meds for hypertension were reviewed and noted below.   Hypertension Medications               hydroCHLOROthiazide (MICROZIDE) 12.5 mg capsule Take 1 capsule by mouth every morning.     While in the hospital, will manage blood pressure as follows; Continue home antihypertensive regimen  - added hydralazine 50 mg q8h  - added losartan 50 mg daily     Will utilize p.r.n. blood pressure medication only if patient's blood pressure greater than 160/100 and she develops symptoms such as worsening chest pain or shortness of breath.      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)    Avoid AV giacomo agents given bradycardia  -Continue IV lasix 40 mg BID, Losartan  -Dash diet 2 gm sodium restriction  -1500 ml fluid restriction  -Follow-up BNP in AM    Elevated troponin  Cardiology consulted   Trop 0.090>0.309> 0.203   IV diuresis   BNP down trending to 583      Bradycardia  -HR 40s with PVCs, asymptomatic  -Cardiology following   -BNP down trending to 583  -Echo yesterday EF 55%  -Avoid AV giacomo blocking agents  -not a candidate for nuclear stress test due to heart block  -Cardiology consulted Electrophysiology Ayde Dubon NP plan for CRT Pacemaker 07/24/24      VTE Risk Mitigation (From admission, onward)           Ordered     enoxaparin injection 40 mg  Daily         07/13/24 2334     IP VTE HIGH RISK PATIENT  Once         07/13/24 2334     Place sequential compression device  Until discontinued         07/13/24 2334                    Discharge Planning   DUTCH:      Code Status: Full Code   Is the patient medically ready for discharge?:     Reason for patient still in hospital (select all that  apply): Patient trending condition  Discharge Plan A: Skilled Nursing Facility   Discharge Delays: None known at this time              FLYNN Bajwa  Department of Hospital Medicine   [facility]

## 2024-07-22 NOTE — ASSESSMENT & PLAN NOTE
-HR 40s with PVCs, asymptomatic  -Cardiology following   -BNP down trending to 583  -Echo yesterday EF 55%  -Avoid AV giacomo blocking agents  -not a candidate for nuclear stress test due to heart block  -Cardiology consulted Electrophysiology Ayde Dubon NP plan for CRT Pacemaker 07/24/24

## 2024-07-22 NOTE — ASSESSMENT & PLAN NOTE
Patient with Hypoxic Respiratory failure which is Acute.  she is not on home oxygen. Supplemental oxygen was provided and noted-      Signs/symptoms of respiratory failure include- tachypnea, increased work of breathing, and respiratory distress. Contributing diagnoses includes - CHF, Pleural effusion, Pneumonia, and Pulmonary Embolus Labs and images were reviewed. Patient Has not had a recent ABG. Will treat underlying causes and adjust management of respiratory failure as follows:  Plan:  -Improving  -Continue treatment and workup of CHF  -Titrate oxygen requirements as needed  -Incentive spirometry   -Monitor pulse oximetry  -Duonebs prn

## 2024-07-22 NOTE — ASSESSMENT & PLAN NOTE
BNP 2367, received IV lasix x4. On nasal cannula  GDMT ARB, not on BB given bradycardia  Kidney function stable  Repeat BNP pending  Echo yesterday EF 55%  Nuc stress tomorrow  F/u in [facility]    7/16/24  Hold off on nuc stress today, diurese  BNP elevated  IV diuresis cont ARB  Strict I/Os monitor renal function    7/17/24  Cont IV diuresis, ARB    7/18/24  -Improving, BNP trending down  -Continue IV diuresis for additional day  -Continue ARB    07/20/2024  Continue lasix 40 mg ivp bid    07/21/2024  Continue lasix     7/22/2024  -Avoid AV giacomo agents given bradycardia  -Continue IV lasix 40 mg BID, Losartan  -Dash diet 2 gm sodium restriction  -1500 ml fluid restriction  -Update BNP in AM

## 2024-07-22 NOTE — ASSESSMENT & PLAN NOTE
Avoid AV giacomo agents given bradycardia  -Continue IV lasix 40 mg BID, Losartan  -Dash diet 2 gm sodium restriction  -1500 ml fluid restriction  -Follow-up BNP in AM

## 2024-07-22 NOTE — ASSESSMENT & PLAN NOTE
Chronic, uncontrolled. Latest blood pressure and vitals reviewed-     Temp:  [97.6 °F (36.4 °C)-98.2 °F (36.8 °C)]   Pulse:  [40-48]   Resp:  [17-20]   BP: (135-179)/(56-74)   SpO2:  [93 %-99 %] .   Home meds for hypertension were reviewed and noted below.   Hypertension Medications               hydroCHLOROthiazide (MICROZIDE) 12.5 mg capsule Take 1 capsule by mouth every morning.     While in the hospital, will manage blood pressure as follows; Continue home antihypertensive regimen  - added hydralazine 50 mg q8h  - added losartan 50 mg daily     Will utilize p.r.n. blood pressure medication only if patient's blood pressure greater than 160/100 and she develops symptoms such as worsening chest pain or shortness of breath.

## 2024-07-22 NOTE — SUBJECTIVE & OBJECTIVE
Interval History: Denies dizziness, chest pain, or lightheadedness. Ambulating in room. Reported SOB improvement at rest, worse with exertion. On RA. Afebrile. Tolerating diet. Patient updated on CRT for Wednesday.     Review of Systems   Constitutional:  Positive for activity change (improvement), appetite change (improvement) and fatigue (with exertion). Negative for chills and fever.   HENT:  Negative for congestion, facial swelling, nosebleeds, rhinorrhea, sinus pressure, sinus pain, sneezing, sore throat and trouble swallowing.    Eyes:  Negative for photophobia and visual disturbance.   Respiratory:  Positive for cough. Negative for choking, chest tightness, shortness of breath and wheezing.    Cardiovascular:  Negative for chest pain, palpitations and leg swelling.   Gastrointestinal:  Negative for abdominal distention, abdominal pain, constipation, diarrhea, nausea and vomiting.   Endocrine: Negative for cold intolerance and heat intolerance.   Genitourinary:  Negative for difficulty urinating, dysuria, flank pain, frequency, hematuria and urgency.   Musculoskeletal:  Positive for arthralgias, back pain and myalgias. Negative for gait problem and joint swelling.   Skin:  Negative for color change, pallor, rash and wound.   Allergic/Immunologic: Negative for environmental allergies, food allergies and immunocompromised state.   Neurological:  Positive for weakness (generalized). Negative for dizziness, seizures, syncope, speech difficulty, numbness and headaches.   Hematological:  Bruises/bleeds easily.   Psychiatric/Behavioral:  Negative for agitation and confusion. The patient is not nervous/anxious.      Objective:     Vital Signs (Most Recent):  Temp: 98.1 °F (36.7 °C) (07/22/24 1647)  Pulse: (!) 40 (07/22/24 1647)  Resp: 20 (07/22/24 1647)  BP: (!) 135/56 (07/22/24 1647)  SpO2: 96 % (07/22/24 1647) Vital Signs (24h Range):  Temp:  [97.6 °F (36.4 °C)-98.2 °F (36.8 °C)] 98.1 °F (36.7 °C)  Pulse:  [40-48]  40  Resp:  [17-20] 20  SpO2:  [93 %-99 %] 96 %  BP: (135-179)/(56-74) 135/56     Weight: 63.1 kg (139 lb 1.8 oz)  Body mass index is 19.96 kg/m².  No intake or output data in the 24 hours ending 07/22/24 1709      Physical Exam  Constitutional:       General: She is not in acute distress.     Appearance: Normal appearance. She is not ill-appearing or toxic-appearing.   HENT:      Head: Normocephalic and atraumatic.      Right Ear: External ear normal.      Left Ear: External ear normal.      Nose: Nose normal. No congestion or rhinorrhea.      Mouth/Throat:      Mouth: Mucous membranes are moist.      Pharynx: Oropharynx is clear. No oropharyngeal exudate or posterior oropharyngeal erythema.   Eyes:      Extraocular Movements: Extraocular movements intact.      Conjunctiva/sclera: Conjunctivae normal.      Pupils: Pupils are equal, round, and reactive to light.   Neck:      Vascular: No carotid bruit.   Cardiovascular:      Rate and Rhythm: Regular rhythm. Bradycardia present.      Pulses: Normal pulses.      Heart sounds: Normal heart sounds. No murmur heard.     No friction rub. No gallop.   Pulmonary:      Effort: Pulmonary effort is normal. No respiratory distress.      Breath sounds: Normal breath sounds. No wheezing, rhonchi or rales.      Comments: Diminished bases bilaterally  Abdominal:      General: Abdomen is flat. Bowel sounds are normal. There is no distension.      Palpations: Abdomen is soft.      Tenderness: There is no abdominal tenderness. There is no guarding or rebound.   Musculoskeletal:         General: No swelling. Normal range of motion.      Cervical back: Normal range of motion and neck supple. No tenderness.      Right lower leg: No edema.      Left lower leg: No edema.   Skin:     General: Skin is warm.      Capillary Refill: Capillary refill takes less than 2 seconds.      Coloration: Skin is not pale.      Findings: Bruising present. No erythema or rash.   Neurological:      General: No  focal deficit present.      Mental Status: She is alert and oriented to person, place, and time.      Sensory: No sensory deficit.      Motor: Weakness (generalized) present.      Coordination: Coordination normal.      Gait: Gait normal.   Psychiatric:         Mood and Affect: Mood normal.         Behavior: Behavior normal.         Thought Content: Thought content normal.         Judgment: Judgment normal.             Significant Labs: All pertinent labs within the past 24 hours have been reviewed.    Significant Imaging: I have reviewed all pertinent imaging results/findings within the past 24 hours.

## 2024-07-22 NOTE — PT/OT/SLP PROGRESS
Physical Therapy  Treatment    Kaleigh Delgado   MRN: [id number]   Admitting Diagnosis: CHF (congestive heart failure)    PT Received On: 07/22/24  PT Start Time: 1050     PT Stop Time: 1115    PT Total Time (min): 25 min       Billable Minutes:  Gait Training 15 and Therapeutic Activity 10    Treatment Type: Treatment  PT/PTA: PTA     Number of PTA visits since last PT visit: 4       General Precautions: Standard, fall  Orthopedic Precautions: N/A  Braces: N/A  Respiratory Status: Room air    Spiritual, Cultural Beliefs, Confucianist Practices, Values that Affect Care: no    Subjective:  Communicated with patient's nurse, Rhoda, and completed Epic chart review prior to session.  Patient agreed to PT session.     Pain/Comfort  Pain Rating 1: 3/10  Location - Side 1: Right  Location 1: knee  Pain Addressed 1: Other (see comments) (ACTIVITY PACING)  Pain Rating Post-Intervention 1: 3/10    Objective:   Patient found with: peripheral IV    Patient found standing at sink brushing her teeth upon therapist entry into room.     200ft w/ RW CGA (multiple standing rest breaks)    Stand pivot T/F to chair w/ RW: CGA (multiple standing rest breaks throughout)    Completed x10 reps AROM TE to BLE: LAQ, Hip Flex, AP   Intermittent cues given as needed to maintain correct form during repetitions    Educated patient on importance of increased tolerance to upright position and direct impact on CV endurance and strength. Patient encouraged to sit up in chair/ EOB, for a minimum of 2 consecutive hours, 3x per day. Encouraged patient to perform AROM TE to BLE throughout the day within all available planes of motion. Also encouraged patient to request assistance from nursing staff to ambulate 1-2x more today in order to promote recovery of CV endurance and strength. Re enforced importance of utilizing call light to meet needs in room and not attempt to get up without staff assistance. Patient verbalized understanding and agreed to comply.        AM-PAC 6 CLICK MOBILITY  How much help from another person does this patient currently need?   1 = Unable, Total/Dependent Assistance  2 = A lot, Maximum/Moderate Assistance  3 = A little, Minimum/Contact Guard/Supervision  4 = None, Modified [place]/Independent    Turning over in bed (including adjusting bedclothes, sheets and blankets)?: 1 (NT)  Sitting down on and standing up from a chair with arms (e.g., wheelchair, bedside commode, etc.): 3  Moving from lying on back to sitting on the side of the bed?: 1 (NT)  Moving to and from a bed to a chair (including a wheelchair)?: 3  Need to walk in hospital room?: 3  Climbing 3-5 steps with a railing?: 1 (NT)  Basic Mobility Total Score: 12    AM-PAC Raw Score CMS G-Code Modifier Level of Impairment Assistance   6 % Total / Unable   7 - 9 CM 80 - 100% Maximal Assist   10 - 14 CL 60 - 80% Moderate Assist   15 - 19 CK 40 - 60% Moderate Assist   20 - 22 CJ 20 - 40% Minimal Assist   23 CI 1-20% SBA / CGA   24 CH 0% Independent/ Mod I     Patient left up in chair with call button in reach, chair alarm on, and visitor present.    Assessment:  Kaleigh Delgado is a 74 y.o. female with a medical diagnosis of CHF (congestive heart failure) and presents with overall decline in functional mobility. Patient would continue to benefit from skilled PT to address functional limitations listed below in order to return to PLOF/decrease caregiver burden.     Rehab identified problem list/impairments: weakness, impaired endurance, gait instability, impaired balance, decreased safety awareness, decreased ROM, impaired cardiopulmonary response to activity    Rehab potential is good.    Activity tolerance: Fair    Discharge recommendations: Moderate Intensity Therapy      Barriers to discharge:      Equipment recommendations: to be determined by next level of care     GOALS:   Multidisciplinary Problems       Physical Therapy Goals          Problem: Physical Therapy    Goal  Priority Disciplines Outcome Goal Variances Interventions   Physical Therapy Goal     PT, PT/OT      Description: Goals to be met by 7/31/24.  1. Pt will complete bed mobility SBA.  2. Pt will complete sit to stand SBA.  3. Pt will ambulate 200ft SBA using RW.  4. Pt will increase AMPAC score by 2 points to progress functional mobility.                       PLAN:    Patient to be seen 3 x/week to address the above listed problems via gait training, therapeutic activities, therapeutic exercises  Plan of Care expires: 07/31/24  Plan of Care reviewed with: patient         07/22/2024

## 2024-07-22 NOTE — MED STUDENT SUBJECTIVE & OBJECTIVE
Medical Student Subjective & Objective     Principal Problem: CHF (congestive heart failure)    Chief Complaint:   Chief Complaint   Patient presents with    Shortness of Breath     Pt with a hx of pulmonary edema and unspecified cardiac hx arrived in the ED via [facility] on 15L O2 via NRB with c/o SOB and BLE edema x last couple days. Pt reports swelling has worsened today. +4 pitting edema noted to BLE. Pt denies home O2, CHF, blood thinners, or diuretics. [facility] medic reports FD was the first on scene and pt's O2 saturation was in the 80s on RA.        HPI: Kaleigh Delgado is a 74 y.o. female with a PMH  has no past medical history on file. who presented to the ED for further evaluation of worsening dyspnea/shortness of breath and bilateral leg swelling x2 days duration.  Patient denies prior history of CHF and is not currently on home O2 or diuretics.  Given worsening symptoms, patient called EMS and was found to be hypoxic with O2 saturation 88% on room air and was initiated on supplemental oxygen at 6 L via nasal cannula but was titrated up to 15 L non-rebreather.  Patient reported no known alleviating factors, and aggravating factors including laying flat and with exertion.  She denied endorsing any lightheadedness, dizziness, headache, visual changes, fever, chills, sweats, nausea, vomiting, chest pain, abdominal pain, dysuria, hematuria, melena, hematochezia, diarrhea, or onset neurological deficits.  Prior to onset of symptoms, patient reported being in her usual state of health with no other concerns or complaints.  All other review of systems negative except as noted above.  Initial workup in the ED revealed patient to be tachypneic and hypoxic with elevated D-dimer measuring 1.30.  BNP 03/20/2067, troponin 0.090, flu/COVID negative, UA negative for UTI. CTA positive for mild to moderate pulmonary edema, mild left pleural effusion, small right pleural effusion, but negative for pulmonary embolus.  Patient  admitted to Hospital Medicine inpatient for continued medical management and workup of new onset heart failure.    PCP: Sharron, Primary Doctor      Hospital Course: 74 y.o. female with no past medical history on file admitted for new onset of CHF. Patient denies history of CHF or home oxygen use. Patient is currently for Hctz 12.5 mg daily for blood pressure management. Ed work up revealed elevated D-dimer measuring 1.30. BNP 2367, troponin 0.090, flu/COVID negative, UA negative for UT  CTA positive for mild to moderate pulmonary edema, mild left pleural effusion, small right pleural effusion, but negative for pulmonary embolus. CXR showed cardiomegaly with perihilar edema. Correlate clinically to CHF. Trace pleural effusions. Correlate clinically to CHF. Echo resulted with a 55% EF. Cardiology following.    Patient bradycardic, STAT EKG. Elevated BP. PRN IV hydralazine ordered. Reported increased SOB after hydralazine administered. Gave lasix 20 mg IV once, then 40 mg IV x2 doses. Started losartan 50 mg for BP control. STAT CXR and supplemental oxygen PRN.    Trend troponin, 0.090> 0.309 >0.203   Cardiology consulted  Nuclear stress test- cancelled per cardiology due to AV block , LBBB/RBBB   PT/OT to eval and treat- evaluated and reported patient would benefit from SNF. Patient agreeable to SNF placement,  consulted to aid in placement  IV lasix 40mg q12h per cardiology   Cardiology consulted Electrophysiology Ayde Dubon NP plan for CRT Pacemaker once CHF is controlled and patient more euvolemic   BNP down trending, now 583.      Interval History: 7/22/2024 - No acute events over night. Patient endorsees improvement of dyspnea over past three days. Ambulating without difficulty/SOB. Urinating/defecating without difficulty. Lower limb Edema improved bilaterally. Crackles/rales present in lungs bilaterally. No jugular venosus distension present.    Review of Systems  See HPI for full ROS*    Objective:      Vital Signs (Most Recent):  Temp: 97.9 °F (36.6 °C) (24 0804)  Pulse: (!) 43 (24 0832)  Resp: 17 (24 0804)  BP: (!) 139/57 (24 0804)  SpO2: (!) 93 % (24 0804) Vital Signs (24h Range):  Temp:  [97.6 °F (36.4 °C)-98.1 °F (36.7 °C)] 97.9 °F (36.6 °C)  Pulse:  [41-56] 43  Resp:  [17-21] 17  SpO2:  [93 %-99 %] 93 %  BP: (139-179)/(57-74) 139/57     Weight: 63.1 kg (139 lb 1.8 oz)  Body mass index is 19.96 kg/m².  No intake or output data in the 24 hours ending 24 1037   Physical Exam  Constitutional:       General: She is not in acute distress.     Appearance: She is not ill-appearing or diaphoretic.   HENT:      Head: Normocephalic and atraumatic.      Nose: Nose normal.      Mouth/Throat:      Mouth: Mucous membranes are moist.   Eyes:      Extraocular Movements: Extraocular movements intact.      Conjunctiva/sclera: Conjunctivae normal.      Pupils: Pupils are equal, round, and reactive to light.   Cardiovascular:      Rate and Rhythm: Bradycardia present.      Pulses: Normal pulses.      Heart sounds: Normal heart sounds. No murmur heard.     No friction rub. No gallop. No S3 or S4 sounds.      Comments: No JVD  Pulmonary:      Effort: Pulmonary effort is normal.      Breath sounds: Examination of the right-lower field reveals rales. Examination of the left-lower field reveals rales. Rales present.   Abdominal:      General: Abdomen is flat. Bowel sounds are normal. There is no distension.      Palpations: Abdomen is soft.      Tenderness: There is no abdominal tenderness. There is no guarding.   Musculoskeletal:         General: Normal range of motion.      Cervical back: Normal range of motion and neck supple.      Right lower le+ Edema present.      Left lower le+ Edema present.   Skin:     General: Skin is warm.      Capillary Refill: Capillary refill takes less than 2 seconds.   Neurological:      General: No focal deficit present.      Mental Status: She is  oriented to person, place, and time. Mental status is at baseline.   Psychiatric:         Mood and Affect: Mood normal.         Behavior: Behavior normal.         Thought Content: Thought content normal.         Judgment: Judgment normal.       Significant Labs: All pertinent labs within the past 24 hours have been reviewed.      Recent Labs   Lab 07/22/24  0347   CALCIUM 9.1  9.1   ALBUMIN 3.2*   PROT 6.0     140   K 4.3  4.3   CO2 22*  22*     103   BUN 45*  45*   CREATININE 1.2  1.2   ALKPHOS 83   ALT 22   AST 27   BILITOT 0.8       CBC:   Recent Labs   Lab 07/22/24  0347   WBC 4.58   HGB 11.2*   HCT 33.7*          Significant Imaging: I have reviewed all pertinent imaging results/findings within the past 24 hours.  I have reviewed and interpreted all pertinent imaging results/findings within the past 24 hours.    X-Ray Chest 1 View    Result Date: 7/14/2024  EXAMINATION:  XR CHEST 1 VIEW    CLINICAL HISTORY:  shortness of breath;    TECHNIQUE:  Single frontal portable view of the chest was performed.    COMPARISON:  Yesterday    FINDINGS:  Mediastinum rotated.  Pulmonary interstitial markings diminishing.  Minimal blunting of the costophrenic angles redemonstrated similar.      Echo    Result Date: 7/14/2024    Left Ventricle: The left ventricle is normal in size. Normal wall   thickness. There is concentric hypertrophy. There is normal systolic   function. Ejection fraction by visual approximation is 55%.    Right Ventricle: Normal right ventricular cavity size. Wall thickness   is normal. Systolic function is normal.    Aortic Valve: There is mild aortic regurgitation with a centrally   directed jet.    Pulmonary Artery: The estimated pulmonary artery systolic pressure is   45 mmHg.    IVC/SVC: Normal venous pressure at 3 mmHg.      CTA Chest Non-Coronary (PE Studies)    Result Date: 7/13/2024  EXAMINATION:  CTA CHEST NON CORONARY (PE STUDIES)    CLINICAL HISTORY:  Pulmonary embolism  (PE) suspected, positive D-dimer;    TECHNIQUE:  Low dose axial images, sagittal and coronal reformations were obtained from the thoracic inlet to the lung bases following the IV administration of 100 mL of Omnipaque 350.  Contrast timing was optimized to evaluate the pulmonary arteries.  MIP images were performed.    COMPARISON:  None    FINDINGS:  Mild left-sided pleural effusion.  Cardiomegaly.  No pulmonary emboli.  Subpleural ground-glass opacities.  Bronchial vascular thickening along the hilar regions.  Findings suggestive of pulmonary edema and CHF.  No evidence for dissection aneurysm.  Mild loculated right anterior pleural effusion.  Degenerative joint disease.  Main pulmonary artery is mildly enlarged.      X-Ray Chest AP Portable    Result Date: 7/13/2024  EXAMINATION:  XR CHEST AP PORTABLE    CLINICAL HISTORY:  SOB;    TECHNIQUE:  Single frontal view of the chest was performed.    COMPARISON:  None    FINDINGS:  Cardiomegaly with perihilar edema.  Correlate clinically to CHF.  Trace pleural effusions.  Correlate clinically to CHF.    Bones are intact.      Current Facility-Administered Medications:     acetaminophen tablet 650 mg, 650 mg, Oral, Q4H PRN, Brooke Narayan, FNP-C    albuterol-ipratropium 2.5 mg-0.5 mg/3 mL nebulizer solution 3 mL, 3 mL, Nebulization, Q6H PRN, Brooke Narayan, VARGHESEP-C    aluminum-magnesium hydroxide-simethicone 200-200-20 mg/5 mL suspension 30 mL, 30 mL, Oral, QID PRN, Brooke Narayan A, FNP-C    enoxaparin injection 40 mg, 40 mg, Subcutaneous, Daily, Donte Yang MD, 40 mg at 07/21/24 1756    furosemide injection 40 mg, 40 mg, Intravenous, Q12H, Yaa Salas NP, 40 mg at 07/22/24 0937    hydrALAZINE injection 10 mg, 10 mg, Intravenous, Q6H PRN, Brooke Narayan FNP-C, 10 mg at 07/22/24 0430    hydrALAZINE tablet 50 mg, 50 mg, Oral, Q8H, Hakan Aquino MD, 50 mg at 07/22/24 0603    HYDROcodone-acetaminophen 5-325 mg per tablet 1 tablet, 1 tablet, Oral, Q6H PRN, Brooke Narayan  A, FNP-C, 1 tablet at 07/16/24 0126    losartan tablet 50 mg, 50 mg, Oral, Daily, NarayanCharanjit kaiseru A, FNP-C, 50 mg at 07/22/24 0937    melatonin tablet 6 mg, 6 mg, Oral, Nightly PRN, Helene Charanjitu A, FNP-C    ondansetron injection 4 mg, 4 mg, Intravenous, Q8H PRN, Donte Yang MD    polyethylene glycol packet 17 g, 17 g, Oral, Daily, Donte Yang MD, 17 g at 07/22/24 0937    senna-docusate 8.6-50 mg per tablet 1 tablet, 1 tablet, Oral, BID, Donte Yang MD, 1 tablet at 07/22/24 0937    sodium chloride 0.9% flush 10 mL, 10 mL, Intravenous, PRN, Donte Yang MD      Assessment/Plan  .  Problem List as of 7/22/2024 Reviewed: 7/19/2024 12:47 PM by Yaa Salas NP         Pulmonary    Shortness of breath    Last Assessment & Plan 7/13/2024 Hospital Encounter Written 7/21/2024  1:50 PM by Sammi Mark NP     - IV lasix  - supplemental oxygen as needed   -CXR: Diminishing vascular congestion  -CTA: Cardiomegaly. Mild moderate pulmonary edema. Mild left-sided pleural effusion. Small right-sided pleural effusion. Correlate clinically to CHF. No evidence for pulmonary emboli. Atypical infectious process not excluded.          Acute hypoxemic respiratory failure    Last Assessment & Plan 7/13/2024 Hospital Encounter Written 7/21/2024  1:51 PM by Sammi Mark NP     Patient with Hypoxic Respiratory failure which is Acute.  she is not on home oxygen. Supplemental oxygen was provided and noted-      Signs/symptoms of respiratory failure include- tachypnea, increased work of breathing, and respiratory distress. Contributing diagnoses includes - CHF, Pleural effusion, Pneumonia, and Pulmonary Embolus Labs and images were reviewed. Patient Has not had a recent ABG. Will treat underlying causes and adjust management of respiratory failure as follows:  Plan:  -Continue treatment and workup of CHF  -Titrate oxygen requirements as needed  -Incentive spirometry   -Monitor pulse  oximetry  -Duonebs prn              Cardiac/Vascular    * (Principal) CHF (congestive heart failure)    Last Assessment & Plan 7/13/2024 Hospital Encounter Written 7/21/2024  2:28 PM by Hakan Aquino MD     BNP 2367, received IV lasix x4. On nasal cannula  GDMT ARB, not on BB given bradycardia  Kidney function stable  Repeat BNP pending  Echo yesterday EF 55%  Nuc stress tomorrow  F/u in [facility]    7/16/24  Hold off on nuc stress today, diurese  BNP elevated  IV diuresis cont ARB  Strict I/Os monitor renal function    7/17/24  Cont IV diuresis, ARB    7/18/24  -Improving, BNP trending down  -Continue IV diuresis for additional day  -Continue ARB    07/20/2024  Continue lasix 40 mg ivp bid    07/21/2024  Continue lasix     07/21/2024  Continue lasix 40 mg IV bid  -Continue ARB         Hypertension    Last Assessment & Plan 7/13/2024 Hospital Encounter Written 7/21/2024  1:51 PM by Sammi Mark NP     Chronic, uncontrolled. Latest blood pressure and vitals reviewed-     Temp:  [98 °F (36.7 °C)-98.4 °F (36.9 °C)]   Pulse:  [38-88]   Resp:  [17-20]   BP: (132-198)/(54-77)   SpO2:  [91 %-97 %] .   Home meds for hypertension were reviewed and noted below.   Hypertension Medications               hydroCHLOROthiazide (MICROZIDE) 12.5 mg capsule Take 1 capsule by mouth every morning.     While in the hospital, will manage blood pressure as follows; Continue home antihypertensive regimen    Will utilize p.r.n. blood pressure medication only if patient's blood pressure greater than 160/100 and she develops symptoms such as worsening chest pain or shortness of breath.           Bradycardia    Last Assessment & Plan 7/13/2024 Hospital Encounter Written 7/21/2024  2:29 PM by Hakan Aquino MD     HR 40s with PVCs, asymptomatic  Avoid AV giacomo blocking agents  F/u with primary cardiologist to monitor  Home med list with only HCTZ    7/16/24  EKG during stress today with int 2:1 AVB    7/17/24  EP consulted given int 2:1AVB, AV  disassociation   Appreciate recs    7/18/24  -EP on board, CRT-P planned once more euvolemic    07/20/2024  Remains ben and AV dissociation  CRT-P early next week per EP    07/21/2024  Continue CHF Rx  CRT-P early next week per EP     07/22/2024  Continue CHF Rx  CRT-P early this week per EP              Elevated troponin    Last Assessment & Plan 7/13/2024 Hospital Encounter Written 7/21/2024  1:52 PM by Sammi Mark NP     Cardiology consulted   Trop 0.090>0.309> 0.203   IV diuresis   BNP down trending to 583            Pickard Ham MS-3

## 2024-07-22 NOTE — ASSESSMENT & PLAN NOTE
- improving   - IV lasix  - supplemental oxygen as needed   - CXR: Diminishing vascular congestion  - CTA: Cardiomegaly. Mild moderate pulmonary edema. Mild left-sided pleural effusion. Small right-sided pleural effusion. Correlate clinically to CHF. No evidence for pulmonary emboli. Atypical infectious process not excluded.

## 2024-07-22 NOTE — PROGRESS NOTES
[facility]  Adult Nutrition  Progress Note    SUMMARY       Recommendations    Recommendation/Intervention:   1. Recommend continue with cardiac diet   2. Encourage PO intake   3.Weigh twice weekly    Goals:   1. Pt will consume >75% EEN and EPN prior to RD f/u  Nutrition Goal Status: new    Assessment and Plan    Nutrition Problem  Altered nutrition related lab values  Underweight    Related to (etiology):   Medical condition (TYSHAWN)  Inadequate energy intake    Signs and Symptoms (as evidenced by):   Elevated BUN, eGFR 48  BMI < 23 (older adult), Decreased muscle mass, Failure to gain or maintain appropriate weight     Interventions/Recommendations (treatment strategy):  1. Sodium and fat modified diet  2. Collaboration by nutrition professional with other providers    Nutrition Diagnosis Status:   New         Malnutrition Assessment     Skin (Micronutrient): dry (Maroon/purple, Abad score = 20)               Clavicle Bone Region (Muscle Loss): moderate depletion                 Reason for Assessment    Reason For Assessment: RD follow-up  Diagnosis: other (see comments) (CHF)  Relevant Medical History: CHF, HTN, Acute hypoxemic respiratory failure, Bradycardia, Elevated troponin  General Information Comments:   7/22/24: 73 y/o female admitted to the hospital for worsening SOB. Per H&P, pt denies prior hx of CHF. Prior to coming to the hospital, pt reported no concerns and has been in her usual state of health. 1+ edema noted bilateral legs and right ankle. Spoke with pt at bedside. Pt was sitting up on the side of bed at time of visit. Pt reported that her appetite has decreased a little bit, but overall it has been good. She stated that she thinks her appetite has decreased because she has not been as active since being in the hospital. Stated that she ate 75% of her breakfast this morning. Denies recent n/v and issues with c/s, but states that a few days ago she was having some nausea. Intake PTA  "included foods such as eggs, toast, yogurt, fruits, and vegetables. States that her and her  do not eat fast food, and that she tries to limit her sodium intake. Pt talked about current life stressors with her  being in a long term care facility. She states that she normally does not have issues with getting to the store, but with recent stressors it has been harder to prioritize grocery shopping. Discussed the importance of nutrition and pt states that she understands. Overall, no issues with appetite or consuming enough nutrients. Visual NFPE showed moderate losses in the clavicle.  Nutrition Discharge Planning: Cardiac diet    Nutrition Risk Screen    Nutrition Risk Screen: no indicators present    Nutrition Related Social Determinants of Health: SDOH: Adequate food in home environment and None Identified    Nutrition/Diet History    Spiritual, Cultural Beliefs, Adventist Practices, Values that Affect Care: no  Food Allergies: NKFA  Factors Affecting Nutritional Intake: None identified at this time    Anthropometrics    Temp: 97.9 °F (36.6 °C)  Height Method: Stated  Height: 5' 10" (177.8 cm)  Height (inches): 70 in  Weight Method: Bed Scale  Weight: 63.1 kg (139 lb 1.8 oz)  Weight (lb): 139.11 lb  Ideal Body Weight (IBW), Female: 150 lb  % Ideal Body Weight, Female (lb): 92.74 %  BMI (Calculated): 20  BMI Grade: 18.5-24.9 - normal     Wt Readings from Last 15 Encounters:   07/22/24 63.1 kg (139 lb 1.8 oz)   07/22/13 63.5 kg (140 lb)   05/07/12 69.1 kg (152 lb 5.1 oz)       Lab/Procedures/Meds    Pertinent Labs Reviewed: reviewed  Pertinent Labs Comments: BUN elevated, eGFR 48  Pertinent Medications Reviewed: reviewed  BMP  Lab Results   Component Value Date     07/22/2024     07/22/2024    K 4.3 07/22/2024    K 4.3 07/22/2024     07/22/2024     07/22/2024    CO2 22 (L) 07/22/2024    CO2 22 (L) 07/22/2024    BUN 45 (H) 07/22/2024    BUN 45 (H) 07/22/2024    CREATININE 1.2 " 07/22/2024    CREATININE 1.2 07/22/2024    CALCIUM 9.1 07/22/2024    CALCIUM 9.1 07/22/2024    ANIONGAP 15 07/22/2024    ANIONGAP 15 07/22/2024    EGFRNORACEVR 48 (A) 07/22/2024    EGFRNORACEVR 48 (A) 07/22/2024     Lab Results   Component Value Date    ALBUMIN 3.2 (L) 07/22/2024     Lab Results   Component Value Date    ALT 22 07/22/2024    AST 27 07/22/2024    ALKPHOS 83 07/22/2024    BILITOT 0.8 07/22/2024     Lab Results   Component Value Date    HGBA1C 5.3 07/14/2024     Lab Results   Component Value Date    WBC 4.58 07/22/2024    HGB 11.2 (L) 07/22/2024    HCT 33.7 (L) 07/22/2024    MCV 97 07/22/2024     07/22/2024       Scheduled Meds:   enoxparin  40 mg Subcutaneous Daily    furosemide (LASIX) injection  40 mg Intravenous Q12H    hydrALAZINE  50 mg Oral Q8H    losartan  50 mg Oral Daily    polyethylene glycol  17 g Oral Daily    senna-docusate 8.6-50 mg  1 tablet Oral BID     Continuous Infusions:  PRN Meds:.  Current Facility-Administered Medications:     acetaminophen, 650 mg, Oral, Q4H PRN    albuterol-ipratropium, 3 mL, Nebulization, Q6H PRN    aluminum-magnesium hydroxide-simethicone, 30 mL, Oral, QID PRN    hydrALAZINE, 10 mg, Intravenous, Q6H PRN    HYDROcodone-acetaminophen, 1 tablet, Oral, Q6H PRN    melatonin, 6 mg, Oral, Nightly PRN    ondansetron, 4 mg, Intravenous, Q8H PRN    sodium chloride 0.9%, 10 mL, Intravenous, PRN      Physical Findings/Assessment         Estimated/Assessed Needs    Weight Used For Calorie Calculations: 63.1 kg (139 lb 1.8 oz)  Energy Calorie Requirements (kcal): 0937-6569 (25-30 kcal/kg - TYSHAWN vs CHF vs underweight)  Energy Need Method: Kcal/kg  Protein Requirements: 50-63 g (0.8-1.0 g/kg - TYSHAWN)  Weight Used For Protein Calculations: 63.1 kg (139 lb 1.8 oz)  Fluid Requirements (mL): 500 mL + total output (TYSHAWN)  Estimated Fluid Requirement Method: other (see comments)  RDA Method (mL): 1577  CHO Requirement: 197-236 (2514-9835/8)      Nutrition Prescription  Ordered    Current Diet Order: Cardiac    Evaluation of Received Nutrient/Fluid Intake  I/O (net since admit):  7/22/24: -4143 mL    Energy Calories Required: meeting needs  Protein Required: meeting needs  Fluid Required: not meeting needs  Total Fluid Intake (mL): 480  Tolerance: tolerating  % Intake of Estimated Energy Needs: 75 - 100 %  % Meal Intake: 75 - 100 %    Nutrition Risk    Level of Risk/Frequency of Follow-up: low     Monitor and Evaluation    Food and Nutrient Intake: energy intake, food and beverage intake  Food and Nutrient Adminstration: diet order  Knowledge/Beliefs/Attitudes: food and nutrition knowledge/skill, beliefs and attitudes  Anthropometric Measurements: weight  Biochemical Data, Medical Tests and Procedures: electrolyte and renal panel, inflammatory profile     Nutrition Follow-Up    RD Follow-up?: Yes    Sasha Arevalo - Dietetic Intern

## 2024-07-23 PROBLEM — R10.32 LEFT GROIN PAIN: Status: ACTIVE | Noted: 2024-07-23

## 2024-07-23 LAB
ALBUMIN SERPL BCP-MCNC: 2.9 G/DL (ref 3.5–5.2)
ALP SERPL-CCNC: 72 U/L (ref 55–135)
ALT SERPL W/O P-5'-P-CCNC: 19 U/L (ref 10–44)
ANION GAP SERPL CALC-SCNC: 9 MMOL/L (ref 8–16)
ANION GAP SERPL CALC-SCNC: 9 MMOL/L (ref 8–16)
AST SERPL-CCNC: 20 U/L (ref 10–40)
BASOPHILS # BLD AUTO: 0.03 K/UL (ref 0–0.2)
BASOPHILS NFR BLD: 0.7 % (ref 0–1.9)
BILIRUB SERPL-MCNC: 0.6 MG/DL (ref 0.1–1)
BNP SERPL-MCNC: 463 PG/ML (ref 0–99)
BUN SERPL-MCNC: 46 MG/DL (ref 8–23)
BUN SERPL-MCNC: 46 MG/DL (ref 8–23)
CALCIUM SERPL-MCNC: 8.3 MG/DL (ref 8.7–10.5)
CALCIUM SERPL-MCNC: 8.3 MG/DL (ref 8.7–10.5)
CHLORIDE SERPL-SCNC: 104 MMOL/L (ref 95–110)
CHLORIDE SERPL-SCNC: 104 MMOL/L (ref 95–110)
CO2 SERPL-SCNC: 26 MMOL/L (ref 23–29)
CO2 SERPL-SCNC: 26 MMOL/L (ref 23–29)
CREAT SERPL-MCNC: 1.1 MG/DL (ref 0.5–1.4)
CREAT SERPL-MCNC: 1.1 MG/DL (ref 0.5–1.4)
DIFFERENTIAL METHOD BLD: ABNORMAL
EOSINOPHIL # BLD AUTO: 0.1 K/UL (ref 0–0.5)
EOSINOPHIL NFR BLD: 2.5 % (ref 0–8)
ERYTHROCYTE [DISTWIDTH] IN BLOOD BY AUTOMATED COUNT: 12.6 % (ref 11.5–14.5)
EST. GFR  (NO RACE VARIABLE): 53 ML/MIN/1.73 M^2
EST. GFR  (NO RACE VARIABLE): 53 ML/MIN/1.73 M^2
GLUCOSE SERPL-MCNC: 92 MG/DL (ref 70–110)
GLUCOSE SERPL-MCNC: 92 MG/DL (ref 70–110)
HCT VFR BLD AUTO: 31.2 % (ref 37–48.5)
HGB BLD-MCNC: 10.4 G/DL (ref 12–16)
IMM GRANULOCYTES # BLD AUTO: 0.01 K/UL (ref 0–0.04)
IMM GRANULOCYTES NFR BLD AUTO: 0.2 % (ref 0–0.5)
LYMPHOCYTES # BLD AUTO: 1.1 K/UL (ref 1–4.8)
LYMPHOCYTES NFR BLD: 25.4 % (ref 18–48)
MAGNESIUM SERPL-MCNC: 2 MG/DL (ref 1.6–2.6)
MCH RBC QN AUTO: 32.3 PG (ref 27–31)
MCHC RBC AUTO-ENTMCNC: 33.3 G/DL (ref 32–36)
MCV RBC AUTO: 97 FL (ref 82–98)
MONOCYTES # BLD AUTO: 0.5 K/UL (ref 0.3–1)
MONOCYTES NFR BLD: 11.7 % (ref 4–15)
NEUTROPHILS # BLD AUTO: 2.7 K/UL (ref 1.8–7.7)
NEUTROPHILS NFR BLD: 59.5 % (ref 38–73)
NRBC BLD-RTO: 0 /100 WBC
PLATELET # BLD AUTO: 169 K/UL (ref 150–450)
PMV BLD AUTO: 10.9 FL (ref 9.2–12.9)
POTASSIUM SERPL-SCNC: 3.6 MMOL/L (ref 3.5–5.1)
POTASSIUM SERPL-SCNC: 3.6 MMOL/L (ref 3.5–5.1)
PROT SERPL-MCNC: 4.9 G/DL (ref 6–8.4)
RBC # BLD AUTO: 3.22 M/UL (ref 4–5.4)
SODIUM SERPL-SCNC: 139 MMOL/L (ref 136–145)
SODIUM SERPL-SCNC: 139 MMOL/L (ref 136–145)
WBC # BLD AUTO: 4.45 K/UL (ref 3.9–12.7)

## 2024-07-23 PROCEDURE — 80053 COMPREHEN METABOLIC PANEL: CPT

## 2024-07-23 PROCEDURE — 25000003 PHARM REV CODE 250

## 2024-07-23 PROCEDURE — 97530 THERAPEUTIC ACTIVITIES: CPT

## 2024-07-23 PROCEDURE — 97535 SELF CARE MNGMENT TRAINING: CPT

## 2024-07-23 PROCEDURE — 83880 ASSAY OF NATRIURETIC PEPTIDE: CPT | Performed by: NURSE PRACTITIONER

## 2024-07-23 PROCEDURE — 99232 SBSQ HOSP IP/OBS MODERATE 35: CPT | Mod: ,,, | Performed by: PHYSICIAN ASSISTANT

## 2024-07-23 PROCEDURE — 97116 GAIT TRAINING THERAPY: CPT

## 2024-07-23 PROCEDURE — 25000003 PHARM REV CODE 250: Performed by: HOSPITALIST

## 2024-07-23 PROCEDURE — 36415 COLL VENOUS BLD VENIPUNCTURE: CPT | Performed by: NURSE PRACTITIONER

## 2024-07-23 PROCEDURE — 83735 ASSAY OF MAGNESIUM: CPT

## 2024-07-23 PROCEDURE — 63600175 PHARM REV CODE 636 W HCPCS

## 2024-07-23 PROCEDURE — 25000003 PHARM REV CODE 250: Performed by: PHYSICIAN ASSISTANT

## 2024-07-23 PROCEDURE — 11000001 HC ACUTE MED/SURG PRIVATE ROOM

## 2024-07-23 PROCEDURE — 63600175 PHARM REV CODE 636 W HCPCS: Performed by: HOSPITALIST

## 2024-07-23 PROCEDURE — 25000003 PHARM REV CODE 250: Performed by: INTERNAL MEDICINE

## 2024-07-23 PROCEDURE — 85025 COMPLETE CBC W/AUTO DIFF WBC: CPT

## 2024-07-23 RX ORDER — POTASSIUM CHLORIDE 20 MEQ/1
20 TABLET, EXTENDED RELEASE ORAL ONCE
Status: COMPLETED | OUTPATIENT
Start: 2024-07-23 | End: 2024-07-23

## 2024-07-23 RX ADMIN — SENNOSIDES AND DOCUSATE SODIUM 1 TABLET: 50; 8.6 TABLET ORAL at 09:07

## 2024-07-23 RX ADMIN — FUROSEMIDE 40 MG: 10 INJECTION, SOLUTION INTRAMUSCULAR; INTRAVENOUS at 08:07

## 2024-07-23 RX ADMIN — HYDRALAZINE HYDROCHLORIDE 50 MG: 25 TABLET ORAL at 09:07

## 2024-07-23 RX ADMIN — HYDRALAZINE HYDROCHLORIDE 50 MG: 25 TABLET ORAL at 06:07

## 2024-07-23 RX ADMIN — ENOXAPARIN SODIUM 40 MG: 40 INJECTION SUBCUTANEOUS at 05:07

## 2024-07-23 RX ADMIN — LOSARTAN POTASSIUM 50 MG: 50 TABLET, FILM COATED ORAL at 09:07

## 2024-07-23 RX ADMIN — HYDROCODONE BITARTRATE AND ACETAMINOPHEN 1 TABLET: 5; 325 TABLET ORAL at 06:07

## 2024-07-23 RX ADMIN — HYDROCODONE BITARTRATE AND ACETAMINOPHEN 1 TABLET: 5; 325 TABLET ORAL at 02:07

## 2024-07-23 RX ADMIN — HYDRALAZINE HYDROCHLORIDE 50 MG: 25 TABLET ORAL at 02:07

## 2024-07-23 RX ADMIN — FUROSEMIDE 40 MG: 10 INJECTION, SOLUTION INTRAMUSCULAR; INTRAVENOUS at 09:07

## 2024-07-23 RX ADMIN — POLYETHYLENE GLYCOL 3350 17 G: 17 POWDER, FOR SOLUTION ORAL at 09:07

## 2024-07-23 RX ADMIN — POTASSIUM CHLORIDE 20 MEQ: 1500 TABLET, EXTENDED RELEASE ORAL at 11:07

## 2024-07-23 NOTE — PT/OT/SLP PROGRESS
Physical Therapy Treatment    Patient Name:  Kaleigh Delgado   MRN:  [id number]    Recommendations:     Discharge Recommendations: Moderate Intensity Therapy  Discharge Equipment Recommendations: to be determined by next level of care  Barriers to discharge: Decreased caregiver support    Assessment:     Kaleigh Delgado is a 74 y.o. female admitted with a medical diagnosis of CHF (congestive heart failure).  She presents with the following impairments/functional limitations: weakness, impaired endurance, impaired functional mobility, gait instability, impaired balance, impaired cardiopulmonary response to activity, decreased safety awareness, pain, decreased ROM, decreased lower extremity function.    Rehab Prognosis: Good; patient would benefit from acute skilled PT services to address these deficits and reach maximum level of function.    Recent Surgery: Procedure(s) (LRB):  INSERTION, PACEMAKER, BIVENTRICULAR (Left)      Plan:     During this hospitalization, patient to be seen 3 x/week to address the identified rehab impairments via gait training, therapeutic activities, therapeutic exercises and progress toward the following goals:    Plan of Care Expires:  07/31/24    Subjective     Chief Complaint: Pt is motivated to participate  Patient/Family Comments/goals: none stated  Pain/Comfort:  Pain Rating 1: 2/10  Location - Side 1: Left  Location - Orientation 1: generalized  Location 1: thigh (and groin)  Pain Addressed 1: Reposition, Distraction  Pain Rating Post-Intervention 1: 2/10      Objective:     Communicated with nurse Duong and [facility] chart review prior to session.  Patient found  on commode  with peripheral IV upon PT entry to room.     General Precautions: Standard, fall  Orthopedic Precautions: N/A  Braces: N/A  Respiratory Status: Room air     Functional Mobility:  Gait belt applied - Yes  Bed Mobility  Scooting: minimum assistance  Sit to Supine: minimum assistance for LE management and trunk  "management  Increased time needed due to pain  Transfers  Sit to Stand: minimum assistance with rolling walker  Toilet Transfer: contact guard assistance with rolling walker using Step Transfer  Gait  Patient ambulated 70ft x2 with rolling walker and contact guard assistance. Patient demonstrates occasional unsteady gait, kyphotic posture, verbal cuing for upright posture. No c/o dizziness or SOB.All lines remained intact throughout ambulation trail.  Multiple standing rest breaks taken when needed  Balance  Sitting: contact guard assistance  Standing: contact guard assistance  Fluctuating L thigh/groin pain throughout    Therapeutic Exercise  Patient performed 1 set(s) of 8 repetitions of the following seated exercises: ankle pumps, long arc quads, and marches for bilateral LE. Completed to demonstrate understanding.  Patient required skilled PT for instruction of exercises and appropriate cues to perform exercises safely and appropriately.      AM-PAC 6 CLICK MOBILITY  Turning over in bed (including adjusting bedclothes, sheets and blankets)?: 3  Sitting down on and standing up from a chair with arms (e.g., wheelchair, bedside commode, etc.): 3  Moving from lying on back to sitting on the side of the bed?: 3  Moving to and from a bed to a chair (including a wheelchair)?: 3  Need to walk in hospital room?: 3  Climbing 3-5 steps with a railing?: 1 (NT)  Basic Mobility Total Score: 16       Treatment & Education:  Reviewed role of PT in acute care and POC. Pt tolerated interventions fair. Reviewed importance of OOB activities, activity pacing, and HEP (marching/hip flex, hip abd, heel slides/LAQ, quad sets, ankle pumps) in order to maintain/regain strength. Encouraged to sit up in chair for all meals. Reviewed proper use of RW for safety and to reduce risk of falling. Reviewed "call don't fall" policy and increased risk of falling due to weakness, instructed to utilize call bell for assistance with all transfers. Pt " agreeable to all requests.    Patient left HOB elevated with all lines intact, call button in reach, and bed alarm on..    GOALS:   Multidisciplinary Problems       Physical Therapy Goals          Problem: Physical Therapy    Goal Priority Disciplines Outcome Goal Variances Interventions   Physical Therapy Goal     PT, PT/OT Progressing     Description: Goals to be met by 7/31/24.  1. Pt will complete bed mobility SBA.  2. Pt will complete sit to stand SBA.  3. Pt will ambulate 200ft SBA using RW.  4. Pt will increase AMPAC score by 2 points to progress functional mobility.                       Time Tracking:     PT Received On: 07/23/24  PT Start Time: 1325     PT Stop Time: 1350  PT Total Time (min): 25 min     Billable Minutes: Gait Training 15min and Therapeutic Activity 10min    Treatment Type: Treatment  PT/PTA: PT     Number of PTA visits since last PT visit: 0     07/23/2024

## 2024-07-23 NOTE — PROGRESS NOTES
[facility]  Cardiology  Progress Note    Patient Name: Kaleigh Delgado  MRN: [id number]  Admission Date: 7/13/2024  Hospital Length of Stay: 10 days  Code Status: Full Code   Attending Physician: Zaina Lombardi,*   Primary Care Physician: Aldair Kaur MD  Expected Discharge Date:   Principal Problem:CHF (congestive heart failure)    Subjective:     Hospital Course:   Patient is a 74 year old female with a past medical history of hypertension, history of CHF, followed in the past by [facility], apparent history of afib, but now in sinus rhythm. BNP greater than 2000. Chest x-ray shows CHF. EKG shows sinus bradycardia with PAC's. There is a mild increase in troponin 0.09. Patient states history of heart cath by [facility] in 2021 that was treated medically.    Recommend continue Iv diuresis, check echocardiogram, and blood pressure control. Patient will eventually need nuclear stress test. We will need to obtain cath report from [facility].        7/15/24 pt seen and examined today, resting in bed. On supplemental O2 via NC. Denies any CP at this time. C/o aches and fatigue, tele reviewed HR 40s on monitor. Labs reviewed, -2.3L for admission, troponin 0.309, Crt 1.0. Received 4 doses of lasix since admission not on any scheduled currently. Previously followed by [facility] had LHC done in 21', she is unsure of results. will get records. Nuc stress planned for tomorrow    7/16/24 Pt seen and examined today, does not feel well. Has lots of outside stressors,  is sick. Planned on nuc stress today EKG with intermittent 2:1 AVB, pt feeling anxious will reschedule once diuresed. BNP yesterday 2727, needs diuresis.     7/17/24 pt seen and examined today, doing better off oxygen during exam sitting up in chair. Diuresing well. Labs reviewed, chart reviewed. No acute CV events reported. Tele with occ 2:1AVB will get EP consult    7/18/24-Patient seen and examined today, sitting up in bedside chair. Feeling better, less SOB.  Continues to diurese well, BNP trending down. No CP. HR stable during exam. EP on board, CRT-P planned once euvolemic. Creatinine stable.     7/19/24 pt seen and examined today sitting up in bedside chair feeling better. -4L for admission. SB on tele, CRT-P planned with EP once diuresed. Crt 1.0 today    07/20/2024  Improved sob. Remain ben and AV dissociation, no dizziness faint and PND. Cr 1.0 stable I&O -4.4 liter since admission    07/21/2024  On tele now 2:1 AV conduction, Cr stable, good UOP. SOB improved. No dizziness faint    7/22/2024  -Patient seen and examined in room, reports improvement in shortness of breath symptoms since IV diuresis. Labs reviewed, K+ 4.3, Cr 1.2, Na 140. Plan for tentative CRT P on Wednesday.     7/23/24-Patient seen and examined today. No AEON. SOB improved. Labs reviewed. Creatinine stable. BNP trending down. CRT-P planned tmw, keep NPO after MN.        Review of Systems   Constitutional: Positive for malaise/fatigue.   HENT: Negative.     Eyes: Negative.    Cardiovascular:  Positive for dyspnea on exertion.   Respiratory:  Positive for shortness of breath.    Endocrine: Negative.    Hematologic/Lymphatic: Negative.    Skin: Negative.    Musculoskeletal: Negative.    Gastrointestinal: Negative.    Genitourinary: Negative.    Neurological: Negative.    Psychiatric/Behavioral: Negative.     Allergic/Immunologic: Negative.      Objective:     Vital Signs (Most Recent):  Temp: 97.7 °F (36.5 °C) (07/23/24 0805)  Pulse: (!) 53 (07/23/24 0842)  Resp: 19 (07/23/24 0805)  BP: (!) 161/68 (07/23/24 0805)  SpO2: 96 % (07/23/24 0805) Vital Signs (24h Range):  Temp:  [97.7 °F (36.5 °C)-98.2 °F (36.8 °C)] 97.7 °F (36.5 °C)  Pulse:  [39-53] 53  Resp:  [17-20] 19  SpO2:  [95 %-98 %] 96 %  BP: (134-176)/(56-69) 161/68     Weight: 63.1 kg (139 lb 1.8 oz)  Body mass index is 19.96 kg/m².     SpO2: 96 %       No intake or output data in the 24 hours ending 07/23/24 1008    Lines/Drains/Airways        "Peripheral Intravenous Line  Duration                  Peripheral IV - Single Lumen 07/20/24 1910 22 G Left Hand 2 days                       Physical Exam  Vitals and nursing note reviewed.   Constitutional:       General: She is not in acute distress.     Appearance: Normal appearance. She is well-developed. She is not diaphoretic.   HENT:      Head: Normocephalic and atraumatic.   Eyes:      General:         Right eye: No discharge.         Left eye: No discharge.      Pupils: Pupils are equal, round, and reactive to light.   Neck:      Comments: +JVD    Cardiovascular:      Rate and Rhythm: Regular rhythm. Bradycardia present.      Heart sounds: Normal heart sounds, S1 normal and S2 normal. No murmur heard.  Pulmonary:      Effort: Pulmonary effort is normal. No respiratory distress.      Breath sounds: Normal breath sounds.   Abdominal:      General: There is no distension.   Skin:     General: Skin is warm and dry.      Findings: No erythema.   Neurological:      Mental Status: She is alert and oriented to person, place, and time.   Psychiatric:         Behavior: Behavior normal.            Significant Labs: CMP   Recent Labs   Lab 07/22/24  0347 07/23/24  0313     140 139  139   K 4.3  4.3 3.6  3.6     103 104  104   CO2 22*  22* 26  26   GLU 90  90 92  92   BUN 45*  45* 46*  46*   CREATININE 1.2  1.2 1.1  1.1   CALCIUM 9.1  9.1 8.3*  8.3*   PROT 6.0 4.9*   ALBUMIN 3.2* 2.9*   BILITOT 0.8 0.6   ALKPHOS 83 72   AST 27 20   ALT 22 19   ANIONGAP 15  15 9  9   , CBC   Recent Labs   Lab 07/22/24  0347 07/23/24  0314   WBC 4.58 4.45   HGB 11.2* 10.4*   HCT 33.7* 31.2*    169   , Troponin No results for input(s): "TROPONINI" in the last 48 hours., and All pertinent lab results from the last 24 hours have been reviewed.    Significant Imaging: Echocardiogram: Transthoracic echo (TTE) complete ([facility] Only):   Results for orders placed or performed during the hospital encounter of " 07/13/24   Echo   Result Value Ref Range    BSA 1.86 m2    LVIDd 4.20 3.5 - 6.0 cm    LV Systolic Volume 47.52 mL    LV Systolic Volume Index 25.4 mL/m2    LVIDs 3.40 2.1 - 4.0 cm    LV Diastolic Volume 78.48 mL    LV Diastolic Volume Index 41.97 mL/m2    Left Ventricular End Systolic Volume by Teichholz Method 47.52 mL    Left Ventricular End Diastolic Volume by Teichholz Method 78.48 mL    IVS 1.53 (A) 0.6 - 1.1 cm    LVOT diameter 2.07 cm    LVOT area 3.4 cm2    FS 19 (A) 28 - 44 %    Left Ventricle Relative Wall Thickness 0.70 cm    Posterior Wall 1.47 (A) 0.6 - 1.1 cm    LV mass 249.50 g    LV Mass Index 133 g/m2    MV Peak E Bobby 1.00 m/s    TDI LATERAL 0.09 m/s    TDI SEPTAL 0.11 m/s    E/E' ratio 10.00 m/s    MV Peak A Bobby 0.83 m/s    TR Max Bobby 3.24 m/s    E/A ratio 1.20     IVRT 87.54 msec    E wave deceleration time 256.49 msec    LV SEPTAL E/E' RATIO 9.09 m/s    LV LATERAL E/E' RATIO 11.11 m/s    LA size 4.89 cm    Left Atrium Minor Axis 6.92 cm    Left Atrium Major Axis 7.90 cm    RA Major Axis 6.58 cm    AV regurgitation pressure 1/2 time [id number] ms    AR Max Bobby 4.16 m/s    AV mean gradient 11 mmHg    AV peak gradient 20 mmHg    Ao peak bobby 2.25 m/s    Ao VTI 59.70 cm    MV stenosis pressure 1/2 time 74.38 ms    MV valve area p 1/2 method 2.96 cm2    Triscuspid Valve Regurgitation Peak Gradient 42 mmHg    PV PEAK VELOCITY 1.41 m/s    PV peak gradient 8 mmHg    Pulmonary Valve Mean Velocity 0.91 m/s    STJ 3.00 cm    IVC diameter 1.08 cm    Mean e' 0.10 m/s    ZLVIDS 0.44     ZLVIDD -2.16     EF 55 %    TV resting pulmonary artery pressure 45 mmHg    RV TB RVSP 6 mmHg    Est. RA pres 3 mmHg    Narrative      Left Ventricle: The left ventricle is normal in size. Normal wall   thickness. There is concentric hypertrophy. There is normal systolic   function. Ejection fraction by visual approximation is 55%.    Right Ventricle: Normal right ventricular cavity size. Wall thickness   is normal.  Systolic function is normal.    Aortic Valve: There is mild aortic regurgitation with a centrally   directed jet.    Pulmonary Artery: The estimated pulmonary artery systolic pressure is   45 mmHg.    IVC/SVC: Normal venous pressure at 3 mmHg.      and EKG: Reviewed  Assessment and Plan:   Patient who presents with acute CHF/AV dissociation. Stable. Continue IV diuresis today. CRT-P planned tmw, keep NPO after MN.    * CHF (congestive heart failure)  BNP 2367, received IV lasix x4. On nasal cannula  GDMT ARB, not on BB given bradycardia  Kidney function stable  Repeat BNP pending  Echo yesterday EF 55%  Nuc stress tomorrow  F/u in [facility]    7/16/24  Hold off on nuc stress today, diurese  BNP elevated  IV diuresis cont ARB  Strict I/Os monitor renal function    7/17/24  Cont IV diuresis, ARB    7/18/24  -Improving, BNP trending down  -Continue IV diuresis for additional day  -Continue ARB    07/20/2024  Continue lasix 40 mg ivp bid    07/21/2024  Continue lasix     7/22/2024  -Avoid AV giacomo agents given bradycardia  -Continue IV lasix 40 mg BID, Losartan  -Dash diet 2 gm sodium restriction  -1500 ml fluid restriction  -Update BNP in AM    7/23/24  -BNP trending down, CV wise remains stable  -Continue IV Lasix, ARB, hydralazine  -CRT-P planned tmw AM, keep NPO after MN    Elevated troponin  -OP MPI stress test recommended    Bradycardia  HR 40s with PVCs, asymptomatic  Avoid AV giacomo blocking agents  F/u with primary cardiologist to monitor  Home med list with only HCTZ    7/16/24  EKG during stress today with int 2:1 AVB    7/17/24  EP consulted given int 2:1AVB, AV disassociation   Appreciate recs    7/18/24  -EP on board, CRT-P planned once more euvolemic    07/20/2024  Remains ben and AV dissociation  CRT-P early next week per EP    07/21/2024  Continue CHF Rx  CRT-P early next week per EP     7/23/24  -CRT-P planned tmw, keep NPO after MN      Acute hypoxemic respiratory failure  -Secondary to CHF, improved  with IV diureiss     Hypertension  -Titrate medications, cont ARB    07/20/2024  Continue hydralazine and ARB  Avoid avn blocker due to ben and AV dissociation      SOB (shortness of breath)  Improving since admission  BNP pending  Wean O2 as tolerated    7/16/24  BNP still elevated  Diurese    7/18/24  -Improving, continue IV Lasix        VTE Risk Mitigation (From admission, onward)           Ordered     enoxaparin injection 40 mg  Daily         07/13/24 2334     IP VTE HIGH RISK PATIENT  Once         07/13/24 2334     Place sequential compression device  Until discontinued         07/13/24 2334                    Shanti Lake PA-C  Cardiology  [facility]

## 2024-07-23 NOTE — PLAN OF CARE
PT tolerated interventions fair due to L thigh pain. Required MIN A for STS, ambulated 70ft x2 CGA using RW. Recommending moderate intensity therapy upon d/c.

## 2024-07-23 NOTE — SUBJECTIVE & OBJECTIVE
Review of Systems   Constitutional: Positive for malaise/fatigue.   HENT: Negative.     Eyes: Negative.    Cardiovascular:  Positive for dyspnea on exertion.   Respiratory:  Positive for shortness of breath.    Endocrine: Negative.    Hematologic/Lymphatic: Negative.    Skin: Negative.    Musculoskeletal: Negative.    Gastrointestinal: Negative.    Genitourinary: Negative.    Neurological: Negative.    Psychiatric/Behavioral: Negative.     Allergic/Immunologic: Negative.      Objective:     Vital Signs (Most Recent):  Temp: 97.7 °F (36.5 °C) (07/23/24 0805)  Pulse: (!) 53 (07/23/24 0842)  Resp: 19 (07/23/24 0805)  BP: (!) 161/68 (07/23/24 0805)  SpO2: 96 % (07/23/24 0805) Vital Signs (24h Range):  Temp:  [97.7 °F (36.5 °C)-98.2 °F (36.8 °C)] 97.7 °F (36.5 °C)  Pulse:  [39-53] 53  Resp:  [17-20] 19  SpO2:  [95 %-98 %] 96 %  BP: (134-176)/(56-69) 161/68     Weight: 63.1 kg (139 lb 1.8 oz)  Body mass index is 19.96 kg/m².     SpO2: 96 %       No intake or output data in the 24 hours ending 07/23/24 1008    Lines/Drains/Airways       Peripheral Intravenous Line  Duration                  Peripheral IV - Single Lumen 07/20/24 1910 22 G Left Hand 2 days                       Physical Exam  Vitals and nursing note reviewed.   Constitutional:       General: She is not in acute distress.     Appearance: Normal appearance. She is well-developed. She is not diaphoretic.   HENT:      Head: Normocephalic and atraumatic.   Eyes:      General:         Right eye: No discharge.         Left eye: No discharge.      Pupils: Pupils are equal, round, and reactive to light.   Neck:      Comments: +JVD    Cardiovascular:      Rate and Rhythm: Regular rhythm. Bradycardia present.      Heart sounds: Normal heart sounds, S1 normal and S2 normal. No murmur heard.  Pulmonary:      Effort: Pulmonary effort is normal. No respiratory distress.      Breath sounds: Normal breath sounds.   Abdominal:      General: There is no distension.  "  Skin:     General: Skin is warm and dry.      Findings: No erythema.   Neurological:      Mental Status: She is alert and oriented to person, place, and time.   Psychiatric:         Behavior: Behavior normal.            Significant Labs: CMP   Recent Labs   Lab 07/22/24  0347 07/23/24  0313     140 139  139   K 4.3  4.3 3.6  3.6     103 104  104   CO2 22*  22* 26  26   GLU 90  90 92  92   BUN 45*  45* 46*  46*   CREATININE 1.2  1.2 1.1  1.1   CALCIUM 9.1  9.1 8.3*  8.3*   PROT 6.0 4.9*   ALBUMIN 3.2* 2.9*   BILITOT 0.8 0.6   ALKPHOS 83 72   AST 27 20   ALT 22 19   ANIONGAP 15  15 9  9   , CBC   Recent Labs   Lab 07/22/24  0347 07/23/24  0314   WBC 4.58 4.45   HGB 11.2* 10.4*   HCT 33.7* 31.2*    169   , Troponin No results for input(s): "TROPONINI" in the last 48 hours., and All pertinent lab results from the last 24 hours have been reviewed.    Significant Imaging: Echocardiogram: Transthoracic echo (TTE) complete ([facility] Only):   Results for orders placed or performed during the hospital encounter of 07/13/24   Echo   Result Value Ref Range    BSA 1.86 m2    LVIDd 4.20 3.5 - 6.0 cm    LV Systolic Volume 47.52 mL    LV Systolic Volume Index 25.4 mL/m2    LVIDs 3.40 2.1 - 4.0 cm    LV Diastolic Volume 78.48 mL    LV Diastolic Volume Index 41.97 mL/m2    Left Ventricular End Systolic Volume by Teichholz Method 47.52 mL    Left Ventricular End Diastolic Volume by Teichholz Method 78.48 mL    IVS 1.53 (A) 0.6 - 1.1 cm    LVOT diameter 2.07 cm    LVOT area 3.4 cm2    FS 19 (A) 28 - 44 %    Left Ventricle Relative Wall Thickness 0.70 cm    Posterior Wall 1.47 (A) 0.6 - 1.1 cm    LV mass 249.50 g    LV Mass Index 133 g/m2    MV Peak E Bobby 1.00 m/s    TDI LATERAL 0.09 m/s    TDI SEPTAL 0.11 m/s    E/E' ratio 10.00 m/s    MV Peak A Bobby 0.83 m/s    TR Max Bobby 3.24 m/s    E/A ratio 1.20     IVRT 87.54 msec    E wave deceleration time 256.49 msec    LV SEPTAL E/E' RATIO 9.09 m/s    LV " LATERAL E/E' RATIO 11.11 m/s    LA size 4.89 cm    Left Atrium Minor Axis 6.92 cm    Left Atrium Major Axis 7.90 cm    RA Major Axis 6.58 cm    AV regurgitation pressure 1/2 time [id number] ms    AR Max Bobby 4.16 m/s    AV mean gradient 11 mmHg    AV peak gradient 20 mmHg    Ao peak bobby 2.25 m/s    Ao VTI 59.70 cm    MV stenosis pressure 1/2 time 74.38 ms    MV valve area p 1/2 method 2.96 cm2    Triscuspid Valve Regurgitation Peak Gradient 42 mmHg    PV PEAK VELOCITY 1.41 m/s    PV peak gradient 8 mmHg    Pulmonary Valve Mean Velocity 0.91 m/s    STJ 3.00 cm    IVC diameter 1.08 cm    Mean e' 0.10 m/s    ZLVIDS 0.44     ZLVIDD -2.16     EF 55 %    TV resting pulmonary artery pressure 45 mmHg    RV TB RVSP 6 mmHg    Est. RA pres 3 mmHg    Narrative      Left Ventricle: The left ventricle is normal in size. Normal wall   thickness. There is concentric hypertrophy. There is normal systolic   function. Ejection fraction by visual approximation is 55%.    Right Ventricle: Normal right ventricular cavity size. Wall thickness   is normal. Systolic function is normal.    Aortic Valve: There is mild aortic regurgitation with a centrally   directed jet.    Pulmonary Artery: The estimated pulmonary artery systolic pressure is   45 mmHg.    IVC/SVC: Normal venous pressure at 3 mmHg.      and EKG: Reviewed

## 2024-07-23 NOTE — ASSESSMENT & PLAN NOTE
Avoid AV giacomo agents given bradycardia  -Continue IV lasix 40 mg BID, Losartan  -Dash diet 2 gm sodium restriction  -1500 ml fluid restriction  -

## 2024-07-23 NOTE — ASSESSMENT & PLAN NOTE
Patient is identified as having  evidence of new onset  heart failure that is Acute. CHF is currently uncontrolled due to Continued edema of extremities, Dyspnea not returned to baseline after single doses of IV diuretic, >3 pillow orthopnea, Rales/crackles on pulmonary exam, and Pulmonary edema/pleural effusion on CXR. Latest ECHO performed and demonstrates 55% EF  Continue Furosemide and monitor clinical status closely. Monitor on telemetry. Patient is on CHF pathway.  Monitor strict Is&Os and daily weights.  Place on fluid restriction of 1.5 L. Cardiology has been consulted. Continue to stress to patient importance of self efficacy and  on diet for CHF. Last BNP reviewed- and noted below   Recent Labs   Lab 07/23/24  0314   *     -Cardiology following   -ECHO: Ejection fraction 55%   -Planned Nuc stress test cancelled per cardiology   -IV lasix 40mg q12h   -Room air  -Continue PT/OT to eval and treat; SNF placement pending  -Cardiology consulted Electrophysiology Ayde Dubon NP plan for CRT Pacemaker on Wednesday 07/24/24   - BNP improving, 463

## 2024-07-23 NOTE — ASSESSMENT & PLAN NOTE
BNP 2367, received IV lasix x4. On nasal cannula  GDMT ARB, not on BB given bradycardia  Kidney function stable  Repeat BNP pending  Echo yesterday EF 55%  Nuc stress tomorrow  F/u in [facility]    7/16/24  Hold off on nuc stress today, diurese  BNP elevated  IV diuresis cont ARB  Strict I/Os monitor renal function    7/17/24  Cont IV diuresis, ARB    7/18/24  -Improving, BNP trending down  -Continue IV diuresis for additional day  -Continue ARB    07/20/2024  Continue lasix 40 mg ivp bid    07/21/2024  Continue lasix     7/22/2024  -Avoid AV giacomo agents given bradycardia  -Continue IV lasix 40 mg BID, Losartan  -Dash diet 2 gm sodium restriction  -1500 ml fluid restriction  -Update BNP in AM    7/23/24  -BNP trending down, CV wise remains stable  -Continue IV Lasix, ARB, hydralazine  -CRT-P planned tmw AM, keep NPO after MN

## 2024-07-23 NOTE — PROGRESS NOTES
[facility]  [facility]  Progress Note    Patient Name: Kaleigh Delgado  MRN: [id number]  Patient Class: IP- Inpatient   Admission Date: 7/13/2024  Length of Stay: 10 days  Attending Physician: Zaina Lombardi,*  Primary Care Provider: Aldair Kaur MD        Subjective:     Principal Problem:CHF (congestive heart failure)        HPI:  Kaleigh Delgado is a 74 y.o. female with a PMH  has no past medical history on file. who presented to the ED for further evaluation of worsening dyspnea/shortness of breath and bilateral leg swelling x2 days duration.  Patient denies prior history of CHF and is not currently on home O2 or diuretics.  Given worsening symptoms, patient called EMS and was found to be hypoxic with O2 saturation 88% on room air and was initiated on supplemental oxygen at 6 L via nasal cannula but was titrated up to 15 L non-rebreather.  Patient reported no known alleviating factors, and aggravating factors including laying flat and with exertion.  She denied endorsing any lightheadedness, dizziness, headache, visual changes, fever, chills, sweats, nausea, vomiting, chest pain, abdominal pain, dysuria, hematuria, melena, hematochezia, diarrhea, or onset neurological deficits.  Prior to onset of symptoms, patient reported being in her usual state of health with no other concerns or complaints.  All other review of systems negative except as noted above.  Initial workup in the ED revealed patient to be tachypneic and hypoxic with elevated D-dimer measuring 1.30.  BNP 03/20/2067, troponin 0.090, flu/COVID negative, UA negative for UTI. CTA positive for mild to moderate pulmonary edema, mild left pleural effusion, small right pleural effusion, but negative for pulmonary embolus.  Patient admitted to Hospital Medicine inpatient for continued medical management and workup of new onset heart failure.    PCP: No, Primary Doctor      Overview/Hospital Course:  74 y.o. female with no past medical  history on file admitted for new onset of CHF. Patient denies history of CHF or home oxygen use. Patient is currently for Hctz 12.5 mg daily for blood pressure management. Ed work up revealed elevated D-dimer measuring 1.30. BNP 2367, troponin 0.090, flu/COVID negative, UA negative for UTI. CTA positive for mild to moderate pulmonary edema, mild left pleural effusion, small right pleural effusion, but negative for pulmonary embolus. CXR showed cardiomegaly with perihilar edema. Correlate clinically to CHF. Trace pleural effusions. Correlate clinically to CHF. Echo resulted with a 55% EF. Cardiology following.    BNP improving, 463 this morning. Patient remains bradycardic with elevated BP. PRN IV hydralazine available. Cardiology following. Reported improvement in SOB with IV lasix, will continue per cardiology.  Nuclear stress test cancelled per cardiology due to AV block , LBBB/RBBB. Continue PT/OT to eval and treat- evaluated and reported patient would benefit from SNF. Placement pending CRT pacemaker. Cardiology consulted Electrophysiology Ayde Dubon NP plan for CRT Pacemaker on Wednesday 07/22/24. NPO after midnight. Follow-up labs in am.    Interval History: Denies dizziness, chest pain, or lightheadedness. Reported SOB improvement at rest, worse with exertion. Reported left groin pain. Describes a pulling pain when attempt to straighten leg. Improves with frequent position changes and PRN pain medication. On RA. Afebrile.     Review of Systems   Constitutional:  Positive for activity change (improvement), appetite change (improvement) and fatigue (with exertion). Negative for chills and fever.   HENT:  Negative for congestion, facial swelling, nosebleeds, rhinorrhea, sinus pressure, sinus pain, sneezing, sore throat and trouble swallowing.    Eyes:  Negative for photophobia and visual disturbance.   Respiratory:  Positive for cough. Negative for choking, chest tightness, shortness of breath and wheezing.     Cardiovascular:  Negative for chest pain, palpitations and leg swelling.   Gastrointestinal:  Negative for abdominal distention, abdominal pain, constipation, diarrhea, nausea and vomiting.   Endocrine: Negative for cold intolerance and heat intolerance.   Genitourinary:  Negative for difficulty urinating, dysuria, flank pain, frequency, hematuria and urgency.   Musculoskeletal:  Positive for arthralgias, back pain and myalgias. Negative for gait problem and joint swelling.   Skin:  Positive for color change (BLE discoloration from chronic venous stasis). Negative for pallor, rash and wound.   Allergic/Immunologic: Negative for environmental allergies, food allergies and immunocompromised state.   Neurological:  Positive for weakness (generalized). Negative for dizziness, seizures, syncope, speech difficulty, numbness and headaches.   Hematological:  Bruises/bleeds easily.   Psychiatric/Behavioral:  Negative for agitation and confusion. The patient is not nervous/anxious.      Objective:     Vital Signs (Most Recent):  Temp: 97.7 °F (36.5 °C) (07/23/24 0805)  Pulse: (!) 53 (07/23/24 0842)  Resp: 19 (07/23/24 0805)  BP: (!) 161/68 (07/23/24 0805)  SpO2: 96 % (07/23/24 0805) Vital Signs (24h Range):  Temp:  [97.7 °F (36.5 °C)-98.2 °F (36.8 °C)] 97.7 °F (36.5 °C)  Pulse:  [39-53] 53  Resp:  [17-20] 19  SpO2:  [95 %-98 %] 96 %  BP: (134-176)/(56-69) 161/68     Weight: 63.1 kg (139 lb 1.8 oz)  Body mass index is 19.96 kg/m².  No intake or output data in the 24 hours ending 07/23/24 1006      Physical Exam  Constitutional:       General: She is not in acute distress.     Appearance: Normal appearance. She is not ill-appearing or toxic-appearing.   HENT:      Head: Normocephalic and atraumatic.      Right Ear: External ear normal.      Left Ear: External ear normal.      Nose: Nose normal. No congestion or rhinorrhea.      Mouth/Throat:      Mouth: Mucous membranes are moist.      Pharynx: Oropharynx is clear. No  oropharyngeal exudate or posterior oropharyngeal erythema.   Eyes:      Extraocular Movements: Extraocular movements intact.      Conjunctiva/sclera: Conjunctivae normal.      Pupils: Pupils are equal, round, and reactive to light.   Neck:      Vascular: No carotid bruit.   Cardiovascular:      Rate and Rhythm: Regular rhythm. Bradycardia present.      Pulses: Normal pulses.      Heart sounds: Normal heart sounds. No murmur heard.     No friction rub. No gallop.   Pulmonary:      Effort: Pulmonary effort is normal. No respiratory distress.      Breath sounds: Normal breath sounds. No wheezing, rhonchi or rales.      Comments: Diminished bases bilaterally  Abdominal:      General: Abdomen is flat. Bowel sounds are normal. There is no distension.      Palpations: Abdomen is soft.      Tenderness: There is no abdominal tenderness. There is no guarding or rebound.   Musculoskeletal:         General: No swelling. Normal range of motion.      Cervical back: Normal range of motion and neck supple. No tenderness.      Right lower leg: No edema.      Left lower leg: No edema.   Skin:     General: Skin is warm.      Capillary Refill: Capillary refill takes less than 2 seconds.      Coloration: Skin is not pale.      Findings: Bruising present. No erythema or rash.      Comments: Chronic venous stasis bilaterally   Neurological:      General: No focal deficit present.      Mental Status: She is alert and oriented to person, place, and time.      Sensory: No sensory deficit.      Motor: Weakness (generalized) present.      Coordination: Coordination normal.      Gait: Gait normal.   Psychiatric:         Mood and Affect: Mood normal.         Behavior: Behavior normal.         Thought Content: Thought content normal.         Judgment: Judgment normal.             Significant Labs: All pertinent labs within the past 24 hours have been reviewed.    Significant Imaging: I have reviewed all pertinent imaging results/findings within  the past 24 hours.    Assessment/Plan:      * CHF (congestive heart failure)  Patient is identified as having  evidence of new onset  heart failure that is Acute. CHF is currently uncontrolled due to Continued edema of extremities, Dyspnea not returned to baseline after single doses of IV diuretic, >3 pillow orthopnea, Rales/crackles on pulmonary exam, and Pulmonary edema/pleural effusion on CXR. Latest ECHO performed and demonstrates 55% EF  Continue Furosemide and monitor clinical status closely. Monitor on telemetry. Patient is on CHF pathway.  Monitor strict Is&Os and daily weights.  Place on fluid restriction of 1.5 L. Cardiology has been consulted. Continue to stress to patient importance of self efficacy and  on diet for CHF. Last BNP reviewed- and noted below   Recent Labs   Lab 07/23/24  0314   *     -Cardiology following   -ECHO: Ejection fraction 55%   -Planned Nuc stress test cancelled per cardiology   -IV lasix 40mg q12h   -Room air  -Continue PT/OT to eval and treat; SNF placement pending  -Cardiology consulted Electrophysiology Ayde Dubon NP plan for CRT Pacemaker on Wednesday 07/24/24   - BNP improving, 463    Acute hypoxemic respiratory failure  Patient with Hypoxic Respiratory failure which is Acute.  she is not on home oxygen. Supplemental oxygen was provided and noted-      Signs/symptoms of respiratory failure include- tachypnea, increased work of breathing, and respiratory distress. Contributing diagnoses includes - CHF, Pleural effusion, Pneumonia, and Pulmonary Embolus Labs and images were reviewed. Patient Has not had a recent ABG. Will treat underlying causes and adjust management of respiratory failure as follows:  Plan:  -Improving  -Continue treatment and workup of CHF  -Titrate oxygen requirements as needed  -Incentive spirometry   -Monitor pulse oximetry  -Nohemy prn      SOB (shortness of breath)  - improving   - IV lasix  - supplemental oxygen as needed   - CXR:  Diminishing vascular congestion  - CTA: Cardiomegaly. Mild moderate pulmonary edema. Mild left-sided pleural effusion. Small right-sided pleural effusion. Correlate clinically to CHF. No evidence for pulmonary emboli. Atypical infectious process not excluded.     Hypertension  Chronic, uncontrolled. Latest blood pressure and vitals reviewed-     Temp:  [97.6 °F (36.4 °C)-98.2 °F (36.8 °C)]   Pulse:  [40-48]   Resp:  [17-20]   BP: (135-179)/(56-74)   SpO2:  [93 %-99 %] .   Home meds for hypertension were reviewed and noted below.   Hypertension Medications               hydroCHLOROthiazide (MICROZIDE) 12.5 mg capsule Take 1 capsule by mouth every morning.     While in the hospital, will manage blood pressure as follows; Continue home antihypertensive regimen  - added hydralazine 50 mg q8h  - added losartan 50 mg daily     Will utilize p.r.n. blood pressure medication only if patient's blood pressure greater than 160/100 and she develops symptoms such as worsening chest pain or shortness of breath.      Left groin pain  - Reported 5/10 pulling pain, radiates down to knee  - controlled with PRN pain medication and frequent position changes  - 07/14/24 XRAY left hip negative for fracture or dislocation      Bradycardia  -HR 40s with PVCs, asymptomatic  -Cardiology following   -BNP down trending to 583  -Echo yesterday EF 55%  -Avoid AV giacomo blocking agents  -not a candidate for nuclear stress test due to heart block  -Cardiology consulted Electrophysiology Ayde Dubon NP plan for CRT Pacemaker 07/24/24    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)    Avoid AV giacomo agents given bradycardia  -Continue IV lasix 40 mg BID, Losartan  -Dash diet 2 gm sodium restriction  -1500 ml fluid restriction  -    Elevated troponin  Cardiology consulted   Trop 0.090>0.309> 0.203   IV diuresis   BNP down trending to 583        VTE Risk Mitigation (From admission, onward)           Ordered     enoxaparin  injection 40 mg  Daily         07/13/24 2334     IP VTE HIGH RISK PATIENT  Once         07/13/24 2334     Place sequential compression device  Until discontinued         07/13/24 2334                    Discharge Planning   DUTCH:      Code Status: Full Code   Is the patient medically ready for discharge?:     Reason for patient still in hospital (select all that apply): Patient trending condition  Discharge Plan A: Skilled Nursing Facility   Discharge Delays: None known at this time              JIL BajwaC  Department of Hospital Medicine   [facility]

## 2024-07-23 NOTE — ASSESSMENT & PLAN NOTE
- Reported 5/10 pulling pain, radiates down to knee  - controlled with PRN pain medication and frequent position changes  - 07/14/24 XRAY left hip negative for fracture or dislocation

## 2024-07-23 NOTE — SUBJECTIVE & OBJECTIVE
Interval History: Denies dizziness, chest pain, or lightheadedness. Reported SOB improvement at rest, worse with exertion. Reported left groin pain. Describes a pulling pain when attempt to straighten leg. Improves with frequent position changes and PRN pain medication. On RA. Afebrile.     Review of Systems   Constitutional:  Positive for activity change (improvement), appetite change (improvement) and fatigue (with exertion). Negative for chills and fever.   HENT:  Negative for congestion, facial swelling, nosebleeds, rhinorrhea, sinus pressure, sinus pain, sneezing, sore throat and trouble swallowing.    Eyes:  Negative for photophobia and visual disturbance.   Respiratory:  Positive for cough. Negative for choking, chest tightness, shortness of breath and wheezing.    Cardiovascular:  Negative for chest pain, palpitations and leg swelling.   Gastrointestinal:  Negative for abdominal distention, abdominal pain, constipation, diarrhea, nausea and vomiting.   Endocrine: Negative for cold intolerance and heat intolerance.   Genitourinary:  Negative for difficulty urinating, dysuria, flank pain, frequency, hematuria and urgency.   Musculoskeletal:  Positive for arthralgias, back pain and myalgias. Negative for gait problem and joint swelling.   Skin:  Positive for color change (BLE discoloration from chronic venous stasis). Negative for pallor, rash and wound.   Allergic/Immunologic: Negative for environmental allergies, food allergies and immunocompromised state.   Neurological:  Positive for weakness (generalized). Negative for dizziness, seizures, syncope, speech difficulty, numbness and headaches.   Hematological:  Bruises/bleeds easily.   Psychiatric/Behavioral:  Negative for agitation and confusion. The patient is not nervous/anxious.      Objective:     Vital Signs (Most Recent):  Temp: 97.7 °F (36.5 °C) (07/23/24 0805)  Pulse: (!) 53 (07/23/24 0842)  Resp: 19 (07/23/24 0805)  BP: (!) 161/68 (07/23/24  0805)  SpO2: 96 % (07/23/24 0805) Vital Signs (24h Range):  Temp:  [97.7 °F (36.5 °C)-98.2 °F (36.8 °C)] 97.7 °F (36.5 °C)  Pulse:  [39-53] 53  Resp:  [17-20] 19  SpO2:  [95 %-98 %] 96 %  BP: (134-176)/(56-69) 161/68     Weight: 63.1 kg (139 lb 1.8 oz)  Body mass index is 19.96 kg/m².  No intake or output data in the 24 hours ending 07/23/24 1006      Physical Exam  Constitutional:       General: She is not in acute distress.     Appearance: Normal appearance. She is not ill-appearing or toxic-appearing.   HENT:      Head: Normocephalic and atraumatic.      Right Ear: External ear normal.      Left Ear: External ear normal.      Nose: Nose normal. No congestion or rhinorrhea.      Mouth/Throat:      Mouth: Mucous membranes are moist.      Pharynx: Oropharynx is clear. No oropharyngeal exudate or posterior oropharyngeal erythema.   Eyes:      Extraocular Movements: Extraocular movements intact.      Conjunctiva/sclera: Conjunctivae normal.      Pupils: Pupils are equal, round, and reactive to light.   Neck:      Vascular: No carotid bruit.   Cardiovascular:      Rate and Rhythm: Regular rhythm. Bradycardia present.      Pulses: Normal pulses.      Heart sounds: Normal heart sounds. No murmur heard.     No friction rub. No gallop.   Pulmonary:      Effort: Pulmonary effort is normal. No respiratory distress.      Breath sounds: Normal breath sounds. No wheezing, rhonchi or rales.      Comments: Diminished bases bilaterally  Abdominal:      General: Abdomen is flat. Bowel sounds are normal. There is no distension.      Palpations: Abdomen is soft.      Tenderness: There is no abdominal tenderness. There is no guarding or rebound.   Musculoskeletal:         General: No swelling. Normal range of motion.      Cervical back: Normal range of motion and neck supple. No tenderness.      Right lower leg: No edema.      Left lower leg: No edema.   Skin:     General: Skin is warm.      Capillary Refill: Capillary refill takes  less than 2 seconds.      Coloration: Skin is not pale.      Findings: Bruising present. No erythema or rash.      Comments: Chronic venous stasis bilaterally   Neurological:      General: No focal deficit present.      Mental Status: She is alert and oriented to person, place, and time.      Sensory: No sensory deficit.      Motor: Weakness (generalized) present.      Coordination: Coordination normal.      Gait: Gait normal.   Psychiatric:         Mood and Affect: Mood normal.         Behavior: Behavior normal.         Thought Content: Thought content normal.         Judgment: Judgment normal.             Significant Labs: All pertinent labs within the past 24 hours have been reviewed.    Significant Imaging: I have reviewed all pertinent imaging results/findings within the past 24 hours.

## 2024-07-23 NOTE — CONSULTS
Discussed accepting SNF facilities with patient at bedside. Advised the 2 facilities in [place] do not have bed availability. Patient would like to discharge home with home health. Patient's spouse is currently at a SNF in [place] so help at home is limited which prompted the SNF recommendation. Patient states that she will stay at her friend, Reva, home or Reva will assist her at her home when she discharges. Patient was independent prior to admission and uses a cane and has a RW at home for ambulation if needed.     Agreeable to referral being sent to [facility]  (she has used in the past) for home health and [facility]  if [facility] is unable to accept.     1311: [facility] accepted patient. Advised orders will be place prior to DC.

## 2024-07-23 NOTE — PT/OT/SLP PROGRESS
Occupational Therapy   Treatment    Name: Kaleigh Delgado  MRN: [id number]  Admitting Diagnosis:  CHF (congestive heart failure)       Recommendations:     Discharge Recommendations: Moderate Intensity Therapy  Discharge Equipment Recommendations:  to be determined by next level of care  Barriers to discharge:  Decreased caregiver support    Assessment:     Kaleigh Delgado is a 74 y.o. female with a medical diagnosis of CHF (congestive heart failure).  She presents with the following performance deficits affecting function are weakness, impaired endurance, impaired self care skills, impaired functional mobility, gait instability, impaired balance, decreased safety awareness, pain, decreased ROM, impaired cardiopulmonary response to activity.     Rehab Prognosis:  Good; patient would benefit from acute skilled OT services to address these deficits and reach maximum level of function.       Plan:     Patient to be seen 2 x/week to address the above listed problems via self-care/home management, therapeutic activities, therapeutic exercises  Plan of Care Expires: 07/31/24  Plan of Care Reviewed with: patient    Subjective     Chief Complaint: L groin/thigh pain, fatigue  Patient/Family Comments/goals: improve strength, mobility, and independence  Pain/Comfort:  Pain Rating 1: 2/10  Location - Side 1: Left  Location - Orientation 1: generalized  Location 1: groin (/thigh)  Pain Addressed 1: Reposition, Distraction  Pain Rating Post-Intervention 1: 2/10    Objective:     Communicated with: Nurse and epic chart review prior to session.  Patient found  sitting on bathroom commode  with peripheral IV upon OT entry to room.    General Precautions: Standard, fall    Orthopedic Precautions:N/A  Braces: N/A  Respiratory Status: Room air     Occupational Performance:     Bed Mobility:    Patient completed Sit to Supine with minimum assistance for BLE management and increased time d/t pain.  Supine scooting to [facility] with Min A.      Functional Mobility/Transfers:  Patient completed Sit > Stand Transfer from bathroom commode with minimum assistance  with  rolling walker   Patient completed Transfer to EOB using Stand Pivot technique with contact guard assistance with rolling walker  Functional Mobility: Patient completed x70ft x2 functional mobility with CGA and RW to increase dynamic standing balance and activity tolerance needed for ADL completion. Required multiple short standing rest breaks d/t pain and fatigue.    Activities of Daily Living:  Grooming: setup A. Performed oral care and washed face while standing at sink. Pt weightbearing through UEs on sink countertop for support.   Toileting: setup A. Pt found on bathroom commode; pt able to clean self.    [facility] 6 Click ADL: 19    Treatment & Education:  Pt performed x10 reps BUE AROM therex EOB:  Shoulder flexion  Elbow flexion/ext  Wrist flexion/ext  Digit flexion/ext  Chest press with scapular retractions  Educated pt on pursed lip breathing technique and importance of activity pacing. Patient educated on role of OT in acute setting and benefits of participation. Educated on techniques to use to increase independence and decrease fall risk with functional transfers. Educated on importance of OOB activity and calling for A to transfer back to bed. Encouraged completion of B UE AROM therex throughout the day to tolerance to increase functional strength and activity tolerance. Educated patient on importance of increased tolerance to upright position and direct impact on CV endurance and strength. Patient encouraged to sit up in chair for a minimum of 2 consecutive hours per day. Patient stated understanding and in agreement with POC.    Patient left HOB elevated with all lines intact and call button in reach    GOALS:   Multidisciplinary Problems       Occupational Therapy Goals          Problem: Occupational Therapy    Goal Priority Disciplines Outcome Interventions   Occupational  Therapy Goal     OT, PT/OT Progressing    Description: Goals to be met by: 7/31/24     Patient will increase functional independence with ADLs by performing:    UE Dressing with Modified [place].  Grooming while standing at sink with Modified [place].  Toileting from toilet with Modified [place] for hygiene and clothing management.   Toilet transfer to toilet with Modified [place] with RW.  Upper extremity exercise program x15 reps per handout, with independence.                         Time Tracking:     OT Date of Treatment: 07/23/24  OT Start Time: 1330  OT Stop Time: 1355  OT Total Time (min): 25 min    Billable Minutes:Self Care/Home Management 10  Therapeutic Activity 15    OT/MEGHAN: OT      Jaylene Smith OT     7/23/2024

## 2024-07-23 NOTE — ASSESSMENT & PLAN NOTE
HR 40s with PVCs, asymptomatic  Avoid AV giacomo blocking agents  F/u with primary cardiologist to monitor  Home med list with only HCTZ    7/16/24  EKG during stress today with int 2:1 AVB    7/17/24  EP consulted given int 2:1AVB, AV disassociation   Appreciate recs    7/18/24  -EP on board, CRT-P planned once more euvolemic    07/20/2024  Remains ben and AV dissociation  CRT-P early next week per EP    07/21/2024  Continue CHF Rx  CRT-P early next week per EP     7/23/24  -CRT-P planned tmw, keep NPO after MN

## 2024-07-24 ENCOUNTER — ANESTHESIA (OUTPATIENT)
Dept: CARDIOLOGY | Facility: HOSPITAL | Age: 74
End: 2024-07-24
Payer: MEDICARE

## 2024-07-24 ENCOUNTER — ANESTHESIA EVENT (OUTPATIENT)
Dept: CARDIOLOGY | Facility: HOSPITAL | Age: 74
End: 2024-07-24
Payer: MEDICARE

## 2024-07-24 DIAGNOSIS — I50.9 CONGESTIVE HEART FAILURE, UNSPECIFIED HF CHRONICITY, UNSPECIFIED HEART FAILURE TYPE: ICD-10-CM

## 2024-07-24 DIAGNOSIS — I45.89 ATRIOVENTRICULAR (AV) DISSOCIATION: ICD-10-CM

## 2024-07-24 DIAGNOSIS — R00.1 BRADYCARDIA: Primary | ICD-10-CM

## 2024-07-24 PROBLEM — I44.1 AV BLOCK, MOBITZ II: Status: ACTIVE | Noted: 2024-07-24

## 2024-07-24 LAB
ALBUMIN SERPL BCP-MCNC: 2.9 G/DL (ref 3.5–5.2)
ALP SERPL-CCNC: 68 U/L (ref 55–135)
ALT SERPL W/O P-5'-P-CCNC: 16 U/L (ref 10–44)
ANION GAP SERPL CALC-SCNC: 11 MMOL/L (ref 8–16)
ANION GAP SERPL CALC-SCNC: 11 MMOL/L (ref 8–16)
AST SERPL-CCNC: 19 U/L (ref 10–40)
BASOPHILS # BLD AUTO: 0.03 K/UL (ref 0–0.2)
BASOPHILS NFR BLD: 0.7 % (ref 0–1.9)
BILIRUB SERPL-MCNC: 0.7 MG/DL (ref 0.1–1)
BUN SERPL-MCNC: 41 MG/DL (ref 8–23)
BUN SERPL-MCNC: 41 MG/DL (ref 8–23)
CALCIUM SERPL-MCNC: 8.5 MG/DL (ref 8.7–10.5)
CALCIUM SERPL-MCNC: 8.5 MG/DL (ref 8.7–10.5)
CHLORIDE SERPL-SCNC: 106 MMOL/L (ref 95–110)
CHLORIDE SERPL-SCNC: 106 MMOL/L (ref 95–110)
CO2 SERPL-SCNC: 25 MMOL/L (ref 23–29)
CO2 SERPL-SCNC: 25 MMOL/L (ref 23–29)
CREAT SERPL-MCNC: 1.1 MG/DL (ref 0.5–1.4)
CREAT SERPL-MCNC: 1.1 MG/DL (ref 0.5–1.4)
DIFFERENTIAL METHOD BLD: ABNORMAL
EOSINOPHIL # BLD AUTO: 0.1 K/UL (ref 0–0.5)
EOSINOPHIL NFR BLD: 2.6 % (ref 0–8)
ERYTHROCYTE [DISTWIDTH] IN BLOOD BY AUTOMATED COUNT: 12.6 % (ref 11.5–14.5)
EST. GFR  (NO RACE VARIABLE): 53 ML/MIN/1.73 M^2
EST. GFR  (NO RACE VARIABLE): 53 ML/MIN/1.73 M^2
GLUCOSE SERPL-MCNC: 91 MG/DL (ref 70–110)
GLUCOSE SERPL-MCNC: 91 MG/DL (ref 70–110)
HCT VFR BLD AUTO: 32.7 % (ref 37–48.5)
HGB BLD-MCNC: 10.8 G/DL (ref 12–16)
IMM GRANULOCYTES # BLD AUTO: 0.01 K/UL (ref 0–0.04)
IMM GRANULOCYTES NFR BLD AUTO: 0.2 % (ref 0–0.5)
LYMPHOCYTES # BLD AUTO: 1.4 K/UL (ref 1–4.8)
LYMPHOCYTES NFR BLD: 33.3 % (ref 18–48)
MAGNESIUM SERPL-MCNC: 2.1 MG/DL (ref 1.6–2.6)
MCH RBC QN AUTO: 32.3 PG (ref 27–31)
MCHC RBC AUTO-ENTMCNC: 33 G/DL (ref 32–36)
MCV RBC AUTO: 98 FL (ref 82–98)
MONOCYTES # BLD AUTO: 0.5 K/UL (ref 0.3–1)
MONOCYTES NFR BLD: 10.8 % (ref 4–15)
NEUTROPHILS # BLD AUTO: 2.2 K/UL (ref 1.8–7.7)
NEUTROPHILS NFR BLD: 52.4 % (ref 38–73)
NRBC BLD-RTO: 0 /100 WBC
PLATELET # BLD AUTO: 168 K/UL (ref 150–450)
PMV BLD AUTO: 11.2 FL (ref 9.2–12.9)
POTASSIUM SERPL-SCNC: 3.7 MMOL/L (ref 3.5–5.1)
POTASSIUM SERPL-SCNC: 3.7 MMOL/L (ref 3.5–5.1)
PROT SERPL-MCNC: 5 G/DL (ref 6–8.4)
RBC # BLD AUTO: 3.34 M/UL (ref 4–5.4)
SODIUM SERPL-SCNC: 142 MMOL/L (ref 136–145)
SODIUM SERPL-SCNC: 142 MMOL/L (ref 136–145)
WBC # BLD AUTO: 4.17 K/UL (ref 3.9–12.7)

## 2024-07-24 PROCEDURE — 63600175 PHARM REV CODE 636 W HCPCS: Performed by: STUDENT IN AN ORGANIZED HEALTH CARE EDUCATION/TRAINING PROGRAM

## 2024-07-24 PROCEDURE — 63600175 PHARM REV CODE 636 W HCPCS

## 2024-07-24 PROCEDURE — 11000001 HC ACUTE MED/SURG PRIVATE ROOM

## 2024-07-24 PROCEDURE — C1769 GUIDE WIRE: HCPCS | Performed by: INTERNAL MEDICINE

## 2024-07-24 PROCEDURE — C1882 AICD, OTHER THAN SING/DUAL: HCPCS | Performed by: INTERNAL MEDICINE

## 2024-07-24 PROCEDURE — 02HK3JZ INSERTION OF PACEMAKER LEAD INTO RIGHT VENTRICLE, PERCUTANEOUS APPROACH: ICD-10-PCS | Performed by: INTERNAL MEDICINE

## 2024-07-24 PROCEDURE — 25000003 PHARM REV CODE 250: Performed by: INTERNAL MEDICINE

## 2024-07-24 PROCEDURE — 93005 ELECTROCARDIOGRAM TRACING: CPT

## 2024-07-24 PROCEDURE — 0JH607Z INSERTION OF CARDIAC RESYNCHRONIZATION PACEMAKER PULSE GENERATOR INTO CHEST SUBCUTANEOUS TISSUE AND FASCIA, OPEN APPROACH: ICD-10-PCS | Performed by: INTERNAL MEDICINE

## 2024-07-24 PROCEDURE — C1730 CATH, EP, 19 OR FEW ELECT: HCPCS | Performed by: INTERNAL MEDICINE

## 2024-07-24 PROCEDURE — 93010 ELECTROCARDIOGRAM REPORT: CPT | Mod: ,,, | Performed by: INTERNAL MEDICINE

## 2024-07-24 PROCEDURE — 36415 COLL VENOUS BLD VENIPUNCTURE: CPT

## 2024-07-24 PROCEDURE — 27201423 OPTIME MED/SURG SUP & DEVICES STERILE SUPPLY: Performed by: INTERNAL MEDICINE

## 2024-07-24 PROCEDURE — 25500020 PHARM REV CODE 255: Performed by: INTERNAL MEDICINE

## 2024-07-24 PROCEDURE — 97535 SELF CARE MNGMENT TRAINING: CPT

## 2024-07-24 PROCEDURE — 83735 ASSAY OF MAGNESIUM: CPT

## 2024-07-24 PROCEDURE — 25000003 PHARM REV CODE 250: Performed by: HOSPITALIST

## 2024-07-24 PROCEDURE — 97530 THERAPEUTIC ACTIVITIES: CPT

## 2024-07-24 PROCEDURE — 97116 GAIT TRAINING THERAPY: CPT | Mod: CQ

## 2024-07-24 PROCEDURE — 97530 THERAPEUTIC ACTIVITIES: CPT | Mod: CQ

## 2024-07-24 PROCEDURE — 37000009 HC ANESTHESIA EA ADD 15 MINS: Performed by: INTERNAL MEDICINE

## 2024-07-24 PROCEDURE — 80053 COMPREHEN METABOLIC PANEL: CPT

## 2024-07-24 PROCEDURE — 63600175 PHARM REV CODE 636 W HCPCS: Performed by: INTERNAL MEDICINE

## 2024-07-24 PROCEDURE — 25000003 PHARM REV CODE 250

## 2024-07-24 PROCEDURE — 85025 COMPLETE CBC W/AUTO DIFF WBC: CPT

## 2024-07-24 PROCEDURE — C1887 CATHETER, GUIDING: HCPCS | Performed by: INTERNAL MEDICINE

## 2024-07-24 PROCEDURE — C1900 LEAD, CORONARY VENOUS: HCPCS | Performed by: INTERNAL MEDICINE

## 2024-07-24 PROCEDURE — C1894 INTRO/SHEATH, NON-LASER: HCPCS | Performed by: INTERNAL MEDICINE

## 2024-07-24 PROCEDURE — 99233 SBSQ HOSP IP/OBS HIGH 50: CPT | Mod: ,,, | Performed by: INTERNAL MEDICINE

## 2024-07-24 PROCEDURE — C1898 LEAD, PMKR, OTHER THAN TRANS: HCPCS | Performed by: INTERNAL MEDICINE

## 2024-07-24 PROCEDURE — 02H63JZ INSERTION OF PACEMAKER LEAD INTO RIGHT ATRIUM, PERCUTANEOUS APPROACH: ICD-10-PCS | Performed by: INTERNAL MEDICINE

## 2024-07-24 PROCEDURE — 25000003 PHARM REV CODE 250: Performed by: STUDENT IN AN ORGANIZED HEALTH CARE EDUCATION/TRAINING PROGRAM

## 2024-07-24 PROCEDURE — 37000008 HC ANESTHESIA 1ST 15 MINUTES: Performed by: INTERNAL MEDICINE

## 2024-07-24 DEVICE — PACING LEAD
Type: IMPLANTABLE DEVICE | Site: HEART | Status: FUNCTIONAL
Brand: TENDRIL™

## 2024-07-24 DEVICE — SLIT SUTURE SLEEVE (4.3 - 4.7F)
Type: IMPLANTABLE DEVICE | Site: HEART | Status: FUNCTIONAL
Brand: SJM™

## 2024-07-24 DEVICE — CARDIAC RESYNCHRONIZATION THERAPY DEVICE, PULSE GENERATOR DDDRV
Type: IMPLANTABLE DEVICE | Site: CHEST | Status: FUNCTIONAL
Brand: QUADRA ALLURE MP™

## 2024-07-24 DEVICE — LEFT HEART LEAD
Type: IMPLANTABLE DEVICE | Site: HEART | Status: FUNCTIONAL
Brand: QUARTET™

## 2024-07-24 RX ORDER — VANCOMYCIN HYDROCHLORIDE 1 G/20ML
INJECTION, POWDER, LYOPHILIZED, FOR SOLUTION INTRAVENOUS
Status: DISCONTINUED | OUTPATIENT
Start: 2024-07-24 | End: 2024-07-24 | Stop reason: HOSPADM

## 2024-07-24 RX ORDER — ACETAMINOPHEN 325 MG/1
650 TABLET ORAL EVERY 4 HOURS PRN
Status: DISCONTINUED | OUTPATIENT
Start: 2024-07-24 | End: 2024-07-26 | Stop reason: HOSPADM

## 2024-07-24 RX ORDER — PROPOFOL 10 MG/ML
VIAL (ML) INTRAVENOUS CONTINUOUS PRN
Status: DISCONTINUED | OUTPATIENT
Start: 2024-07-24 | End: 2024-07-24

## 2024-07-24 RX ORDER — MIDAZOLAM HYDROCHLORIDE 1 MG/ML
INJECTION INTRAMUSCULAR; INTRAVENOUS
Status: DISCONTINUED | OUTPATIENT
Start: 2024-07-24 | End: 2024-07-24

## 2024-07-24 RX ORDER — ONDANSETRON HYDROCHLORIDE 2 MG/ML
INJECTION, SOLUTION INTRAVENOUS
Status: DISCONTINUED | OUTPATIENT
Start: 2024-07-24 | End: 2024-07-24

## 2024-07-24 RX ORDER — CEFAZOLIN SODIUM 2 G/50ML
2 SOLUTION INTRAVENOUS
Status: CANCELLED | OUTPATIENT
Start: 2024-07-24

## 2024-07-24 RX ORDER — CEFADROXIL 500 MG/1
500 CAPSULE ORAL EVERY 12 HOURS
Qty: 6 CAPSULE | Refills: 0 | Status: SHIPPED | OUTPATIENT
Start: 2024-07-24

## 2024-07-24 RX ORDER — LIDOCAINE HYDROCHLORIDE 20 MG/ML
INJECTION, SOLUTION EPIDURAL; INFILTRATION; INTRACAUDAL; PERINEURAL
Status: DISCONTINUED | OUTPATIENT
Start: 2024-07-24 | End: 2024-07-24 | Stop reason: HOSPADM

## 2024-07-24 RX ORDER — KETAMINE HCL IN 0.9 % NACL 50 MG/5 ML
SYRINGE (ML) INTRAVENOUS
Status: DISCONTINUED | OUTPATIENT
Start: 2024-07-24 | End: 2024-07-24

## 2024-07-24 RX ORDER — DEXAMETHASONE SODIUM PHOSPHATE 4 MG/ML
INJECTION, SOLUTION INTRA-ARTICULAR; INTRALESIONAL; INTRAMUSCULAR; INTRAVENOUS; SOFT TISSUE
Status: DISCONTINUED | OUTPATIENT
Start: 2024-07-24 | End: 2024-07-24

## 2024-07-24 RX ORDER — HYDROCODONE BITARTRATE AND ACETAMINOPHEN 10; 325 MG/1; MG/1
1 TABLET ORAL EVERY 4 HOURS PRN
Status: DISCONTINUED | OUTPATIENT
Start: 2024-07-24 | End: 2024-07-26 | Stop reason: HOSPADM

## 2024-07-24 RX ORDER — HYDROCODONE BITARTRATE AND ACETAMINOPHEN 10; 325 MG/1; MG/1
1 TABLET ORAL EVERY 6 HOURS PRN
Qty: 28 TABLET | Refills: 0 | Status: SHIPPED | OUTPATIENT
Start: 2024-07-24 | End: 2024-07-26

## 2024-07-24 RX ORDER — LIDOCAINE HYDROCHLORIDE 20 MG/ML
INJECTION, SOLUTION EPIDURAL; INFILTRATION; INTRACAUDAL; PERINEURAL
Status: DISCONTINUED | OUTPATIENT
Start: 2024-07-24 | End: 2024-07-24

## 2024-07-24 RX ORDER — HYDROCODONE BITARTRATE AND ACETAMINOPHEN 5; 325 MG/1; MG/1
1 TABLET ORAL EVERY 4 HOURS PRN
Status: DISCONTINUED | OUTPATIENT
Start: 2024-07-24 | End: 2024-07-26 | Stop reason: HOSPADM

## 2024-07-24 RX ORDER — FENTANYL CITRATE 50 UG/ML
INJECTION, SOLUTION INTRAMUSCULAR; INTRAVENOUS
Status: DISCONTINUED | OUTPATIENT
Start: 2024-07-24 | End: 2024-07-24

## 2024-07-24 RX ADMIN — SODIUM CHLORIDE, SODIUM LACTATE, POTASSIUM CHLORIDE, AND CALCIUM CHLORIDE: .6; .31; .03; .02 INJECTION, SOLUTION INTRAVENOUS at 02:07

## 2024-07-24 RX ADMIN — Medication 6 MG: at 09:07

## 2024-07-24 RX ADMIN — HYDRALAZINE HYDROCHLORIDE 50 MG: 25 TABLET ORAL at 01:07

## 2024-07-24 RX ADMIN — SENNOSIDES AND DOCUSATE SODIUM 1 TABLET: 50; 8.6 TABLET ORAL at 09:07

## 2024-07-24 RX ADMIN — Medication 25 MG: at 02:07

## 2024-07-24 RX ADMIN — FENTANYL CITRATE 25 MCG: 50 INJECTION, SOLUTION INTRAMUSCULAR; INTRAVENOUS at 04:07

## 2024-07-24 RX ADMIN — ONDANSETRON 4 MG: 2 INJECTION INTRAMUSCULAR; INTRAVENOUS at 02:07

## 2024-07-24 RX ADMIN — LIDOCAINE HYDROCHLORIDE 50 MG: 20 INJECTION, SOLUTION EPIDURAL; INFILTRATION; INTRACAUDAL; PERINEURAL at 02:07

## 2024-07-24 RX ADMIN — FENTANYL CITRATE 50 MCG: 50 INJECTION, SOLUTION INTRAMUSCULAR; INTRAVENOUS at 03:07

## 2024-07-24 RX ADMIN — GLYCOPYRROLATE 0.2 MG: 0.2 INJECTION, SOLUTION INTRAMUSCULAR; INTRAVITREAL at 02:07

## 2024-07-24 RX ADMIN — HYDROCODONE BITARTRATE AND ACETAMINOPHEN 1 TABLET: 10; 325 TABLET ORAL at 06:07

## 2024-07-24 RX ADMIN — DEXAMETHASONE SODIUM PHOSPHATE 8 MG: 4 INJECTION, SOLUTION INTRA-ARTICULAR; INTRALESIONAL; INTRAMUSCULAR; INTRAVENOUS; SOFT TISSUE at 02:07

## 2024-07-24 RX ADMIN — HYDRALAZINE HYDROCHLORIDE 50 MG: 25 TABLET ORAL at 09:07

## 2024-07-24 RX ADMIN — LOSARTAN POTASSIUM 50 MG: 50 TABLET, FILM COATED ORAL at 09:07

## 2024-07-24 RX ADMIN — HYDRALAZINE HYDROCHLORIDE 50 MG: 25 TABLET ORAL at 05:07

## 2024-07-24 RX ADMIN — ONDANSETRON 4 MG: 2 INJECTION INTRAMUSCULAR; INTRAVENOUS at 05:07

## 2024-07-24 RX ADMIN — MIDAZOLAM HYDROCHLORIDE 2 MG: 1 INJECTION, SOLUTION INTRAMUSCULAR; INTRAVENOUS at 02:07

## 2024-07-24 RX ADMIN — FUROSEMIDE 40 MG: 10 INJECTION, SOLUTION INTRAMUSCULAR; INTRAVENOUS at 09:07

## 2024-07-24 RX ADMIN — CEFAZOLIN 2 G: 2 INJECTION, POWDER, FOR SOLUTION INTRAMUSCULAR; INTRAVENOUS at 09:07

## 2024-07-24 RX ADMIN — FENTANYL CITRATE 50 MCG: 50 INJECTION, SOLUTION INTRAMUSCULAR; INTRAVENOUS at 02:07

## 2024-07-24 RX ADMIN — PROPOFOL 50 MCG/KG/MIN: 10 INJECTION, EMULSION INTRAVENOUS at 02:07

## 2024-07-24 RX ADMIN — SODIUM CHLORIDE: 9 INJECTION, SOLUTION INTRAVENOUS at 02:07

## 2024-07-24 NOTE — ASSESSMENT & PLAN NOTE
Chronic, uncontrolled. Latest blood pressure and vitals reviewed-     Temp:  [98.1 °F (36.7 °C)-98.8 °F (37.1 °C)]   Pulse:  [41-50]   Resp:  [17-19]   BP: (126-180)/(52-81)   SpO2:  [92 %-98 %] .   Home meds for hypertension were reviewed and noted below.   Hypertension Medications               hydroCHLOROthiazide (MICROZIDE) 12.5 mg capsule Take 1 capsule by mouth every morning.     While in the hospital, will manage blood pressure as follows; Continue home antihypertensive regimen  - added hydralazine 50 mg q8h  - added losartan 50 mg daily     Will utilize p.r.n. blood pressure medication only if patient's blood pressure greater than 160/100 and she develops symptoms such as worsening chest pain or shortness of breath.

## 2024-07-24 NOTE — ANESTHESIA PREPROCEDURE EVALUATION
07/24/2024  Kaleigh Delgado is a 74 y.o., female.      Pre-op Assessment    I have reviewed the Patient Summary Reports.     I have reviewed the Nursing Notes. I have reviewed the NPO Status.      Review of Systems  Anesthesia Hx:  No problems with previous Anesthesia                Social:  Non-Smoker, No Alcohol Use       Cardiovascular:     Hypertension       CHF              Shortness of Breath       Congestive Heart Failure (CHF)                Hypertension         Pulmonary:      Shortness of breath                      Physical Exam  General: Well nourished, Cooperative, Alert and Oriented    Airway:  Mallampati: II / II  Mouth Opening: Normal  TM Distance: Normal  Tongue: Normal  Neck ROM: Normal ROM    Dental:  Intact    Chest/Lungs:  Normal Respiratory Rate    Heart:  Rate: Normal  Rhythm: Regular Rhythm     Echo    Result Date: 7/14/2024    Left Ventricle: The left ventricle is normal in size. Normal wall   thickness. There is concentric hypertrophy. There is normal systolic   function. Ejection fraction by visual approximation is 55%.    Right Ventricle: Normal right ventricular cavity size. Wall thickness   is normal. Systolic function is normal.    Aortic Valve: There is mild aortic regurgitation with a centrally   directed jet.    Pulmonary Artery: The estimated pulmonary artery systolic pressure is   45 mmHg.    IVC/SVC: Normal venous pressure at 3 mmHg.          Anesthesia Plan  Type of Anesthesia, risks & benefits discussed:    Anesthesia Type: MAC  Intra-op Monitoring Plan: Standard ASA Monitors  Post Op Pain Control Plan: multimodal analgesia  Induction:  IV  Informed Consent: Informed consent signed with the Patient and all parties understand the risks and agree with anesthesia plan.  All questions answered. Patient consented to blood products? Yes  ASA Score: 3  Day of Surgery Review of  History & Physical: H&P Update referred to the surgeon/provider.    Ready For Surgery From Anesthesia Perspective.     .

## 2024-07-24 NOTE — INTERVAL H&P NOTE
The patient has been examined and the H&P has been reviewed:    I concur with the findings and changes have been noted since the H&P was written:  Patient is currently well compensated and is ready for pacemaker implantation.    Procedure risks, benefits and alternative options discussed and understood by patient/family.          Active Hospital Problems    Diagnosis  POA    *CHF (congestive heart failure) [I50.9]  Yes    Left groin pain [R10.32]  Yes    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) [I50.22]  Yes    Elevated troponin [R79.89]  Yes    Hypertension [I10]  Yes    Acute hypoxemic respiratory failure [J96.01]  Yes    Bradycardia [R00.1]  Yes    SOB (shortness of breath) [R06.02]  Yes      Resolved Hospital Problems   No resolved problems to display.      [id number]

## 2024-07-24 NOTE — SUBJECTIVE & OBJECTIVE
Interval History: Denies dizziness, chest pain, or lightheadedness. Improved SOB and left groin pain. On RA. Afebrile. Procedure scheduled for after lunch today per patient.    Review of Systems   Constitutional:  Positive for activity change (improvement), appetite change (improvement) and fatigue (with exertion). Negative for chills and fever.   HENT:  Negative for congestion, facial swelling, nosebleeds, rhinorrhea, sinus pressure, sinus pain, sneezing, sore throat and trouble swallowing.    Eyes:  Negative for photophobia and visual disturbance.   Respiratory:  Positive for cough. Negative for choking, chest tightness, shortness of breath and wheezing.    Cardiovascular:  Negative for chest pain, palpitations and leg swelling.   Gastrointestinal:  Negative for abdominal distention, abdominal pain, constipation, diarrhea, nausea and vomiting.   Endocrine: Negative for cold intolerance and heat intolerance.   Genitourinary:  Negative for difficulty urinating, dysuria, flank pain, frequency, hematuria and urgency.   Musculoskeletal:  Positive for arthralgias, back pain and myalgias. Negative for gait problem and joint swelling.   Skin:  Positive for color change (BLE discoloration from chronic venous stasis). Negative for pallor, rash and wound.   Allergic/Immunologic: Negative for environmental allergies, food allergies and immunocompromised state.   Neurological:  Positive for weakness (generalized). Negative for dizziness, seizures, syncope, speech difficulty, numbness and headaches.   Hematological:  Bruises/bleeds easily.   Psychiatric/Behavioral:  Negative for agitation and confusion. The patient is not nervous/anxious.      Objective:     Vital Signs (Most Recent):  Temp: 98.8 °F (37.1 °C) (07/24/24 0754)  Pulse: (!) 43 (07/24/24 0900)  Resp: 18 (07/24/24 0754)  BP: (!) 154/67 (07/24/24 0754)  SpO2: 96 % (07/24/24 0754) Vital Signs (24h Range):  Temp:  [98.1 °F (36.7 °C)-98.8 °F (37.1 °C)] 98.8 °F (37.1  °C)  Pulse:  [41-50] 43  Resp:  [17-19] 18  SpO2:  [92 %-98 %] 96 %  BP: (126-180)/(52-81) 154/67     Weight: 65.3 kg (143 lb 15.4 oz)  Body mass index is 20.66 kg/m².  No intake or output data in the 24 hours ending 07/24/24 0942      Physical Exam  Constitutional:       General: She is not in acute distress.     Appearance: Normal appearance. She is not ill-appearing or toxic-appearing.   HENT:      Head: Normocephalic and atraumatic.      Right Ear: External ear normal.      Left Ear: External ear normal.      Nose: Nose normal. No congestion or rhinorrhea.      Mouth/Throat:      Mouth: Mucous membranes are moist.      Pharynx: Oropharynx is clear. No oropharyngeal exudate or posterior oropharyngeal erythema.   Eyes:      Extraocular Movements: Extraocular movements intact.      Conjunctiva/sclera: Conjunctivae normal.      Pupils: Pupils are equal, round, and reactive to light.   Neck:      Vascular: No carotid bruit.   Cardiovascular:      Rate and Rhythm: Regular rhythm. Bradycardia present.      Pulses: Normal pulses.      Heart sounds: Normal heart sounds. No murmur heard.     No friction rub. No gallop.   Pulmonary:      Effort: Pulmonary effort is normal. No respiratory distress.      Breath sounds: Normal breath sounds. No wheezing, rhonchi or rales.      Comments: Diminished bases bilaterally  Abdominal:      General: Abdomen is flat. Bowel sounds are normal. There is no distension.      Palpations: Abdomen is soft.      Tenderness: There is no abdominal tenderness. There is no guarding or rebound.   Musculoskeletal:         General: No swelling. Normal range of motion.      Cervical back: Normal range of motion and neck supple. No tenderness.      Right lower leg: No edema.      Left lower leg: No edema.   Skin:     General: Skin is warm.      Capillary Refill: Capillary refill takes less than 2 seconds.      Coloration: Skin is not pale.      Findings: Bruising present. No erythema or rash.       Comments: Chronic venous stasis bilaterally   Neurological:      General: No focal deficit present.      Mental Status: She is alert and oriented to person, place, and time.      Sensory: No sensory deficit.      Motor: Weakness (generalized) present.      Coordination: Coordination normal.      Gait: Gait normal.   Psychiatric:         Mood and Affect: Mood normal.         Behavior: Behavior normal.         Thought Content: Thought content normal.         Judgment: Judgment normal.             Significant Labs: All pertinent labs within the past 24 hours have been reviewed.    Significant Imaging: I have reviewed all pertinent imaging results/findings within the past 24 hours.

## 2024-07-24 NOTE — ASSESSMENT & PLAN NOTE
Patient is identified as having  evidence of new onset  heart failure that is Acute. CHF is currently uncontrolled due to Continued edema of extremities, Dyspnea not returned to baseline after single doses of IV diuretic, >3 pillow orthopnea, Rales/crackles on pulmonary exam, and Pulmonary edema/pleural effusion on CXR. Latest ECHO performed and demonstrates 55% EF  Continue Furosemide and monitor clinical status closely. Monitor on telemetry. Patient is on CHF pathway.  Monitor strict Is&Os and daily weights.  Place on fluid restriction of 1.5 L. Cardiology has been consulted. Continue to stress to patient importance of self efficacy and  on diet for CHF. Last BNP reviewed- and noted below   Recent Labs   Lab 07/23/24  0314   *     -Cardiology following   -ECHO: Ejection fraction 55%   -Planned Nuc stress test cancelled per cardiology   -IV lasix 40mg q12h   -Room air  -Continue PT/OT to eval and treat; SNF placement pending  -Cardiology consulted Electrophysiology Ayde Dubon NP plan for CRT Pacemaker today after lunch - Continue NPO   - BNP improving

## 2024-07-24 NOTE — PT/OT/SLP PROGRESS
Occupational Therapy   Treatment    Name: Kaleigh Delgado  MRN: [id number]  Admitting Diagnosis:  CHF (congestive heart failure)  Day of Surgery    Recommendations:     Discharge Recommendations: Moderate Intensity Therapy  Discharge Equipment Recommendations:  to be determined by next level of care  Barriers to discharge:  Decreased caregiver support    Assessment:     Kaleigh Delgado is a 74 y.o. female with a medical diagnosis of CHF (congestive heart failure).  Performance deficits affecting function are weakness, gait instability, decreased upper extremity function, decreased lower extremity function, impaired balance, impaired endurance, decreased safety awareness, impaired self care skills, impaired functional mobility, impaired cognition.     Rehab Prognosis:  Good; patient would benefit from acute skilled OT services to address these deficits and reach maximum level of function.       Plan:     Patient to be seen 2 x/week to address the above listed problems via self-care/home management, therapeutic activities, therapeutic exercises  Plan of Care Expires: 07/31/24  Plan of Care Reviewed with: patient    Subjective     Chief Complaint: Weakness  Patient/Family Comments/goals: increase independence  Pain/Comfort:  Pain Rating 1: 0/10  Pain Rating Post-Intervention 1: 0/10    Objective:     Communicated with: Nurse prior to session.  Patient found supine with telemetry, peripheral IV upon OT entry to room.    General Precautions: Standard, fall    Orthopedic Precautions:N/A  Braces: N/A  Respiratory Status: Room air     Occupational Performance:     Bed Mobility:    Patient completed Rolling/Turning to Left with  stand by assistance  Patient completed Scooting/Bridging with stand by assistance  Patient completed Supine to Sit with stand by assistance     Functional Mobility/Transfers:  Patient completed Sit <> Stand Transfer with contact guard assistance  with  rolling walker   Patient completed Bed <> Chair  Transfer using Stand Pivot technique with contact guard assistance with rolling walker  Patient completed Toilet Transfer Stand Pivot technique with contact guard assistance with  rolling walker and grab bars  Functional Mobility: Gait trained with RW CGA 2x100 feet with frequent standing rest breaks     Activities of Daily Living:  Toileting: contact guard assistance for safety      [facility] 6 Click ADL: 20    Treatment & Education:  Pt demonstrated positive gains with continued skilled OT services. Following gait training, patient performed seated BUE AROM exercises x10 reps in all planes to increase functional strength/endurance needed for daily activities. Pt instructed to continue with the above exercises, as well as to utilize call button for nursing assistance when ready to return to bed. Pt verbalized understanding.     Patient left up in chair with all lines intact, call button in reach, and chair alarm on    GOALS:   Multidisciplinary Problems       Occupational Therapy Goals          Problem: Occupational Therapy    Goal Priority Disciplines Outcome Interventions   Occupational Therapy Goal     OT, PT/OT Progressing    Description: Goals to be met by: 7/31/24     Patient will increase functional independence with ADLs by performing:    UE Dressing with Modified [place].  Grooming while standing at sink with Modified [place].  Toileting from toilet with Modified [place] for hygiene and clothing management.   Toilet transfer to toilet with Modified [place] with RW.  Upper extremity exercise program x15 reps per handout, with independence.                         Time Tracking:     OT Date of Treatment: 07/24/24  OT Start Time: 1100  OT Stop Time: 1125  OT Total Time (min): 25 min    Billable Minutes:Self Care/Home Management 15  Therapeutic Activity 10    RADHA Nicholson  OT/MEGHAN: OT          7/24/2024

## 2024-07-24 NOTE — PLAN OF CARE
Remains injury free. Ambulated in hallway , tolerated well. Up in chair, tolerated well. NPO  for pacemaker today. No s/s acute distress.

## 2024-07-24 NOTE — ASSESSMENT & PLAN NOTE
-HR 40s with PVCs, asymptomatic  -Cardiology following   -BNP down trending to 583  -Echo yesterday EF 55%  -Avoid AV giacomo blocking agents  -not a candidate for nuclear stress test due to heart block  -Cardiology consulted Electrophysiology Ayde Dubon NP plan for CRT Pacemaker later today

## 2024-07-24 NOTE — ASSESSMENT & PLAN NOTE
HR 40s with PVCs, asymptomatic  Avoid AV giacomo blocking agents  F/u with primary cardiologist to monitor  Home med list with only HCTZ    7/16/24  EKG during stress today with int 2:1 AVB    7/17/24  EP consulted given int 2:1AVB, AV disassociation   Appreciate recs    7/18/24  -EP on board, CRT-P planned once more euvolemic    07/20/2024  Remains ben and AV dissociation  CRT-P early next week per EP    07/21/2024  Continue CHF Rx  CRT-P early next week per EP     7/23/24  -CRT-P planned tmw, keep NPO after MN    7/24/24  -CRT-P planned today

## 2024-07-24 NOTE — TRANSFER OF CARE
"Anesthesia Transfer of Care Note    Patient: Kaleigh Delgado    Procedure(s) Performed: Procedure(s) (LRB):  INSERTION, PACEMAKER, BIVENTRICULAR (Left)    Patient location: Cath Lab    Anesthesia Type: MAC    Transport from OR: Transported from OR on room air with adequate spontaneous ventilation    Post pain: adequate analgesia    Post assessment: no apparent anesthetic complications and tolerated procedure well    Post vital signs: stable    Level of consciousness: responds to stimulation, awake and alert    Nausea/Vomiting: no nausea/vomiting    Complications: none    Transfer of care protocol was followedComments: Report given to Cath Lab RN at bedside. Hand off tool used. RN given opportunity to ask questions or clarify concerns. No Concerns verbalized. RN was asked if ready to assume care of patient. RN verbally confirmed. Pt. Left in stable condition. SV. Vital Signs Return to Near Baseline. No s/s of distress noted.       Last vitals: Visit Vitals  BP (!) 171/72 (BP Location: Left arm, Patient Position: Lying)   Pulse (!) 49   Temp 36.8 °C (98.3 °F) (Oral)   Resp 19   Ht 5' 10" (1.778 m)   Wt 65.3 kg (143 lb 15.4 oz)   SpO2 (!) 94%   BMI 20.66 kg/m²     "

## 2024-07-24 NOTE — ANESTHESIA POSTPROCEDURE EVALUATION
Anesthesia Post Evaluation    Patient: aKleigh Delgado    Procedure(s) Performed: Procedure(s) (LRB):  INSERTION, PACEMAKER, BIVENTRICULAR (Left)    Final Anesthesia Type: MAC      Patient location during evaluation: Cath Lab  Patient participation: Yes- Able to Participate  Level of consciousness: awake and alert  Post-procedure vital signs: reviewed and stable  Pain management: adequate  Airway patency: patent  CESARIO mitigation strategies: Multimodal analgesia and Extubation and recovery carried out in lateral, semiupright, or other nonsupine position  PONV status at discharge: No PONV  Anesthetic complications: no      Cardiovascular status: hemodynamically stable, blood pressure returned to baseline and stable  Respiratory status: unassisted, spontaneous ventilation and room air  Hydration status: euvolemic  Follow-up not needed.  Comments: Report given to Cath Lab RN at bedside. RN given opportunity to ask questions or clarify concerns. No concerns verbalized. Pt. left in stable condition. SV. Vital Signs WNL when compared to pt. baseline. No s/s of distress noted.               Vitals Value Taken Time   /72 07/24/24 1214   Temp 36.8 °C (98.3 °F) 07/24/24 1214   Pulse 49 07/24/24 1413   Resp 19 07/24/24 1214   SpO2 94 % 07/24/24 1214         No case tracking events are documented in the log.      Pain/Kingsley Score: Pain Rating Prior to Med Admin: 5 (7/23/2024  2:30 PM)  Kingsley Score: 10 (7/24/2024  2:12 PM)

## 2024-07-24 NOTE — PT/OT/SLP PROGRESS
Physical Therapy  Treatment    Kaleigh Delgado   MRN: [id number]   Admitting Diagnosis: CHF (congestive heart failure)    PT Received On: 07/24/24  PT Start Time: 1100     PT Stop Time: 1125    PT Total Time (min): 25 min       Billable Minutes:  Gait Training 15 and Therapeutic Activity 10    Treatment Type: Treatment  PT/PTA: PTA     Number of PTA visits since last PT visit: 1       General Precautions: Standard, fall  Orthopedic Precautions: N/A  Braces: N/A  Respiratory Status: Room air    Spiritual, Cultural Beliefs, Buddhism Practices, Values that Affect Care: no    Subjective:  Communicated with patient's nurse, Eliana, and completed Epic chart review prior to session.  Patient agreed to PT session.     Pain/Comfort  Pain Rating 1: 0/10  Pain Rating Post-Intervention 1: 0/10    Objective:   Patient found with: peripheral IV    Supine > sit EOB: SBA    Forward scoot towards EOB: SBA    STS from EOB > RW: CGA    8ft EOB > toilet w/ RW: CGA    Stand pivot T/F to toilet w/ RW: CGA    STS from toilet > RW: CGA    100ft x2 trials w/ RW (multiple standing rest breaks throughout trial)    Stand pivot T/F to chair w/ RW: CGA    Completed x10 reps AROM TE to BLE: LAQ, Hip Flex, AP   Intermittent cues given as needed to maintain correct form during repetitions    Educated patient on importance of increased tolerance to upright position and direct impact on CV endurance and strength. Patient encouraged to sit up in chair/ EOB, for a minimum of 2 consecutive hours, 3x per day. Encouraged patient to perform AROM TE to BLE throughout the day within all available planes of motion. Re enforced importance of utilizing call light to meet needs in room and not attempt to get up without staff assistance. Patient verbalized understanding and agreed to comply.        AM-PAC 6 CLICK MOBILITY  How much help from another person does this patient currently need?   1 = Unable, Total/Dependent Assistance  2 = A lot, Maximum/Moderate  Assistance  3 = A little, Minimum/Contact Guard/Supervision  4 = None, Modified [place]/Independent    Turning over in bed (including adjusting bedclothes, sheets and blankets)?: 3  Sitting down on and standing up from a chair with arms (e.g., wheelchair, bedside commode, etc.): 3  Moving from lying on back to sitting on the side of the bed?: 3  Moving to and from a bed to a chair (including a wheelchair)?: 3  Need to walk in hospital room?: 3  Climbing 3-5 steps with a railing?: 1 (NT)  Basic Mobility Total Score: 16    AM-PAC Raw Score CMS G-Code Modifier Level of Impairment Assistance   6 % Total / Unable   7 - 9 CM 80 - 100% Maximal Assist   10 - 14 CL 60 - 80% Moderate Assist   15 - 19 CK 40 - 60% Moderate Assist   20 - 22 CJ 20 - 40% Minimal Assist   23 CI 1-20% SBA / CGA   24 CH 0% Independent/ Mod I     Patient left up in chair with call button in reach and chair alarm on.    Assessment:  Kaleigh Delgado is a 74 y.o. female with a medical diagnosis of CHF (congestive heart failure) and presents with overall decline in functional mobility. Patient would continue to benefit from skilled PT to address functional limitations listed below in order to return to PLOF/decrease caregiver burden.    Rehab identified problem list/impairments: weakness, impaired endurance, impaired self care skills, impaired functional mobility, gait instability, impaired balance, decreased upper extremity function, decreased ROM, impaired cardiopulmonary response to activity    Rehab potential is good.    Activity tolerance: Fair    Discharge recommendations: Moderate Intensity Therapy      Barriers to discharge:      Equipment recommendations: to be determined by next level of care     GOALS:   Multidisciplinary Problems       Physical Therapy Goals          Problem: Physical Therapy    Goal Priority Disciplines Outcome Goal Variances Interventions   Physical Therapy Goal     PT, PT/OT Progressing     Description: Goals to  be met by 7/31/24.  1. Pt will complete bed mobility SBA.  2. Pt will complete sit to stand SBA.  3. Pt will ambulate 200ft SBA using RW.  4. Pt will increase AMPAC score by 2 points to progress functional mobility.                       PLAN:    Patient to be seen 3 x/week to address the above listed problems via gait training, therapeutic activities, therapeutic exercises  Plan of Care expires: 07/31/24  Plan of Care reviewed with: patient         07/24/2024

## 2024-07-24 NOTE — PROGRESS NOTES
[facility]  Cardiology  Progress Note    Patient Name: Kaleigh Delgado  MRN: [id number]  Admission Date: 7/13/2024  Hospital Length of Stay: 11 days  Code Status: Full Code   Attending Physician: Zaina Lombardi,*   Primary Care Physician: Aldair Kaur MD  Expected Discharge Date:   Principal Problem:CHF (congestive heart failure)    Subjective:     Hospital Course:   Patient is a 74 year old female with a past medical history of hypertension, history of CHF, followed in the past by [facility], apparent history of afib, but now in sinus rhythm. BNP greater than 2000. Chest x-ray shows CHF. EKG shows sinus bradycardia with PAC's. There is a mild increase in troponin 0.09. Patient states history of heart cath by [facility] in 2021 that was treated medically.    Recommend continue Iv diuresis, check echocardiogram, and blood pressure control. Patient will eventually need nuclear stress test. We will need to obtain cath report from [facility].        7/15/24 pt seen and examined today, resting in bed. On supplemental O2 via NC. Denies any CP at this time. C/o aches and fatigue, tele reviewed HR 40s on monitor. Labs reviewed, -2.3L for admission, troponin 0.309, Crt 1.0. Received 4 doses of lasix since admission not on any scheduled currently. Previously followed by [facility] had LHC done in 21', she is unsure of results. will get records. Nuc stress planned for tomorrow    7/16/24 Pt seen and examined today, does not feel well. Has lots of outside stressors,  is sick. Planned on nuc stress today EKG with intermittent 2:1 AVB, pt feeling anxious will reschedule once diuresed. BNP yesterday 2727, needs diuresis.     7/17/24 pt seen and examined today, doing better off oxygen during exam sitting up in chair. Diuresing well. Labs reviewed, chart reviewed. No acute CV events reported. Tele with occ 2:1AVB will get EP consult    7/18/24-Patient seen and examined today, sitting up in bedside chair. Feeling better, less SOB.  Continues to diurese well, BNP trending down. No CP. HR stable during exam. EP on board, CRT-P planned once euvolemic. Creatinine stable.     7/19/24 pt seen and examined today sitting up in bedside chair feeling better. -4L for admission. SB on tele, CRT-P planned with EP once diuresed. Crt 1.0 today    07/20/2024  Improved sob. Remain ben and AV dissociation, no dizziness faint and PND. Cr 1.0 stable I&O -4.4 liter since admission    07/21/2024  On tele now 2:1 AV conduction, Cr stable, good UOP. SOB improved. No dizziness faint    7/22/2024  -Patient seen and examined in room, reports improvement in shortness of breath symptoms since IV diuresis. Labs reviewed, K+ 4.3, Cr 1.2, Na 140. Plan for tentative CRT P on Wednesday.     7/23/24-Patient seen and examined today. No AEON. SOB improved. Labs reviewed. Creatinine stable. BNP trending down. CRT-P planned tmw, keep NPO after MN.    7/24/24-Patient seen and examined today, resting in bed. Feels well overall. CV wise, remains stable. Labs reviewed. CRT-P planned today.        Review of Systems   Constitutional: Negative.   HENT: Negative.     Eyes: Negative.    Cardiovascular:  Positive for dyspnea on exertion (improved).   Respiratory:  Positive for shortness of breath (improved).    Endocrine: Negative.    Hematologic/Lymphatic: Negative.    Skin: Negative.    Musculoskeletal:  Positive for arthritis.   Gastrointestinal: Negative.    Genitourinary: Negative.    Neurological: Negative.    Psychiatric/Behavioral: Negative.     Allergic/Immunologic: Negative.      Objective:     Vital Signs (Most Recent):  Temp: 98.8 °F (37.1 °C) (07/24/24 0754)  Pulse: (!) 53 (07/24/24 0949)  Resp: 18 (07/24/24 0754)  BP: (!) 154/67 (07/24/24 0754)  SpO2: 96 % (07/24/24 0754) Vital Signs (24h Range):  Temp:  [98.1 °F (36.7 °C)-98.8 °F (37.1 °C)] 98.8 °F (37.1 °C)  Pulse:  [41-53] 53  Resp:  [17-19] 18  SpO2:  [92 %-98 %] 96 %  BP: (126-180)/(52-81) 154/67     Weight: 65.3 kg (143  "lb 15.4 oz)  Body mass index is 20.66 kg/m².     SpO2: 96 %       No intake or output data in the 24 hours ending 07/24/24 1012    Lines/Drains/Airways       Peripheral Intravenous Line  Duration                  Peripheral IV - Single Lumen 07/20/24 1910 22 G Left Hand 3 days                       Physical Exam  Vitals and nursing note reviewed.   Constitutional:       General: She is not in acute distress.     Appearance: Normal appearance. She is well-developed. She is not diaphoretic.   HENT:      Head: Normocephalic and atraumatic.   Eyes:      General:         Right eye: No discharge.         Left eye: No discharge.      Pupils: Pupils are equal, round, and reactive to light.   Cardiovascular:      Rate and Rhythm: Regular rhythm. Bradycardia present.      Heart sounds: Normal heart sounds, S1 normal and S2 normal. No murmur heard.  Pulmonary:      Effort: Pulmonary effort is normal. No respiratory distress.      Breath sounds: Normal breath sounds.   Abdominal:      General: There is no distension.   Musculoskeletal:      Right lower leg: No edema.      Left lower leg: No edema.   Skin:     General: Skin is warm and dry.      Findings: No erythema.   Neurological:      Mental Status: She is alert and oriented to person, place, and time.   Psychiatric:         Mood and Affect: Mood normal.         Behavior: Behavior normal.            Significant Labs: CMP   Recent Labs   Lab 07/23/24 0313 07/24/24  0425     139 142  142   K 3.6  3.6 3.7  3.7     104 106  106   CO2 26  26 25  25   GLU 92  92 91  91   BUN 46*  46* 41*  41*   CREATININE 1.1  1.1 1.1  1.1   CALCIUM 8.3*  8.3* 8.5*  8.5*   PROT 4.9* 5.0*   ALBUMIN 2.9* 2.9*   BILITOT 0.6 0.7   ALKPHOS 72 68   AST 20 19   ALT 19 16   ANIONGAP 9  9 11  11   , CBC   Recent Labs   Lab 07/23/24  0314 07/24/24  0425   WBC 4.45 4.17   HGB 10.4* 10.8*   HCT 31.2* 32.7*    168   , Troponin No results for input(s): "TROPONINI" in the " last 48 hours., and All pertinent lab results from the last 24 hours have been reviewed.    Significant Imaging: Echocardiogram: Transthoracic echo (TTE) complete ([facility] Only):   Results for orders placed or performed during the hospital encounter of 07/13/24   Echo   Result Value Ref Range    BSA 1.86 m2    LVIDd 4.20 3.5 - 6.0 cm    LV Systolic Volume 47.52 mL    LV Systolic Volume Index 25.4 mL/m2    LVIDs 3.40 2.1 - 4.0 cm    LV Diastolic Volume 78.48 mL    LV Diastolic Volume Index 41.97 mL/m2    Left Ventricular End Systolic Volume by Teichholz Method 47.52 mL    Left Ventricular End Diastolic Volume by Teichholz Method 78.48 mL    IVS 1.53 (A) 0.6 - 1.1 cm    LVOT diameter 2.07 cm    LVOT area 3.4 cm2    FS 19 (A) 28 - 44 %    Left Ventricle Relative Wall Thickness 0.70 cm    Posterior Wall 1.47 (A) 0.6 - 1.1 cm    LV mass 249.50 g    LV Mass Index 133 g/m2    MV Peak E Bobby 1.00 m/s    TDI LATERAL 0.09 m/s    TDI SEPTAL 0.11 m/s    E/E' ratio 10.00 m/s    MV Peak A Bobby 0.83 m/s    TR Max Bobby 3.24 m/s    E/A ratio 1.20     IVRT 87.54 msec    E wave deceleration time 256.49 msec    LV SEPTAL E/E' RATIO 9.09 m/s    LV LATERAL E/E' RATIO 11.11 m/s    LA size 4.89 cm    Left Atrium Minor Axis 6.92 cm    Left Atrium Major Axis 7.90 cm    RA Major Axis 6.58 cm    AV regurgitation pressure 1/2 time [id number] ms    AR Max Bobby 4.16 m/s    AV mean gradient 11 mmHg    AV peak gradient 20 mmHg    Ao peak bobby 2.25 m/s    Ao VTI 59.70 cm    MV stenosis pressure 1/2 time 74.38 ms    MV valve area p 1/2 method 2.96 cm2    Triscuspid Valve Regurgitation Peak Gradient 42 mmHg    PV PEAK VELOCITY 1.41 m/s    PV peak gradient 8 mmHg    Pulmonary Valve Mean Velocity 0.91 m/s    STJ 3.00 cm    IVC diameter 1.08 cm    Mean e' 0.10 m/s    ZLVIDS 0.44     ZLVIDD -2.16     EF 55 %    TV resting pulmonary artery pressure 45 mmHg    RV TB RVSP 6 mmHg    Est. RA pres 3 mmHg    Narrative      Left Ventricle: The left ventricle is  normal in size. Normal wall   thickness. There is concentric hypertrophy. There is normal systolic   function. Ejection fraction by visual approximation is 55%.    Right Ventricle: Normal right ventricular cavity size. Wall thickness   is normal. Systolic function is normal.    Aortic Valve: There is mild aortic regurgitation with a centrally   directed jet.    Pulmonary Artery: The estimated pulmonary artery systolic pressure is   45 mmHg.    IVC/SVC: Normal venous pressure at 3 mmHg.     , EKG: Reviewed, and X-Ray: CXR: X-Ray Chest 1 View (CXR):   Results for orders placed or performed during the hospital encounter of 07/13/24   X-Ray Chest 1 View    Narrative    EXAMINATION:  XR CHEST 1 VIEW    CLINICAL HISTORY:  shortness of breath;    TECHNIQUE:  Single frontal portable view of the chest was performed.    COMPARISON:  Yesterday    FINDINGS:  Mediastinum rotated.  Pulmonary interstitial markings diminishing.  Minimal blunting of the costophrenic angles redemonstrated similar.      Impression    Diminishing vascular congestion      Electronically signed by: Sherie Carty  Date:    07/14/2024  Time:    17:30    and X-Ray Chest PA and Lateral (CXR): No results found for this visit on 07/13/24.  Assessment and Plan:   Patient who presents with acute CHF/AV dissociation. Stable. Continue same mgmt. CRT-P planned today.    * CHF (congestive heart failure)  BNP 2367, received IV lasix x4. On nasal cannula  GDMT ARB, not on BB given bradycardia  Kidney function stable  Repeat BNP pending  Echo yesterday EF 55%  Nuc stress tomorrow  F/u in [facility]    7/16/24  Hold off on nuc stress today, diurese  BNP elevated  IV diuresis cont ARB  Strict I/Os monitor renal function    7/17/24  Cont IV diuresis, ARB    7/18/24  -Improving, BNP trending down  -Continue IV diuresis for additional day  -Continue ARB    07/20/2024  Continue lasix 40 mg ivp bid    07/21/2024  Continue lasix     7/22/2024  -Avoid AV giacomo agents given  bradycardia  -Continue IV lasix 40 mg BID, Losartan  -Dash diet 2 gm sodium restriction  -1500 ml fluid restriction  -Update BNP in AM    7/23/24  -BNP trending down, CV wise remains stable  -Continue IV Lasix, ARB, hydralazine  -CRT-P planned tmw AM, keep NPO after MN    7/24/24  -Stable, continue ARB, hydralazine, IV Lasix  -CRT-P today by Dr. Rossi    Elevated troponin  -OP MPI stress test recommended    Bradycardia  HR 40s with PVCs, asymptomatic  Avoid AV giacomo blocking agents  F/u with primary cardiologist to monitor  Home med list with only HCTZ    7/16/24  EKG during stress today with int 2:1 AVB    7/17/24  EP consulted given int 2:1AVB, AV disassociation   Appreciate recs    7/18/24  -EP on board, CRT-P planned once more euvolemic    07/20/2024  Remains ben and AV dissociation  CRT-P early next week per EP    07/21/2024  Continue CHF Rx  CRT-P early next week per EP     7/23/24  -CRT-P planned tmw, keep NPO after MN    7/24/24  -CRT-P planned today      Acute hypoxemic respiratory failure  -Secondary to CHF, improved with IV diureiss     Hypertension  -Titrate medications, cont ARB    07/20/2024  Continue hydralazine and ARB  Avoid avn blocker due to ben and AV dissociation      SOB (shortness of breath)  Improving since admission  BNP pending  Wean O2 as tolerated    7/16/24  BNP still elevated  Diurese    7/18/24  -Improving, continue IV Lasix        VTE Risk Mitigation (From admission, onward)           Ordered     enoxaparin injection 40 mg  Daily         07/13/24 2334     IP VTE HIGH RISK PATIENT  Once         07/13/24 2334     Place sequential compression device  Until discontinued         07/13/24 2334                    [place]anti [place] PA-C  Cardiology  [facility]

## 2024-07-24 NOTE — PLAN OF CARE
Bed mobility SBA. Toilet transfer CGA. Gait trained with RW 2x100 feet CGA with standing rest breaks. Transfer to bedside chair CGA.    Rec moderate intensity therapy at PR

## 2024-07-24 NOTE — SUBJECTIVE & OBJECTIVE
Review of Systems   Constitutional: Negative.   HENT: Negative.     Eyes: Negative.    Cardiovascular:  Positive for dyspnea on exertion (improved).   Respiratory:  Positive for shortness of breath (improved).    Endocrine: Negative.    Hematologic/Lymphatic: Negative.    Skin: Negative.    Musculoskeletal:  Positive for arthritis.   Gastrointestinal: Negative.    Genitourinary: Negative.    Neurological: Negative.    Psychiatric/Behavioral: Negative.     Allergic/Immunologic: Negative.      Objective:     Vital Signs (Most Recent):  Temp: 98.8 °F (37.1 °C) (07/24/24 0754)  Pulse: (!) 53 (07/24/24 0949)  Resp: 18 (07/24/24 0754)  BP: (!) 154/67 (07/24/24 0754)  SpO2: 96 % (07/24/24 0754) Vital Signs (24h Range):  Temp:  [98.1 °F (36.7 °C)-98.8 °F (37.1 °C)] 98.8 °F (37.1 °C)  Pulse:  [41-53] 53  Resp:  [17-19] 18  SpO2:  [92 %-98 %] 96 %  BP: (126-180)/(52-81) 154/67     Weight: 65.3 kg (143 lb 15.4 oz)  Body mass index is 20.66 kg/m².     SpO2: 96 %       No intake or output data in the 24 hours ending 07/24/24 1012    Lines/Drains/Airways       Peripheral Intravenous Line  Duration                  Peripheral IV - Single Lumen 07/20/24 1910 22 G Left Hand 3 days                       Physical Exam  Vitals and nursing note reviewed.   Constitutional:       General: She is not in acute distress.     Appearance: Normal appearance. She is well-developed. She is not diaphoretic.   HENT:      Head: Normocephalic and atraumatic.   Eyes:      General:         Right eye: No discharge.         Left eye: No discharge.      Pupils: Pupils are equal, round, and reactive to light.   Cardiovascular:      Rate and Rhythm: Regular rhythm. Bradycardia present.      Heart sounds: Normal heart sounds, S1 normal and S2 normal. No murmur heard.  Pulmonary:      Effort: Pulmonary effort is normal. No respiratory distress.      Breath sounds: Normal breath sounds.   Abdominal:      General: There is no distension.   Musculoskeletal:  "     Right lower leg: No edema.      Left lower leg: No edema.   Skin:     General: Skin is warm and dry.      Findings: No erythema.   Neurological:      Mental Status: She is alert and oriented to person, place, and time.   Psychiatric:         Mood and Affect: Mood normal.         Behavior: Behavior normal.            Significant Labs: CMP   Recent Labs   Lab 07/23/24 0313 07/24/24  0425     139 142  142   K 3.6  3.6 3.7  3.7     104 106  106   CO2 26  26 25  25   GLU 92  92 91  91   BUN 46*  46* 41*  41*   CREATININE 1.1  1.1 1.1  1.1   CALCIUM 8.3*  8.3* 8.5*  8.5*   PROT 4.9* 5.0*   ALBUMIN 2.9* 2.9*   BILITOT 0.6 0.7   ALKPHOS 72 68   AST 20 19   ALT 19 16   ANIONGAP 9  9 11  11   , CBC   Recent Labs   Lab 07/23/24 0314 07/24/24  0425   WBC 4.45 4.17   HGB 10.4* 10.8*   HCT 31.2* 32.7*    168   , Troponin No results for input(s): "TROPONINI" in the last 48 hours., and All pertinent lab results from the last 24 hours have been reviewed.    Significant Imaging: Echocardiogram: Transthoracic echo (TTE) complete (Cupid Only):   Results for orders placed or performed during the hospital encounter of 07/13/24   Echo   Result Value Ref Range    BSA 1.86 m2    LVIDd 4.20 3.5 - 6.0 cm    LV Systolic Volume 47.52 mL    LV Systolic Volume Index 25.4 mL/m2    LVIDs 3.40 2.1 - 4.0 cm    LV Diastolic Volume 78.48 mL    LV Diastolic Volume Index 41.97 mL/m2    Left Ventricular End Systolic Volume by Teichholz Method 47.52 mL    Left Ventricular End Diastolic Volume by Teichholz Method 78.48 mL    IVS 1.53 (A) 0.6 - 1.1 cm    LVOT diameter 2.07 cm    LVOT area 3.4 cm2    FS 19 (A) 28 - 44 %    Left Ventricle Relative Wall Thickness 0.70 cm    Posterior Wall 1.47 (A) 0.6 - 1.1 cm    LV mass 249.50 g    LV Mass Index 133 g/m2    MV Peak E Bobby 1.00 m/s    TDI LATERAL 0.09 m/s    TDI SEPTAL 0.11 m/s    E/E' ratio 10.00 m/s    MV Peak A Bobby 0.83 m/s    TR Max Bobby 3.24 m/s    E/A ratio 1.20  "    IVRT 87.54 msec    E wave deceleration time 256.49 msec    LV SEPTAL E/E' RATIO 9.09 m/s    LV LATERAL E/E' RATIO 11.11 m/s    LA size 4.89 cm    Left Atrium Minor Axis 6.92 cm    Left Atrium Major Axis 7.90 cm    RA Major Axis 6.58 cm    AV regurgitation pressure 1/2 time 484.964915999218823 ms    AR Max Bobby 4.16 m/s    AV mean gradient 11 mmHg    AV peak gradient 20 mmHg    Ao peak bobby 2.25 m/s    Ao VTI 59.70 cm    MV stenosis pressure 1/2 time 74.38 ms    MV valve area p 1/2 method 2.96 cm2    Triscuspid Valve Regurgitation Peak Gradient 42 mmHg    PV PEAK VELOCITY 1.41 m/s    PV peak gradient 8 mmHg    Pulmonary Valve Mean Velocity 0.91 m/s    STJ 3.00 cm    IVC diameter 1.08 cm    Mean e' 0.10 m/s    ZLVIDS 0.44     ZLVIDD -2.16     EF 55 %    TV resting pulmonary artery pressure 45 mmHg    RV TB RVSP 6 mmHg    Est. RA pres 3 mmHg    Narrative      Left Ventricle: The left ventricle is normal in size. Normal wall   thickness. There is concentric hypertrophy. There is normal systolic   function. Ejection fraction by visual approximation is 55%.    Right Ventricle: Normal right ventricular cavity size. Wall thickness   is normal. Systolic function is normal.    Aortic Valve: There is mild aortic regurgitation with a centrally   directed jet.    Pulmonary Artery: The estimated pulmonary artery systolic pressure is   45 mmHg.    IVC/SVC: Normal venous pressure at 3 mmHg.     , EKG: Reviewed, and X-Ray: CXR: X-Ray Chest 1 View (CXR):   Results for orders placed or performed during the hospital encounter of 07/13/24   X-Ray Chest 1 View    Narrative    EXAMINATION:  XR CHEST 1 VIEW    CLINICAL HISTORY:  shortness of breath;    TECHNIQUE:  Single frontal portable view of the chest was performed.    COMPARISON:  Yesterday    FINDINGS:  Mediastinum rotated.  Pulmonary interstitial markings diminishing.  Minimal blunting of the costophrenic angles redemonstrated similar.      Impression    Diminishing vascular  congestion      Electronically signed by: Sherie Carty  Date:    07/14/2024  Time:    17:30    and X-Ray Chest PA and Lateral (CXR): No results found for this visit on 07/13/24.

## 2024-07-24 NOTE — ASSESSMENT & PLAN NOTE
BNP 2367, received IV lasix x4. On nasal cannula  GDMT ARB, not on BB given bradycardia  Kidney function stable  Repeat BNP pending  Echo yesterday EF 55%  Nuc stress tomorrow  F/u in [facility]    7/16/24  Hold off on nuc stress today, diurese  BNP elevated  IV diuresis cont ARB  Strict I/Os monitor renal function    7/17/24  Cont IV diuresis, ARB    7/18/24  -Improving, BNP trending down  -Continue IV diuresis for additional day  -Continue ARB    07/20/2024  Continue lasix 40 mg ivp bid    07/21/2024  Continue lasix     7/22/2024  -Avoid AV giacomo agents given bradycardia  -Continue IV lasix 40 mg BID, Losartan  -Dash diet 2 gm sodium restriction  -1500 ml fluid restriction  -Update BNP in AM    7/23/24  -BNP trending down, CV wise remains stable  -Continue IV Lasix, ARB, hydralazine  -CRT-P planned tmw AM, keep NPO after MN    7/24/24  -Stable, continue ARB, hydralazine, IV Lasix  -CRT-P today by Dr. Rossi

## 2024-07-24 NOTE — ASSESSMENT & PLAN NOTE
- Improved  - controlled with PRN pain medication and frequent position changes  - 07/14/24 XRAY left hip negative for fracture or dislocation

## 2024-07-24 NOTE — DISCHARGE INSTRUCTIONS
PACEMAKER/ICD INSTRUCTIONS    SAFETY  BECAUSE OF THE AFTEREFFECTS OF THE SEDATION YOU RECEIVED, WE ADVISE YOU TO REFRAIN FROM THE FOLLOWING ACTIVITIES FOR 24 HOURS.   1. DO NOT DRIVE OR OPERATE MACHINERY FOR 24 HOURS   2. DO NOT OPERATE APPLIANCES IF ALONE.     3. DON'T SIGN LEGAL PAPERS FOR 24 HOURS.     4. WE ADVISE THAT SOMEONE STAY WITH YOU AFTER THE PROCEDURE FOR AT LEAST 8 HOURS    DISCOMFORT: FOR GENERAL DISCOMFORT AT THE PUNCTURE, YOU MAY TAKE TYLENOL, 1-2 TABS EVERY 4-6 HOURS AS NEEDED.  DO NOT TAKE MORE THAN 4000 MG  IN 24 HOUR PERIOD.    ACTIVITY/ WOUND INSTRUCTIONS     AFFECTED ARM  DO NOT LIFT ARM ABOVE SHOULDER HEIGHT FOR 7 DAYS.                                  DO NOT  SWING ARM FOR 6 WEEKS                                DO NOT REMOVE DRESSING.  IT WILL BE REMOVED AT FOLLOW UP APPOINTMENT                                YOU MAY SHOWER IN 48 HOURS.                                  DO NOT REMOVE THE DRESSING FOR SHOWER.                                 USE HIBICLENS SOAP ON CHEST AND NECK AREA  FOR 1 WEEK.                                  FACE AWAY FROM SPRAY WHEN SHOWERING                                                                               REMOVE SLING FOR SHOWER, THEN REAPPLY AFTER SHOWER.                                USE ICE PACK CONTINUOUSLY FOR 48 HOURS .                                WEAR SLING AND SWATHE FOR 72 HOURS AROUND THE CLOCK THEN ONLY WHILE SLEEPING AT NIGHT FOR 1 MONTH             6. CALL YOUR HEALTHCARE PROVIDER IF YOU START TO HAVE THE FOLLOWING SYMPTOMS:                     1. High fever (101 degrees or higher)                 2. Redness,drainage or swelling  or intense pain at the incision site       3.  Drowsiness that doesn't get better       4. Weakness or dizziness that doesn't get  better            5. Repeated vomiting                                                                                                                 NOZIN INSTRUCTIONS     GOAL: TO REDUCE THE RISK OF POST-PROCEDURAL INFECTIONS BY BACTERIA IN THE NASAL CAVITY.  THINK OF IT AS A HAND  FOR YOUR NOSE.     HOW TO USE:  1. SHAKE NOZIN BOTTLE WELL                            2. TAKE A COTTON SWAB AND APPLY FOUR DROPS TO TIP.                            3. INSERT COTTON SWAB INTO ONE NOSTRIL, BEING SURE NOT TO GO DEEPER INTO NOSE THAN THE TIP OF THE SWAB.                            4.SWAB NOSTRIL 6 TIMES CLOCKWISE AND 6 TIMES COUNERCLOCKWISE.  MAKE SURE TO SWAB THE INSIDE FRONT POCKET OF NOSTRIL.                             5. TAKE SWAB OUT AND APPLY 2 DROPS TO THE SAME COTTON TIP.  REPEAT STEPS 3 AND 4 IN THE OTHER NOSTRIL      DO STEPS 1-5 TWICE A DAY FOR 7 DAYS.

## 2024-07-24 NOTE — PROGRESS NOTES
[facility]  [facility] Medicine  Progress Note    Patient Name: Kaleigh Delgado  MRN: [id number]  Patient Class: IP- Inpatient   Admission Date: 7/13/2024  Length of Stay: 11 days  Attending Physician: Zaina Lombardi,*  Primary Care Provider: Aldair Kaur MD        Subjective:     Principal Problem:CHF (congestive heart failure)        HPI:  Kaleigh Delgado is a 74 y.o. female with a PMH  has no past medical history on file. who presented to the ED for further evaluation of worsening dyspnea/shortness of breath and bilateral leg swelling x2 days duration.  Patient denies prior history of CHF and is not currently on home O2 or diuretics.  Given worsening symptoms, patient called EMS and was found to be hypoxic with O2 saturation 88% on room air and was initiated on supplemental oxygen at 6 L via nasal cannula but was titrated up to 15 L non-rebreather.  Patient reported no known alleviating factors, and aggravating factors including laying flat and with exertion.  She denied endorsing any lightheadedness, dizziness, headache, visual changes, fever, chills, sweats, nausea, vomiting, chest pain, abdominal pain, dysuria, hematuria, melena, hematochezia, diarrhea, or onset neurological deficits.  Prior to onset of symptoms, patient reported being in her usual state of health with no other concerns or complaints.  All other review of systems negative except as noted above.  Initial workup in the ED revealed patient to be tachypneic and hypoxic with elevated D-dimer measuring 1.30.  BNP 03/20/2067, troponin 0.090, flu/COVID negative, UA negative for UTI. CTA positive for mild to moderate pulmonary edema, mild left pleural effusion, small right pleural effusion, but negative for pulmonary embolus.  Patient admitted to Hospital Medicine inpatient for continued medical management and workup of new onset heart failure.    PCP: No, Primary Doctor      Overview/Hospital Course:  74 y.o. female with no past medical  history on file admitted for new onset of CHF. Patient denies history of CHF or home oxygen use. Patient is currently for Hctz 12.5 mg daily for blood pressure management. Ed work up revealed elevated D-dimer measuring 1.30. BNP 2367, troponin 0.090, flu/COVID negative, UA negative for UTI. CTA positive for mild to moderate pulmonary edema, mild left pleural effusion, small right pleural effusion, but negative for pulmonary embolus. CXR showed cardiomegaly with perihilar edema. Correlate clinically to CHF. Trace pleural effusions. Correlate clinically to CHF. Echo resulted with a 55% EF. Cardiology following.    Patient scheduled for CRT pacemaker today after lunch with Cardiology. H/H is stable. BNP on 07/23 was 463, improving. Denies chest pain, nausea, vomiting, or abdominal pain this morning. Continue NPO. SOB improving with diuresis. Left groin pain improved significantly and controlled with PRN pain medication. SW working with patient to setup HH upon discharge when deemed appropriate. Will reassess once patient is back from procedure.    Interval History: Denies dizziness, chest pain, or lightheadedness. Improved SOB and left groin pain. On RA. Afebrile. Procedure scheduled for after lunch today per patient.    Review of Systems   Constitutional:  Positive for activity change (improvement), appetite change (improvement) and fatigue (with exertion). Negative for chills and fever.   HENT:  Negative for congestion, facial swelling, nosebleeds, rhinorrhea, sinus pressure, sinus pain, sneezing, sore throat and trouble swallowing.    Eyes:  Negative for photophobia and visual disturbance.   Respiratory:  Positive for cough. Negative for choking, chest tightness, shortness of breath and wheezing.    Cardiovascular:  Negative for chest pain, palpitations and leg swelling.   Gastrointestinal:  Negative for abdominal distention, abdominal pain, constipation, diarrhea, nausea and vomiting.   Endocrine: Negative for cold  intolerance and heat intolerance.   Genitourinary:  Negative for difficulty urinating, dysuria, flank pain, frequency, hematuria and urgency.   Musculoskeletal:  Positive for arthralgias, back pain and myalgias. Negative for gait problem and joint swelling.   Skin:  Positive for color change (BLE discoloration from chronic venous stasis). Negative for pallor, rash and wound.   Allergic/Immunologic: Negative for environmental allergies, food allergies and immunocompromised state.   Neurological:  Positive for weakness (generalized). Negative for dizziness, seizures, syncope, speech difficulty, numbness and headaches.   Hematological:  Bruises/bleeds easily.   Psychiatric/Behavioral:  Negative for agitation and confusion. The patient is not nervous/anxious.      Objective:     Vital Signs (Most Recent):  Temp: 98.8 °F (37.1 °C) (07/24/24 0754)  Pulse: (!) 43 (07/24/24 0900)  Resp: 18 (07/24/24 0754)  BP: (!) 154/67 (07/24/24 0754)  SpO2: 96 % (07/24/24 0754) Vital Signs (24h Range):  Temp:  [98.1 °F (36.7 °C)-98.8 °F (37.1 °C)] 98.8 °F (37.1 °C)  Pulse:  [41-50] 43  Resp:  [17-19] 18  SpO2:  [92 %-98 %] 96 %  BP: (126-180)/(52-81) 154/67     Weight: 65.3 kg (143 lb 15.4 oz)  Body mass index is 20.66 kg/m².  No intake or output data in the 24 hours ending 07/24/24 0942      Physical Exam  Constitutional:       General: She is not in acute distress.     Appearance: Normal appearance. She is not ill-appearing or toxic-appearing.   HENT:      Head: Normocephalic and atraumatic.      Right Ear: External ear normal.      Left Ear: External ear normal.      Nose: Nose normal. No congestion or rhinorrhea.      Mouth/Throat:      Mouth: Mucous membranes are moist.      Pharynx: Oropharynx is clear. No oropharyngeal exudate or posterior oropharyngeal erythema.   Eyes:      Extraocular Movements: Extraocular movements intact.      Conjunctiva/sclera: Conjunctivae normal.      Pupils: Pupils are equal, round, and reactive to  light.   Neck:      Vascular: No carotid bruit.   Cardiovascular:      Rate and Rhythm: Regular rhythm. Bradycardia present.      Pulses: Normal pulses.      Heart sounds: Normal heart sounds. No murmur heard.     No friction rub. No gallop.   Pulmonary:      Effort: Pulmonary effort is normal. No respiratory distress.      Breath sounds: Normal breath sounds. No wheezing, rhonchi or rales.      Comments: Diminished bases bilaterally  Abdominal:      General: Abdomen is flat. Bowel sounds are normal. There is no distension.      Palpations: Abdomen is soft.      Tenderness: There is no abdominal tenderness. There is no guarding or rebound.   Musculoskeletal:         General: No swelling. Normal range of motion.      Cervical back: Normal range of motion and neck supple. No tenderness.      Right lower leg: No edema.      Left lower leg: No edema.   Skin:     General: Skin is warm.      Capillary Refill: Capillary refill takes less than 2 seconds.      Coloration: Skin is not pale.      Findings: Bruising present. No erythema or rash.      Comments: Chronic venous stasis bilaterally   Neurological:      General: No focal deficit present.      Mental Status: She is alert and oriented to person, place, and time.      Sensory: No sensory deficit.      Motor: Weakness (generalized) present.      Coordination: Coordination normal.      Gait: Gait normal.   Psychiatric:         Mood and Affect: Mood normal.         Behavior: Behavior normal.         Thought Content: Thought content normal.         Judgment: Judgment normal.             Significant Labs: All pertinent labs within the past 24 hours have been reviewed.    Significant Imaging: I have reviewed all pertinent imaging results/findings within the past 24 hours.    Assessment/Plan:      * CHF (congestive heart failure)  Patient is identified as having  evidence of new onset  heart failure that is Acute. CHF is currently uncontrolled due to Continued edema of  extremities, Dyspnea not returned to baseline after single doses of IV diuretic, >3 pillow orthopnea, Rales/crackles on pulmonary exam, and Pulmonary edema/pleural effusion on CXR. Latest ECHO performed and demonstrates 55% EF  Continue Furosemide and monitor clinical status closely. Monitor on telemetry. Patient is on CHF pathway.  Monitor strict Is&Os and daily weights.  Place on fluid restriction of 1.5 L. Cardiology has been consulted. Continue to stress to patient importance of self efficacy and  on diet for CHF. Last BNP reviewed- and noted below   Recent Labs   Lab 07/23/24  0314   *     -Cardiology following   -ECHO: Ejection fraction 55%   -Planned Nuc stress test cancelled per cardiology   -IV lasix 40mg q12h   -Room air  -Continue PT/OT to eval and treat; SNF placement pending  -Cardiology consulted Electrophysiology Ayde Dubon NP plan for CRT Pacemaker today after lunch - Continue NPO   - BNP improving    Acute hypoxemic respiratory failure  Patient with Hypoxic Respiratory failure which is Acute.  she is not on home oxygen. Supplemental oxygen was provided and noted-      Signs/symptoms of respiratory failure include- tachypnea, increased work of breathing, and respiratory distress. Contributing diagnoses includes - CHF, Pleural effusion, Pneumonia, and Pulmonary Embolus Labs and images were reviewed. Patient Has not had a recent ABG. Will treat underlying causes and adjust management of respiratory failure as follows:  Plan:  -Improving  -Continue treatment and workup of CHF  -Titrate oxygen requirements as needed  -Incentive spirometry   -Monitor pulse oximetry  -Nohemy prn      SOB (shortness of breath)  - improving   - IV lasix  - supplemental oxygen as needed   - CXR: Diminishing vascular congestion  - CTA: Cardiomegaly. Mild moderate pulmonary edema. Mild left-sided pleural effusion. Small right-sided pleural effusion. Correlate clinically to CHF. No evidence for pulmonary emboli.  Atypical infectious process not excluded.     Hypertension  Chronic, uncontrolled. Latest blood pressure and vitals reviewed-     Temp:  [98.1 °F (36.7 °C)-98.8 °F (37.1 °C)]   Pulse:  [41-50]   Resp:  [17-19]   BP: (126-180)/(52-81)   SpO2:  [92 %-98 %] .   Home meds for hypertension were reviewed and noted below.   Hypertension Medications               hydroCHLOROthiazide (MICROZIDE) 12.5 mg capsule Take 1 capsule by mouth every morning.     While in the hospital, will manage blood pressure as follows; Continue home antihypertensive regimen  - added hydralazine 50 mg q8h  - added losartan 50 mg daily     Will utilize p.r.n. blood pressure medication only if patient's blood pressure greater than 160/100 and she develops symptoms such as worsening chest pain or shortness of breath.      Left groin pain  - Improved  - controlled with PRN pain medication and frequent position changes  - 07/14/24 XRAY left hip negative for fracture or dislocation      Bradycardia  -HR 40s with PVCs, asymptomatic  -Cardiology following   -BNP down trending to 583  -Echo yesterday EF 55%  -Avoid AV giacomo blocking agents  -not a candidate for nuclear stress test due to heart block  -Cardiology consulted Electrophysiology Ayde Dubon NP plan for CRT Pacemaker later today    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)    Avoid AV giacomo agents given bradycardia  -Continue IV lasix 40 mg BID, Losartan  -Dash diet 2 gm sodium restriction  -1500 ml fluid restriction  -    Elevated troponin  Cardiology consulted   Trop 0.090>0.309> 0.203   IV diuresis   BNP down trending, 463 on 07/23        VTE Risk Mitigation (From admission, onward)           Ordered     enoxaparin injection 40 mg  Daily         07/13/24 2334     IP VTE HIGH RISK PATIENT  Once         07/13/24 2334     Place sequential compression device  Until discontinued         07/13/24 2334                    Discharge Planning   DUTCH:      Code Status: Full Code    Is the patient medically ready for discharge?:     Reason for patient still in hospital (select all that apply): Patient trending condition  Discharge Plan A: Skilled Nursing Facility   Discharge Delays: None known at this time              FLYNN Bajwa  Department of Hospital Medicine   [facility]

## 2024-07-25 PROBLEM — R09.02 HYPOXIA: Status: ACTIVE | Noted: 2024-07-25

## 2024-07-25 PROBLEM — I45.89 AV DISSOCIATION: Status: ACTIVE | Noted: 2024-07-25

## 2024-07-25 LAB
ANION GAP SERPL CALC-SCNC: 11 MMOL/L (ref 8–16)
BASOPHILS # BLD AUTO: 0.01 K/UL (ref 0–0.2)
BASOPHILS NFR BLD: 0.2 % (ref 0–1.9)
BUN SERPL-MCNC: 31 MG/DL (ref 8–23)
CALCIUM SERPL-MCNC: 8.5 MG/DL (ref 8.7–10.5)
CHLORIDE SERPL-SCNC: 105 MMOL/L (ref 95–110)
CO2 SERPL-SCNC: 26 MMOL/L (ref 23–29)
CREAT SERPL-MCNC: 1 MG/DL (ref 0.5–1.4)
DIFFERENTIAL METHOD BLD: ABNORMAL
EOSINOPHIL # BLD AUTO: 0 K/UL (ref 0–0.5)
EOSINOPHIL NFR BLD: 0 % (ref 0–8)
ERYTHROCYTE [DISTWIDTH] IN BLOOD BY AUTOMATED COUNT: 12.2 % (ref 11.5–14.5)
EST. GFR  (NO RACE VARIABLE): 59 ML/MIN/1.73 M^2
GLUCOSE SERPL-MCNC: 110 MG/DL (ref 70–110)
HCT VFR BLD AUTO: 32.6 % (ref 37–48.5)
HGB BLD-MCNC: 10.5 G/DL (ref 12–16)
IMM GRANULOCYTES # BLD AUTO: 0.01 K/UL (ref 0–0.04)
IMM GRANULOCYTES NFR BLD AUTO: 0.2 % (ref 0–0.5)
LYMPHOCYTES # BLD AUTO: 0.4 K/UL (ref 1–4.8)
LYMPHOCYTES NFR BLD: 8.7 % (ref 18–48)
MCH RBC QN AUTO: 31.9 PG (ref 27–31)
MCHC RBC AUTO-ENTMCNC: 32.2 G/DL (ref 32–36)
MCV RBC AUTO: 99 FL (ref 82–98)
MONOCYTES # BLD AUTO: 0.3 K/UL (ref 0.3–1)
MONOCYTES NFR BLD: 7.2 % (ref 4–15)
NEUTROPHILS # BLD AUTO: 4 K/UL (ref 1.8–7.7)
NEUTROPHILS NFR BLD: 83.7 % (ref 38–73)
NRBC BLD-RTO: 0 /100 WBC
PLATELET # BLD AUTO: 166 K/UL (ref 150–450)
PMV BLD AUTO: 10.9 FL (ref 9.2–12.9)
POTASSIUM SERPL-SCNC: 4.3 MMOL/L (ref 3.5–5.1)
RBC # BLD AUTO: 3.29 M/UL (ref 4–5.4)
SODIUM SERPL-SCNC: 142 MMOL/L (ref 136–145)
WBC # BLD AUTO: 4.72 K/UL (ref 3.9–12.7)

## 2024-07-25 PROCEDURE — 11000001 HC ACUTE MED/SURG PRIVATE ROOM

## 2024-07-25 PROCEDURE — 25000003 PHARM REV CODE 250: Performed by: INTERNAL MEDICINE

## 2024-07-25 PROCEDURE — 97164 PT RE-EVAL EST PLAN CARE: CPT

## 2024-07-25 PROCEDURE — 63600175 PHARM REV CODE 636 W HCPCS: Performed by: INTERNAL MEDICINE

## 2024-07-25 PROCEDURE — 94761 N-INVAS EAR/PLS OXIMETRY MLT: CPT

## 2024-07-25 PROCEDURE — 97530 THERAPEUTIC ACTIVITIES: CPT

## 2024-07-25 PROCEDURE — 97168 OT RE-EVAL EST PLAN CARE: CPT

## 2024-07-25 PROCEDURE — 36415 COLL VENOUS BLD VENIPUNCTURE: CPT | Performed by: INTERNAL MEDICINE

## 2024-07-25 PROCEDURE — 97535 SELF CARE MNGMENT TRAINING: CPT

## 2024-07-25 PROCEDURE — 80048 BASIC METABOLIC PNL TOTAL CA: CPT | Performed by: INTERNAL MEDICINE

## 2024-07-25 PROCEDURE — 85025 COMPLETE CBC W/AUTO DIFF WBC: CPT | Performed by: INTERNAL MEDICINE

## 2024-07-25 PROCEDURE — 97162 PT EVAL MOD COMPLEX 30 MIN: CPT

## 2024-07-25 RX ORDER — FUROSEMIDE 40 MG/1
40 TABLET ORAL DAILY
Status: DISCONTINUED | OUTPATIENT
Start: 2024-07-25 | End: 2024-07-26 | Stop reason: HOSPADM

## 2024-07-25 RX ORDER — METOPROLOL SUCCINATE 50 MG/1
50 TABLET, EXTENDED RELEASE ORAL DAILY
Status: DISCONTINUED | OUTPATIENT
Start: 2024-07-25 | End: 2024-07-26 | Stop reason: HOSPADM

## 2024-07-25 RX ORDER — HYDRALAZINE HYDROCHLORIDE 25 MG/1
25 TABLET, FILM COATED ORAL EVERY 8 HOURS
Status: DISCONTINUED | OUTPATIENT
Start: 2024-07-25 | End: 2024-07-26

## 2024-07-25 RX ADMIN — CEFAZOLIN 2 G: 2 INJECTION, POWDER, FOR SOLUTION INTRAMUSCULAR; INTRAVENOUS at 05:07

## 2024-07-25 RX ADMIN — HYDROCODONE BITARTRATE AND ACETAMINOPHEN 1 TABLET: 10; 325 TABLET ORAL at 05:07

## 2024-07-25 RX ADMIN — POLYETHYLENE GLYCOL 3350 17 G: 17 POWDER, FOR SOLUTION ORAL at 09:07

## 2024-07-25 RX ADMIN — Medication 6 MG: at 09:07

## 2024-07-25 RX ADMIN — LOSARTAN POTASSIUM 50 MG: 50 TABLET, FILM COATED ORAL at 09:07

## 2024-07-25 RX ADMIN — HYDRALAZINE HYDROCHLORIDE 50 MG: 25 TABLET ORAL at 05:07

## 2024-07-25 RX ADMIN — SENNOSIDES AND DOCUSATE SODIUM 1 TABLET: 50; 8.6 TABLET ORAL at 09:07

## 2024-07-25 RX ADMIN — HYDROCODONE BITARTRATE AND ACETAMINOPHEN 1 TABLET: 10; 325 TABLET ORAL at 09:07

## 2024-07-25 NOTE — ASSESSMENT & PLAN NOTE
Improving since admission  BNP pending  Wean O2 as tolerated    7/16/24  BNP still elevated  Diurese    7/18/24  -Improving, continue IV Lasix    7/25/24  -Lasix switched to po

## 2024-07-25 NOTE — ASSESSMENT & PLAN NOTE
HR 40s with PVCs, asymptomatic  Avoid AV giacomo blocking agents  F/u with primary cardiologist to monitor  Home med list with only HCTZ    7/16/24  EKG during stress today with int 2:1 AVB    7/17/24  EP consulted given int 2:1AVB, AV disassociation   Appreciate recs    7/18/24  -EP on board, CRT-P planned once more euvolemic    07/20/2024  Remains ben and AV dissociation  CRT-P early next week per EP    07/21/2024  Continue CHF Rx  CRT-P early next week per EP     7/23/24  -CRT-P planned tmw, keep NPO after MN    7/24/24  -CRT-P planned today    7/25/24  -s/p CRT-P

## 2024-07-25 NOTE — ASSESSMENT & PLAN NOTE
- Improved  - Continue IV lasix  - supplemental oxygen as needed   - CXR: Diminishing vascular congestion  - CTA: Cardiomegaly. Mild moderate pulmonary edema. Mild left-sided pleural effusion. Small right-sided pleural effusion. Correlate clinically to CHF. No evidence for pulmonary emboli. Atypical infectious process not excluded.

## 2024-07-25 NOTE — ASSESSMENT & PLAN NOTE
- Cardiology following  - CRT pacemaker placed 07/24/24  - Post procedure EKG showed atrial-sensed ventricular-paced rhythm with occasional premature ventricular complexes.

## 2024-07-25 NOTE — PROGRESS NOTES
[facility]  [facility] Medicine  Progress Note    Patient Name: Kaleigh Delgado  MRN: [id number]  Patient Class: IP- Inpatient   Admission Date: 7/13/2024  Length of Stay: 12 days  Attending Physician: Zaina Lombardi,*  Primary Care Provider: Aldair Kaur MD        Subjective:     Principal Problem:CHF (congestive heart failure)        HPI:  Kaleigh Delgado is a 74 y.o. female with a PMH  has no past medical history on file. who presented to the ED for further evaluation of worsening dyspnea/shortness of breath and bilateral leg swelling x2 days duration.  Patient denies prior history of CHF and is not currently on home O2 or diuretics.  Given worsening symptoms, patient called EMS and was found to be hypoxic with O2 saturation 88% on room air and was initiated on supplemental oxygen at 6 L via nasal cannula but was titrated up to 15 L non-rebreather.  Patient reported no known alleviating factors, and aggravating factors including laying flat and with exertion.  She denied endorsing any lightheadedness, dizziness, headache, visual changes, fever, chills, sweats, nausea, vomiting, chest pain, abdominal pain, dysuria, hematuria, melena, hematochezia, diarrhea, or onset neurological deficits.  Prior to onset of symptoms, patient reported being in her usual state of health with no other concerns or complaints.  All other review of systems negative except as noted above.  Initial workup in the ED revealed patient to be tachypneic and hypoxic with elevated D-dimer measuring 1.30.  BNP 03/20/2067, troponin 0.090, flu/COVID negative, UA negative for UTI. CTA positive for mild to moderate pulmonary edema, mild left pleural effusion, small right pleural effusion, but negative for pulmonary embolus.  Patient admitted to Hospital Medicine inpatient for continued medical management and workup of new onset heart failure.    PCP: No, Primary Doctor      Overview/Hospital Course:  74 y.o. female with no past medical  history on file admitted for new onset of CHF. Patient denies history of CHF or home oxygen use. Patient is currently for Hctz 12.5 mg daily for blood pressure management. Ed work up revealed elevated D-dimer measuring 1.30. BNP 2367, troponin 0.090, flu/COVID negative, UA negative for UTI. CTA positive for mild to moderate pulmonary edema, mild left pleural effusion, small right pleural effusion, but negative for pulmonary embolus. CXR showed cardiomegaly with perihilar edema. Correlate clinically to CHF. Trace pleural effusions. Correlate clinically to CHF. Echo resulted with a 55% EF. Cardiology following.    S/p CRT pacemaker placement on 07/24/24. Incision dressing, CDI. No redness or swelling noted. Post-op EKG showed atrial-sensed ventricular-paced rhythm with occasional premature ventricular complexes.  Reported left chest wall soreness from procedure. Controlled with PRN pain medication. Left arm is to be kept immobilized with sling for 5 days. H/H is stable. HR 70s-80s. BNP on 07/23 was 463, improving. Denies nausea, vomiting, or abdominal pain this morning. Tolerating diet. SOB improved with diuresis. Left groin pain improved significantly and controlled with PRN pain medication. Continue PT/OT to treat. SW working with patient to setup HH upon discharge when deemed appropriate.     Interval History: CRT pacemaker placed. Post-op EKG complete. Denies dizziness or lightheadedness. Improved SOB and left groin pain. Afebrile. Reported left chest wall pain from procedure. Controlled with pain medication and ice packs. Continue PT/OT to treat.      Review of Systems   Constitutional:  Positive for fatigue (with exertion). Negative for activity change, appetite change, chills and fever.   HENT:  Negative for congestion, facial swelling, nosebleeds, rhinorrhea, sinus pressure, sinus pain, sneezing, sore throat and trouble swallowing.    Eyes:  Negative for photophobia and visual disturbance.   Respiratory:   Negative for cough, choking, chest tightness, shortness of breath and wheezing.    Cardiovascular:  Positive for chest pain (left chest wall soreness post op). Negative for palpitations and leg swelling.   Gastrointestinal:  Negative for abdominal distention, abdominal pain, constipation, diarrhea, nausea and vomiting.   Endocrine: Negative for cold intolerance and heat intolerance.   Genitourinary:  Negative for difficulty urinating, dysuria, flank pain, frequency, hematuria and urgency.   Musculoskeletal:  Positive for arthralgias, back pain and myalgias. Negative for gait problem and joint swelling.   Skin:  Positive for color change (BLE discoloration from chronic venous stasis). Negative for pallor, rash and wound.   Allergic/Immunologic: Negative for environmental allergies, food allergies and immunocompromised state.   Neurological:  Positive for weakness (generalized). Negative for dizziness, seizures, syncope, speech difficulty, numbness and headaches.   Hematological:  Bruises/bleeds easily.   Psychiatric/Behavioral:  Negative for agitation and confusion. The patient is not nervous/anxious.      Objective:     Vital Signs (Most Recent):  Temp: 98.1 °F (36.7 °C) (07/25/24 0805)  Pulse: 89 (07/25/24 0830)  Resp: 18 (07/25/24 0805)  BP: (!) 138/58 (07/25/24 0805)  SpO2: (!) 91 % (07/25/24 0805) Vital Signs (24h Range):  Temp:  [97.7 °F (36.5 °C)-98.8 °F (37.1 °C)] 98.1 °F (36.7 °C)  Pulse:  [41-89] 89  Resp:  [16-20] 18  SpO2:  [91 %-98 %] 91 %  BP: (111-195)/(54-78) 138/58     Weight: 67.3 kg (148 lb 5.9 oz)  Body mass index is 21.29 kg/m².    Intake/Output Summary (Last 24 hours) at 7/25/2024 0906  Last data filed at 7/25/2024 0747  Gross per 24 hour   Intake 100 ml   Output 1050 ml   Net -950 ml         Physical Exam  Constitutional:       General: She is not in acute distress.     Appearance: Normal appearance. She is not ill-appearing or toxic-appearing.   HENT:      Head: Normocephalic and atraumatic.       Right Ear: External ear normal.      Left Ear: External ear normal.      Nose: Nose normal. No congestion or rhinorrhea.      Mouth/Throat:      Mouth: Mucous membranes are moist.      Pharynx: Oropharynx is clear. No oropharyngeal exudate or posterior oropharyngeal erythema.   Eyes:      Extraocular Movements: Extraocular movements intact.      Conjunctiva/sclera: Conjunctivae normal.      Pupils: Pupils are equal, round, and reactive to light.   Neck:      Vascular: No carotid bruit.   Cardiovascular:      Rate and Rhythm: Normal rate. Rhythm regular.      Pulses: Normal pulses.      Heart sounds: No murmur.      No friction rub. No gallop.   Pulmonary:      Effort: Pulmonary effort is normal. No respiratory distress.      Breath sounds: Normal breath sounds. No wheezing, rhonchi or rales.      Comments: Diminished bases bilaterally  Chest:      Chest wall: Tenderness (left - CRT pacemaker) present.   Abdominal:      General: Abdomen is flat. Bowel sounds are normal. There is no distension.      Palpations: Abdomen is soft.      Tenderness: There is no abdominal tenderness. There is no guarding or rebound.   Musculoskeletal:         General: No swelling. Normal range of motion.      Cervical back: Normal range of motion and neck supple. No tenderness.      Right lower leg: No edema.      Left lower leg: No edema.   Skin:     General: Skin is warm.      Capillary Refill: Capillary refill takes less than 2 seconds.      Coloration: Skin is not pale.      Findings: Bruising present. No erythema or rash.      Comments: Chronic venous stasis bilaterally  Incision to left chest wall - pacemaker   Neurological:      General: No focal deficit present.      Mental Status: She is alert and oriented to person, place, and time.      Sensory: No sensory deficit.      Motor: Weakness (generalized) present.      Coordination: Coordination normal.      Gait: Gait normal.   Psychiatric:         Mood and Affect: Mood normal.          Behavior: Behavior normal.         Thought Content: Thought content normal.         Judgment: Judgment normal.             Significant Labs: All pertinent labs within the past 24 hours have been reviewed.    Significant Imaging: I have reviewed all pertinent imaging results/findings within the past 24 hours.    Assessment/Plan:      * CHF (congestive heart failure)  Patient is identified as having  evidence of new onset  heart failure that is Acute. CHF is currently uncontrolled due to Continued edema of extremities, Dyspnea not returned to baseline after single doses of IV diuretic, >3 pillow orthopnea, Rales/crackles on pulmonary exam, and Pulmonary edema/pleural effusion on CXR. Latest ECHO performed and demonstrates 55% EF  Continue Furosemide and monitor clinical status closely. Monitor on telemetry. Patient is on CHF pathway.  Monitor strict Is&Os and daily weights.  Place on fluid restriction of 1.5 L. Cardiology has been consulted. Continue to stress to patient importance of self efficacy and  on diet for CHF. Last BNP reviewed- and noted below   Recent Labs   Lab 07/23/24  0314   *     -Cardiology following   -ECHO: Ejection fraction 55%   -Planned Nuc stress test cancelled per cardiology   -IV lasix 40mg q12h   -Room air  -Continue PT/OT to treat  -Cardiology consulted Electrophysiology Ayde Dubon NP for CRT Pacemaker placed on 07/248/24    AV block, Mobitz II  - Cardiology following  - CRT pacemaker placed 07/24/24  - Post procedure EKG showed atrial-sensed ventricular-paced rhythm with occasional premature ventricular complexes.        Acute hypoxemic respiratory failure  Patient with Hypoxic Respiratory failure which is Acute.  she is not on home oxygen. Supplemental oxygen was provided and noted-      Signs/symptoms of respiratory failure include- tachypnea, increased work of breathing, and respiratory distress. Contributing diagnoses includes - CHF, Pleural effusion, Pneumonia,  and Pulmonary Embolus Labs and images were reviewed. Patient Has not had a recent ABG. Will treat underlying causes and adjust management of respiratory failure as follows:  Plan:  -Improving  -Continue treatment and workup of CHF  -Titrate oxygen requirements as needed  -Incentive spirometry   -Monitor pulse oximetry  -Nohemy prn      SOB (shortness of breath)  - Improved  - Continue IV lasix  - supplemental oxygen as needed   - CXR: Diminishing vascular congestion  - CTA: Cardiomegaly. Mild moderate pulmonary edema. Mild left-sided pleural effusion. Small right-sided pleural effusion. Correlate clinically to CHF. No evidence for pulmonary emboli. Atypical infectious process not excluded.     Hypertension  Chronic, uncontrolled. Latest blood pressure and vitals reviewed-     Temp:  [97.7 °F (36.5 °C)-98.8 °F (37.1 °C)]   Pulse:  [41-89]   Resp:  [16-20]   BP: (111-195)/(54-78)   SpO2:  [91 %-98 %] .   Home meds for hypertension were reviewed and noted below.   Hypertension Medications               hydroCHLOROthiazide (MICROZIDE) 12.5 mg capsule Take 1 capsule by mouth every morning.     While in the hospital, will manage blood pressure as follows; Continue home antihypertensive regimen  - added hydralazine 50 mg q8h  - added losartan 50 mg daily     Will utilize p.r.n. blood pressure medication only if patient's blood pressure greater than 160/100 and she develops symptoms such as worsening chest pain or shortness of breath.      Left groin pain  - Improved  - controlled with PRN pain medication and frequent position changes  - 07/14/24 XRAY left hip negative for fracture or dislocation      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)    Avoid AV giacomo agents given bradycardia  -Continue IV lasix 40 mg BID, Losartan  -Dash diet 2 gm sodium restriction  -1500 ml fluid restriction  -    Elevated troponin  Cardiology consulted   Trop 0.090>0.309> 0.203   IV diuresis   BNP down trending, 463  on 07/23      Bradycardia  -HR 40s with PVCs, asymptomatic  -Cardiology following   -BNP down trending to 583  -Echo yesterday EF 55%  -Avoid AV giacomo blocking agents  -not a candidate for nuclear stress test due to heart block  -Cardiology consulted Electrophysiology Ayde Dubon NP plan for CRT Pacemaker later today      VTE Risk Mitigation (From admission, onward)           Ordered     IP VTE HIGH RISK PATIENT  Once         07/13/24 2334     Place sequential compression device  Until discontinued         07/13/24 2334                    Discharge Planning   DUTCH:      Code Status: Full Code   Is the patient medically ready for discharge?:     Reason for patient still in hospital (select all that apply): Patient trending condition  Discharge Plan A: Skilled Nursing Facility   Discharge Delays: None known at this time              VIET Bajwa-C  Department of Hospital Medicine   [facility]

## 2024-07-25 NOTE — ASSESSMENT & PLAN NOTE
Patient is identified as having  evidence of new onset  heart failure that is Acute. CHF is currently uncontrolled due to Continued edema of extremities, Dyspnea not returned to baseline after single doses of IV diuretic, >3 pillow orthopnea, Rales/crackles on pulmonary exam, and Pulmonary edema/pleural effusion on CXR. Latest ECHO performed and demonstrates 55% EF  Continue Furosemide and monitor clinical status closely. Monitor on telemetry. Patient is on CHF pathway.  Monitor strict Is&Os and daily weights.  Place on fluid restriction of 1.5 L. Cardiology has been consulted. Continue to stress to patient importance of self efficacy and  on diet for CHF. Last BNP reviewed- and noted below   Recent Labs   Lab 07/23/24  0314   *     -Cardiology following   -ECHO: Ejection fraction 55%   -Planned Nuc stress test cancelled per cardiology   -IV lasix 40mg q12h   -Room air  -Continue PT/OT to treat  -Cardiology consulted Electrophysiology Ayde Dubon NP for CRT Pacemaker placed on 07/248/24

## 2024-07-25 NOTE — PLAN OF CARE
Updated patient on plan of care. Instructed patient to use call light for assistance, call light in reach. Hourly rounding performed. Vitals q4 hours. Education provided, questions answered/encouraged. Chart check complete. Arm sling on L arm. V paced 100%, A paced on demand, occasional PVCs.    Problem: Adult Inpatient Plan of Care  Goal: Plan of Care Review  Outcome: Progressing  Goal: Patient-Specific Goal (Individualized)  Outcome: Progressing  Goal: Absence of Hospital-Acquired Illness or Injury  Outcome: Progressing  Goal: Optimal Comfort and Wellbeing  Outcome: Progressing  Goal: Readiness for Transition of Care  Outcome: Progressing     Problem: Skin Injury Risk Increased  Goal: Skin Health and Integrity  Outcome: Progressing     Problem: Fatigue  Goal: Improved Activity Tolerance  Outcome: Progressing     Problem: Heart Failure  Goal: Optimal Coping  Outcome: Progressing  Goal: Optimal Cardiac Output  Outcome: Progressing  Goal: Stable Heart Rate and Rhythm  Outcome: Progressing  Goal: Optimal Functional Ability  Outcome: Progressing  Goal: Fluid and Electrolyte Balance  Outcome: Progressing  Goal: Improved Oral Intake  Outcome: Progressing  Goal: Effective Oxygenation and Ventilation  Outcome: Progressing  Goal: Effective Breathing Pattern During Sleep  Outcome: Progressing     Problem: Fall Injury Risk  Goal: Absence of Fall and Fall-Related Injury  Outcome: Progressing

## 2024-07-25 NOTE — ASSESSMENT & PLAN NOTE
Chronic, uncontrolled. Latest blood pressure and vitals reviewed-     Temp:  [97.7 °F (36.5 °C)-98.8 °F (37.1 °C)]   Pulse:  [41-89]   Resp:  [16-20]   BP: (111-195)/(54-78)   SpO2:  [91 %-98 %] .   Home meds for hypertension were reviewed and noted below.   Hypertension Medications               hydroCHLOROthiazide (MICROZIDE) 12.5 mg capsule Take 1 capsule by mouth every morning.     While in the hospital, will manage blood pressure as follows; Continue home antihypertensive regimen  - added hydralazine 50 mg q8h  - added losartan 50 mg daily     Will utilize p.r.n. blood pressure medication only if patient's blood pressure greater than 160/100 and she develops symptoms such as worsening chest pain or shortness of breath.

## 2024-07-25 NOTE — PLAN OF CARE
07/25/24 1512   Post-Acute Status   Post-Acute Authorization Placement   Post-Acute Placement Status Pending payor review/awaiting authorization (if required)   Discharge Delays None known at this time   Discharge Plan   Discharge Plan A Skilled Nursing Facility   Discharge Plan B Skilled Nursing Facility     [facility] and [facility] do not have availability. [facility] clinically accepting. Pt does not want additional referrals sent and agreeable to [facility] submitting for auth.  [facility] liaison, Jose, updated and will submit for auth.  Pending 142 received.

## 2024-07-25 NOTE — ASSESSMENT & PLAN NOTE
BNP 2367, received IV lasix x4. On nasal cannula  GDMT ARB, not on BB given bradycardia  Kidney function stable  Repeat BNP pending  Echo yesterday EF 55%  Nuc stress tomorrow  F/u in [facility]    7/16/24  Hold off on nuc stress today, diurese  BNP elevated  IV diuresis cont ARB  Strict I/Os monitor renal function    7/17/24  Cont IV diuresis, ARB    7/18/24  -Improving, BNP trending down  -Continue IV diuresis for additional day  -Continue ARB    07/20/2024  Continue lasix 40 mg ivp bid    07/21/2024  Continue lasix     7/22/2024  -Avoid AV giacomo agents given bradycardia  -Continue IV lasix 40 mg BID, Losartan  -Dash diet 2 gm sodium restriction  -1500 ml fluid restriction  -Update BNP in AM    7/23/24  -BNP trending down, CV wise remains stable  -Continue IV Lasix, ARB, hydralazine  -CRT-P planned tmw AM, keep NPO after MN    7/24/24  -Stable, continue ARB, hydralazine, IV Lasix  [facility]    7/25/24  -Stable, s/p CRT-P placement by Dr. Castro  -Continue ARB, hydralazine  -Lasix switched to po  -BB added

## 2024-07-25 NOTE — PLAN OF CARE
PT RE-EVAL complete. Required CGA for bed mobility, MIN A for transfer using quad cane. Recommending moderate intensity therapy upon d/c.

## 2024-07-25 NOTE — PROGRESS NOTES
[facility]  Cardiology  Progress Note    Patient Name: Kaleigh Delgado  MRN: [id number]  Admission Date: 7/13/2024  Hospital Length of Stay: 12 days  Code Status: Full Code   Attending Physician: Zaina Lombardi,*   Primary Care Physician: Aldair Kaur MD  Expected Discharge Date:   Principal Problem:CHF (congestive heart failure)    Subjective:     Hospital Course:   Patient is a 74 year old female with a past medical history of hypertension, history of CHF, followed in the past by [facility], apparent history of afib, but now in sinus rhythm. BNP greater than 2000. Chest x-ray shows CHF. EKG shows sinus bradycardia with PAC's. There is a mild increase in troponin 0.09. Patient states history of heart cath by [facility] in 2021 that was treated medically.    Recommend continue Iv diuresis, check echocardiogram, and blood pressure control. Patient will eventually need nuclear stress test. We will need to obtain cath report from [facility].        7/15/24 pt seen and examined today, resting in bed. On supplemental O2 via NC. Denies any CP at this time. C/o aches and fatigue, tele reviewed HR 40s on monitor. Labs reviewed, -2.3L for admission, troponin 0.309, Crt 1.0. Received 4 doses of lasix since admission not on any scheduled currently. Previously followed by [facility] had LHC done in 21', she is unsure of results. will get records. Nuc stress planned for tomorrow    7/16/24 Pt seen and examined today, does not feel well. Has lots of outside stressors,  is sick. Planned on nuc stress today EKG with intermittent 2:1 AVB, pt feeling anxious will reschedule once diuresed. BNP yesterday 2727, needs diuresis.     7/17/24 pt seen and examined today, doing better off oxygen during exam sitting up in chair. Diuresing well. Labs reviewed, chart reviewed. No acute CV events reported. Tele with occ 2:1AVB will get EP consult    7/18/24-Patient seen and examined today, sitting up in bedside chair. Feeling better, less SOB.  Continues to diurese well, BNP trending down. No CP. HR stable during exam. EP on board, CRT-P planned once euvolemic. Creatinine stable.     7/19/24 pt seen and examined today sitting up in bedside chair feeling better. -4L for admission. SB on tele, CRT-P planned with EP once diuresed. Crt 1.0 today    07/20/2024  Improved sob. Remain ben and AV dissociation, no dizziness faint and PND. Cr 1.0 stable I&O -4.4 liter since admission    07/21/2024  On tele now 2:1 AV conduction, Cr stable, good UOP. SOB improved. No dizziness faint    7/22/2024  -Patient seen and examined in room, reports improvement in shortness of breath symptoms since IV diuresis. Labs reviewed, K+ 4.3, Cr 1.2, Na 140. Plan for tentative CRT P on Wednesday.     7/23/24-Patient seen and examined today. No AEON. SOB improved. Labs reviewed. Creatinine stable. BNP trending down. CRT-P planned tmw, keep NPO after MN.    7/24/24-Patient seen and examined today, resting in bed. Feels well overall. CV wise, remains stable. Labs reviewed. CRT-P planned today.    7/25/24-Patient seen and examined today, sitting up in bed, s/p CRT-P yesterday. Feels ok. Admits to some site soreness and right hip pain. SOB improved/stable. Labs reviewed. Meds adjusted.        Review of Systems   Constitutional: Positive for malaise/fatigue.   HENT: Negative.     Eyes: Negative.    Cardiovascular:  Positive for chest pain (incisional) and dyspnea on exertion (improved).   Respiratory: Negative.     Endocrine: Negative.    Hematologic/Lymphatic: Negative.    Skin: Negative.    Musculoskeletal:  Positive for arthritis and joint pain.   Gastrointestinal: Negative.    Genitourinary: Negative.    Neurological: Negative.    Psychiatric/Behavioral: Negative.     Allergic/Immunologic: Negative.      Objective:     Vital Signs (Most Recent):  Temp: 98.1 °F (36.7 °C) (07/25/24 0805)  Pulse: 89 (07/25/24 0830)  Resp: 18 (07/25/24 0805)  BP: (!) 138/58 (07/25/24 0805)  SpO2: (!) 91 %  (07/25/24 0805) Vital Signs (24h Range):  Temp:  [97.7 °F (36.5 °C)-98.8 °F (37.1 °C)] 98.1 °F (36.7 °C)  Pulse:  [41-89] 89  Resp:  [16-20] 18  SpO2:  [91 %-98 %] 91 %  BP: (111-195)/(54-78) 138/58     Weight: 67.3 kg (148 lb 5.9 oz)  Body mass index is 21.29 kg/m².     SpO2: (!) 91 %         Intake/Output Summary (Last 24 hours) at 7/25/2024 1027  Last data filed at 7/25/2024 0747  Gross per 24 hour   Intake 100 ml   Output 1050 ml   Net -950 ml       Lines/Drains/Airways       Peripheral Intravenous Line  Duration                  Peripheral IV - Single Lumen 07/24/24 0600 22 G Posterior;Right Hand 1 day         Peripheral IV - Single Lumen 07/24/24 1500 20 G Anterior;Distal;Left Upper Arm <1 day                       Physical Exam  Vitals and nursing note reviewed.   Constitutional:       General: She is not in acute distress.     Appearance: Normal appearance. She is well-developed. She is not diaphoretic.   HENT:      Head: Normocephalic and atraumatic.   Eyes:      General:         Right eye: No discharge.         Left eye: No discharge.      Pupils: Pupils are equal, round, and reactive to light.   Cardiovascular:      Rate and Rhythm: Normal rate and regular rhythm.      Heart sounds: Normal heart sounds, S1 normal and S2 normal. No murmur heard.     Comments: CRT-P site with dressing intact, C/D, no erythema; sling in place LUE  Pulmonary:      Effort: Pulmonary effort is normal.   Abdominal:      General: There is no distension.   Musculoskeletal:      Right lower leg: No edema.      Left lower leg: No edema.   Skin:     General: Skin is warm and dry.      Findings: No erythema.   Neurological:      General: No focal deficit present.      Mental Status: She is alert and oriented to person, place, and time.   Psychiatric:         Mood and Affect: Mood normal.         Behavior: Behavior normal.            Significant Labs: CMP   Recent Labs   Lab 07/24/24  0425 07/25/24  0333     142 142   K 3.7   "3.7 4.3     106 105   CO2 25  25 26   GLU 91  91 110   BUN 41*  41* 31*   CREATININE 1.1  1.1 1.0   CALCIUM 8.5*  8.5* 8.5*   PROT 5.0*  --    ALBUMIN 2.9*  --    BILITOT 0.7  --    ALKPHOS 68  --    AST 19  --    ALT 16  --    ANIONGAP 11  11 11   , CBC   Recent Labs   Lab 07/24/24  0425 07/25/24  0333   WBC 4.17 4.72   HGB 10.8* 10.5*   HCT 32.7* 32.6*    166   , Troponin No results for input(s): "TROPONINI" in the last 48 hours., and All pertinent lab results from the last 24 hours have been reviewed.    Significant Imaging: Echocardiogram: Transthoracic echo (TTE) complete ([facility] Only):   Results for orders placed or performed during the hospital encounter of 07/13/24   Echo   Result Value Ref Range    BSA 1.86 m2    LVIDd 4.20 3.5 - 6.0 cm    LV Systolic Volume 47.52 mL    LV Systolic Volume Index 25.4 mL/m2    LVIDs 3.40 2.1 - 4.0 cm    LV Diastolic Volume 78.48 mL    LV Diastolic Volume Index 41.97 mL/m2    Left Ventricular End Systolic Volume by Teichholz Method 47.52 mL    Left Ventricular End Diastolic Volume by Teichholz Method 78.48 mL    IVS 1.53 (A) 0.6 - 1.1 cm    LVOT diameter 2.07 cm    LVOT area 3.4 cm2    FS 19 (A) 28 - 44 %    Left Ventricle Relative Wall Thickness 0.70 cm    Posterior Wall 1.47 (A) 0.6 - 1.1 cm    LV mass 249.50 g    LV Mass Index 133 g/m2    MV Peak E Bobby 1.00 m/s    TDI LATERAL 0.09 m/s    TDI SEPTAL 0.11 m/s    E/E' ratio 10.00 m/s    MV Peak A Bobby 0.83 m/s    TR Max Bobby 3.24 m/s    E/A ratio 1.20     IVRT 87.54 msec    E wave deceleration time 256.49 msec    LV SEPTAL E/E' RATIO 9.09 m/s    LV LATERAL E/E' RATIO 11.11 m/s    LA size 4.89 cm    Left Atrium Minor Axis 6.92 cm    Left Atrium Major Axis 7.90 cm    RA Major Axis 6.58 cm    AV regurgitation pressure 1/2 time 484.510075619791207 ms    AR Max Bobby 4.16 m/s    AV mean gradient 11 mmHg    AV peak gradient 20 mmHg    Ao peak bobby 2.25 m/s    Ao VTI 59.70 cm    MV stenosis pressure 1/2 time 74.38 ms    " MV valve area p 1/2 method 2.96 cm2    Triscuspid Valve Regurgitation Peak Gradient 42 mmHg    PV PEAK VELOCITY 1.41 m/s    PV peak gradient 8 mmHg    Pulmonary Valve Mean Velocity 0.91 m/s    STJ 3.00 cm    IVC diameter 1.08 cm    Mean e' 0.10 m/s    ZLVIDS 0.44     ZLVIDD -2.16     EF 55 %    TV resting pulmonary artery pressure 45 mmHg    RV TB RVSP 6 mmHg    Est. RA pres 3 mmHg    Narrative      Left Ventricle: The left ventricle is normal in size. Normal wall   thickness. There is concentric hypertrophy. There is normal systolic   function. Ejection fraction by visual approximation is 55%.    Right Ventricle: Normal right ventricular cavity size. Wall thickness   is normal. Systolic function is normal.    Aortic Valve: There is mild aortic regurgitation with a centrally   directed jet.    Pulmonary Artery: The estimated pulmonary artery systolic pressure is   45 mmHg.    IVC/SVC: Normal venous pressure at 3 mmHg.      and EKG: Reviewed  Assessment and Plan:   Patient who presents with acute CHF/AV dissociation. Doing well s/p CRT-P. Meds adjusted, assess response.     * CHF (congestive heart failure)  BNP 2367, received IV lasix x4. On nasal cannula  GDMT ARB, not on BB given bradycardia  Kidney function stable  Repeat BNP pending  Echo yesterday EF 55%  Nuc stress tomorrow  F/u in [facility]    7/16/24  Hold off on nuc stress today, diurese  BNP elevated  IV diuresis cont ARB  Strict I/Os monitor renal function    7/17/24  Cont IV diuresis, ARB    7/18/24  -Improving, BNP trending down  -Continue IV diuresis for additional day  -Continue ARB    07/20/2024  Continue lasix 40 mg ivp bid    07/21/2024  Continue lasix     7/22/2024  -Avoid AV giacomo agents given bradycardia  -Continue IV lasix 40 mg BID, Losartan  -Dash diet 2 gm sodium restriction  -1500 ml fluid restriction  -Update BNP in AM    7/23/24  -BNP trending down, CV wise remains stable  -Continue IV Lasix, ARB, hydralazine  -CRT-P planned tmw AM, keep NPO  after MN    7/24/24  -Stable, continue ARB, hydralazine, IV Lasix  -CRT-P today by Dr. Castro-Shannan    7/25/24  -Stable, s/p CRT-P placement by Dr. Castro  -Continue ARB, hydralazine  -Lasix switched to po  -BB added    AV block, Mobitz II  -s/p CRT-P    Elevated troponin  -OP MPI stress test recommended    Bradycardia  HR 40s with PVCs, asymptomatic  Avoid AV giacomo blocking agents  F/u with primary cardiologist to monitor  Home med list with only HCTZ    7/16/24  EKG during stress today with int 2:1 AVB    7/17/24  EP consulted given int 2:1AVB, AV disassociation   Appreciate recs    7/18/24  -EP on board, CRT-P planned once more euvolemic    07/20/2024  Remains ben and AV dissociation  CRT-P early next week per EP    07/21/2024  Continue CHF Rx  CRT-P early next week per EP     7/23/24  -CRT-P planned tmw, keep NPO after MN    7/24/24  -CRT-P planned today    7/25/24  -s/p CRT-P      Acute hypoxemic respiratory failure  -Secondary to CHF, improved with IV diureiss     Hypertension  -Titrate medications, cont ARB    07/20/2024  Continue hydralazine and ARB  Avoid avn blocker due to ben and AV dissociation    7/25/24  -Meds adjusted today, monitor trend      SOB (shortness of breath)  Improving since admission  BNP pending  Wean O2 as tolerated    7/16/24  BNP still elevated  Diurese    7/18/24  -Improving, continue IV Lasix    7/25/24  -Lasix switched to po        VTE Risk Mitigation (From admission, onward)           Ordered     IP VTE HIGH RISK PATIENT  Once         07/13/24 2334     Place sequential compression device  Until discontinued         07/13/24 2334                    Shanti Lake PA-C  Cardiology  [facility]

## 2024-07-25 NOTE — PLAN OF CARE
SW met with patient at bedside. Pt interested in SNF placement post procedure. Preference noted for [facility] or [facility]. Updated clinicals sent to SNF via [facility]. SW to f/u with pt about decision so auth can be submitted.   Pt provided with CM contact number if needs arise.

## 2024-07-25 NOTE — PT/OT/SLP RE-EVAL
"Occupational Therapy   Re-evaluation    Name: Kaleigh Delgado  MRN: [id number]  Admitting Diagnosis:  CHF (congestive heart failure)  Recent Surgery: Procedure(s) (LRB):  INSERTION, PACEMAKER, BIVENTRICULAR (Left) 1 Day Post-Op    Recommendations:     Discharge Recommendations: Moderate Intensity Therapy  Discharge Equipment Recommendations: to be determined by next level of care  Barriers to discharge:  Decreased caregiver support    Assessment:     Kaleigh Delgado is a 74 y.o. female with a medical diagnosis of CHF (congestive heart failure).  She presents with the following performance deficits affecting function are weakness, impaired endurance, impaired sensation, impaired self care skills, impaired functional mobility, gait instability, impaired balance, decreased upper extremity function, decreased lower extremity function, decreased safety awareness, pain, decreased ROM, impaired cardiopulmonary response to activity.      Rehab Prognosis:  Good; patient would benefit from acute skilled OT services to address these deficits and reach maximum level of function.       Plan:     Patient to be seen 2 x/week to address the above listed problems via self-care/home management, therapeutic activities, therapeutic exercises  Plan of Care Expires: 08/08/24  Plan of Care Reviewed with: patient    Subjective     Chief Complaint: "I'm tired." "This left arm has some soreness."  Patient/Family stated goals: improve strength, mobility, and independence  Pain/Comfort:  Pain Rating 1: 1/10  Location - Side 1: Left  Location - Orientation 1: generalized  Location 1: arm  Pain Addressed 1: Reposition, Distraction, Pre-medicate for activity  Pain Rating Post-Intervention 1: 1/10  Pain Rating 2: 1/10  Location - Side 2: Left  Location - Orientation 2: generalized  Location 2: leg  Pain Addressed 2: Pre-medicate for activity, Reposition, Distraction  Pain Rating Post-Intervention 2: 1/10    Objective:     Communicated with: Nurse and " epic chart review prior to session.  Patient found HOB elevated with: pressure relief boots, SCD, PureWick, peripheral IV, telemetry upon OT entry to room.    General Precautions: Standard, fall, pacemaker  Orthopedic Precautions: LUE non weight bearing  Braces: UE Sling  Respiratory Status: Room air    Occupational Performance:    Bed Mobility:    Patient completed Rolling/Turning to Left with  contact guard assistance  Patient completed Supine to Sit with contact guard assistance  Forward scoot to EOB with Mod A.     Functional Mobility/Transfers:  Patient completed Sit <> Stand Transfer with moderate assistance  with  quad cane   Patient completed Bed <> Chair Transfer using Step Transfer technique with minimum assistance with quad cane  Functional Mobility: Pt declining ambulation at this time.     Activities of Daily Living:  Grooming: setup A. Pt performed oral care, washed face, and brushed hair using RUE while sitting in chair. Pt able to utilize LUE grasp to twist off toothpaste cap and apply toothpaste to toothbrush.  Upper Body Dressing: maximal assistance yeimy gown around back  Lower Body Dressing: total assistance doff pressure relief boots    Cognitive/Visual Perceptual:  Cognitive/Psychosocial Skills:     -       Oriented to: Person, Place, Time, and Situation   -       Follows Commands/attention:Follows multistep  commands  -       Communication: clear/fluent  -       Memory: No Deficits noted  -       Safety awareness/insight to disability: impaired     Physical Exam:  Sensation:    -       Impaired  pt reports LLE is sensitive/painful to touch  Dominant hand:    -       LEFT  Upper Extremity Range of Motion:     -       Right Upper Extremity: WFL  -       Left Upper Extremity: immobilized d/t pacemaker precautions and LUE sling  Upper Extremity Strength:    -       Right Upper Extremity: 4/5 grossly  -       Left Upper Extremity: NT   Strength:    -       Right Upper Extremity: WFL  -        Left Upper Extremity: WFL    [facility] 6 Click:  [facility] Total Score: 13    Treatment & Education:  Educated pt on pacemaker precautions, functional implications, and importance of compliance. Reviewed role of OT in acute setting and benefits of participation. Educated on techniques to use to increase independence and decrease fall risk with functional transfers. Educated on importance of OOB activity and calling for A to transfer back to bed. Encouraged completion of B UE AROM therex throughout the day to tolerance to increase functional strength and activity tolerance. Educated patient on importance of increased tolerance to upright position and direct impact on CV endurance and strength. Patient encouraged to sit up in chair for a minimum of 2 consecutive hours per day. Patient stated understanding and in agreement with POC.     Patient left up in chair with all lines intact, call button in reach, and chair alarm on    GOALS:   Multidisciplinary Problems       Occupational Therapy Goals          Problem: Occupational Therapy    Goal Priority Disciplines Outcome Interventions   Occupational Therapy Goal     OT, PT/OT Progressing    Description: Goals to be met by: 8/8/24     Patient will increase functional independence with ADLs by performing:    UE Dressing with Minimal Assistance.  Grooming while standing at sink with Contact Guard Assistance.  Toileting from toilet with Standby Assistance for hygiene and clothing management.   Toilet transfer to toilet with Contact Guard Assistance with QC.  Upper extremity exercise program x15 reps per handout, with independence.                         History:     History reviewed. No pertinent past medical history.      Past Surgical History:   Procedure Laterality Date    exploratory lap      FIRST RIB REMOVAL      KNEE SURGERY      TONSILLECTOMY      VARICOSE VEIN SURGERY         Time Tracking:     OT Date of Treatment: 07/25/24  OT Start Time: 1005  OT Stop Time: 1030  OT  Total Time (min): 25 min    Billable Minutes:Re-eval 15  Self Care/Home Management 10    7/25/2024  Jaylene Smith, OT

## 2024-07-25 NOTE — PLAN OF CARE
Pt received in CVRU, AAOx3; 12 Lead EKG performed without issue.  Gown and bedding changed; PurWick placed (by female RN) per pt request.   Back pain addressed; Norco 10 admin as directed in orders.  Pt and two friends at bedside given printed and verbal discharge instructions for care of Post Op PPM, including left arm restrictions and application and removal of Sling and Swath. Ice Pack to left upper chest stressed for continuous use for first 48 hours.   Transferred back to room 318, via hospital bed, in no apparent distress. Tele box placed and confirmed. Call light placed in bed with pt. Bedside table with personal belongings placed on right side of bed due inability to use LUE.   Report given to RENETTA Sifuentes; no further questions at this time.  Friends remain at bedside throughout transfer and in room.

## 2024-07-25 NOTE — SUBJECTIVE & OBJECTIVE
Interval History: CRT pacemaker placed. Post-op EKG complete. Denies dizziness or lightheadedness. Improved SOB and left groin pain. Afebrile. Reported left chest wall pain from procedure. Controlled with pain medication and ice packs. Continue PT/OT to treat.      Review of Systems   Constitutional:  Positive for fatigue (with exertion). Negative for activity change, appetite change, chills and fever.   HENT:  Negative for congestion, facial swelling, nosebleeds, rhinorrhea, sinus pressure, sinus pain, sneezing, sore throat and trouble swallowing.    Eyes:  Negative for photophobia and visual disturbance.   Respiratory:  Negative for cough, choking, chest tightness, shortness of breath and wheezing.    Cardiovascular:  Positive for chest pain (left chest wall pain post op). Negative for palpitations and leg swelling.   Gastrointestinal:  Negative for abdominal distention, abdominal pain, constipation, diarrhea, nausea and vomiting.   Endocrine: Negative for cold intolerance and heat intolerance.   Genitourinary:  Negative for difficulty urinating, dysuria, flank pain, frequency, hematuria and urgency.   Musculoskeletal:  Positive for arthralgias, back pain and myalgias. Negative for gait problem and joint swelling.   Skin:  Positive for color change (BLE discoloration from chronic venous stasis). Negative for pallor, rash and wound.   Allergic/Immunologic: Negative for environmental allergies, food allergies and immunocompromised state.   Neurological:  Positive for weakness (generalized). Negative for dizziness, seizures, syncope, speech difficulty, numbness and headaches.   Hematological:  Bruises/bleeds easily.   Psychiatric/Behavioral:  Negative for agitation and confusion. The patient is not nervous/anxious.      Objective:     Vital Signs (Most Recent):  Temp: 98.1 °F (36.7 °C) (07/25/24 0805)  Pulse: 89 (07/25/24 0830)  Resp: 18 (07/25/24 0805)  BP: (!) 138/58 (07/25/24 0805)  SpO2: (!) 91 % (07/25/24 0805)  Vital Signs (24h Range):  Temp:  [97.7 °F (36.5 °C)-98.8 °F (37.1 °C)] 98.1 °F (36.7 °C)  Pulse:  [41-89] 89  Resp:  [16-20] 18  SpO2:  [91 %-98 %] 91 %  BP: (111-195)/(54-78) 138/58     Weight: 67.3 kg (148 lb 5.9 oz)  Body mass index is 21.29 kg/m².    Intake/Output Summary (Last 24 hours) at 7/25/2024 0906  Last data filed at 7/25/2024 0747  Gross per 24 hour   Intake 100 ml   Output 1050 ml   Net -950 ml         Physical Exam  Constitutional:       General: She is not in acute distress.     Appearance: Normal appearance. She is not ill-appearing or toxic-appearing.   HENT:      Head: Normocephalic and atraumatic.      Right Ear: External ear normal.      Left Ear: External ear normal.      Nose: Nose normal. No congestion or rhinorrhea.      Mouth/Throat:      Mouth: Mucous membranes are moist.      Pharynx: Oropharynx is clear. No oropharyngeal exudate or posterior oropharyngeal erythema.   Eyes:      Extraocular Movements: Extraocular movements intact.      Conjunctiva/sclera: Conjunctivae normal.      Pupils: Pupils are equal, round, and reactive to light.   Neck:      Vascular: No carotid bruit.   Cardiovascular:      Rate and Rhythm: Normal rate. Rhythm irregular.      Pulses: Normal pulses.      Heart sounds: Murmur heard.      No friction rub. No gallop.   Pulmonary:      Effort: Pulmonary effort is normal. No respiratory distress.      Breath sounds: Normal breath sounds. No wheezing, rhonchi or rales.      Comments: Diminished bases bilaterally  Chest:      Chest wall: Tenderness (left - CRT pacemaker) present.   Abdominal:      General: Abdomen is flat. Bowel sounds are normal. There is no distension.      Palpations: Abdomen is soft.      Tenderness: There is no abdominal tenderness. There is no guarding or rebound.   Musculoskeletal:         General: No swelling. Normal range of motion.      Cervical back: Normal range of motion and neck supple. No tenderness.      Right lower leg: No edema.       Left lower leg: No edema.   Skin:     General: Skin is warm.      Capillary Refill: Capillary refill takes less than 2 seconds.      Coloration: Skin is not pale.      Findings: Bruising present. No erythema or rash.      Comments: Chronic venous stasis bilaterally  Incision to left chest wall - pacemaker   Neurological:      General: No focal deficit present.      Mental Status: She is alert and oriented to person, place, and time.      Sensory: No sensory deficit.      Motor: Weakness (generalized) present.      Coordination: Coordination normal.      Gait: Gait normal.   Psychiatric:         Mood and Affect: Mood normal.         Behavior: Behavior normal.         Thought Content: Thought content normal.         Judgment: Judgment normal.             Significant Labs: All pertinent labs within the past 24 hours have been reviewed.    Significant Imaging: I have reviewed all pertinent imaging results/findings within the past 24 hours.

## 2024-07-25 NOTE — SUBJECTIVE & OBJECTIVE
Review of Systems   Constitutional: Positive for malaise/fatigue.   HENT: Negative.     Eyes: Negative.    Cardiovascular:  Positive for chest pain (incisional) and dyspnea on exertion (improved).   Respiratory: Negative.     Endocrine: Negative.    Hematologic/Lymphatic: Negative.    Skin: Negative.    Musculoskeletal:  Positive for arthritis and joint pain.   Gastrointestinal: Negative.    Genitourinary: Negative.    Neurological: Negative.    Psychiatric/Behavioral: Negative.     Allergic/Immunologic: Negative.      Objective:     Vital Signs (Most Recent):  Temp: 98.1 °F (36.7 °C) (07/25/24 0805)  Pulse: 89 (07/25/24 0830)  Resp: 18 (07/25/24 0805)  BP: (!) 138/58 (07/25/24 0805)  SpO2: (!) 91 % (07/25/24 0805) Vital Signs (24h Range):  Temp:  [97.7 °F (36.5 °C)-98.8 °F (37.1 °C)] 98.1 °F (36.7 °C)  Pulse:  [41-89] 89  Resp:  [16-20] 18  SpO2:  [91 %-98 %] 91 %  BP: (111-195)/(54-78) 138/58     Weight: 67.3 kg (148 lb 5.9 oz)  Body mass index is 21.29 kg/m².     SpO2: (!) 91 %         Intake/Output Summary (Last 24 hours) at 7/25/2024 1027  Last data filed at 7/25/2024 0747  Gross per 24 hour   Intake 100 ml   Output 1050 ml   Net -950 ml       Lines/Drains/Airways       Peripheral Intravenous Line  Duration                  Peripheral IV - Single Lumen 07/24/24 0600 22 G Posterior;Right Hand 1 day         Peripheral IV - Single Lumen 07/24/24 1500 20 G Anterior;Distal;Left Upper Arm <1 day                       Physical Exam  Vitals and nursing note reviewed.   Constitutional:       General: She is not in acute distress.     Appearance: Normal appearance. She is well-developed. She is not diaphoretic.   HENT:      Head: Normocephalic and atraumatic.   Eyes:      General:         Right eye: No discharge.         Left eye: No discharge.      Pupils: Pupils are equal, round, and reactive to light.   Cardiovascular:      Rate and Rhythm: Normal rate and regular rhythm.      Heart sounds: Normal heart sounds, S1  "normal and S2 normal. No murmur heard.     Comments: CRT-P site with dressing intact, C/D, no erythema; sling in place LUE  Pulmonary:      Effort: Pulmonary effort is normal.   Abdominal:      General: There is no distension.   Musculoskeletal:      Right lower leg: No edema.      Left lower leg: No edema.   Skin:     General: Skin is warm and dry.      Findings: No erythema.   Neurological:      General: No focal deficit present.      Mental Status: She is alert and oriented to person, place, and time.   Psychiatric:         Mood and Affect: Mood normal.         Behavior: Behavior normal.            Significant Labs: CMP   Recent Labs   Lab 07/24/24 0425 07/25/24  0333     142 142   K 3.7  3.7 4.3     106 105   CO2 25  25 26   GLU 91  91 110   BUN 41*  41* 31*   CREATININE 1.1  1.1 1.0   CALCIUM 8.5*  8.5* 8.5*   PROT 5.0*  --    ALBUMIN 2.9*  --    BILITOT 0.7  --    ALKPHOS 68  --    AST 19  --    ALT 16  --    ANIONGAP 11  11 11   , CBC   Recent Labs   Lab 07/24/24 0425 07/25/24  0333   WBC 4.17 4.72   HGB 10.8* 10.5*   HCT 32.7* 32.6*    166   , Troponin No results for input(s): "TROPONINI" in the last 48 hours., and All pertinent lab results from the last 24 hours have been reviewed.    Significant Imaging: Echocardiogram: Transthoracic echo (TTE) complete (Cupid Only):   Results for orders placed or performed during the hospital encounter of 07/13/24   Echo   Result Value Ref Range    BSA 1.86 m2    LVIDd 4.20 3.5 - 6.0 cm    LV Systolic Volume 47.52 mL    LV Systolic Volume Index 25.4 mL/m2    LVIDs 3.40 2.1 - 4.0 cm    LV Diastolic Volume 78.48 mL    LV Diastolic Volume Index 41.97 mL/m2    Left Ventricular End Systolic Volume by Teichholz Method 47.52 mL    Left Ventricular End Diastolic Volume by Teichholz Method 78.48 mL    IVS 1.53 (A) 0.6 - 1.1 cm    LVOT diameter 2.07 cm    LVOT area 3.4 cm2    FS 19 (A) 28 - 44 %    Left Ventricle Relative Wall Thickness 0.70 cm    " Posterior Wall 1.47 (A) 0.6 - 1.1 cm    LV mass 249.50 g    LV Mass Index 133 g/m2    MV Peak E Bobby 1.00 m/s    TDI LATERAL 0.09 m/s    TDI SEPTAL 0.11 m/s    E/E' ratio 10.00 m/s    MV Peak A Bobby 0.83 m/s    TR Max Bobby 3.24 m/s    E/A ratio 1.20     IVRT 87.54 msec    E wave deceleration time 256.49 msec    LV SEPTAL E/E' RATIO 9.09 m/s    LV LATERAL E/E' RATIO 11.11 m/s    LA size 4.89 cm    Left Atrium Minor Axis 6.92 cm    Left Atrium Major Axis 7.90 cm    RA Major Axis 6.58 cm    AV regurgitation pressure 1/2 time 484.906873913539445 ms    AR Max Bobby 4.16 m/s    AV mean gradient 11 mmHg    AV peak gradient 20 mmHg    Ao peak bobby 2.25 m/s    Ao VTI 59.70 cm    MV stenosis pressure 1/2 time 74.38 ms    MV valve area p 1/2 method 2.96 cm2    Triscuspid Valve Regurgitation Peak Gradient 42 mmHg    PV PEAK VELOCITY 1.41 m/s    PV peak gradient 8 mmHg    Pulmonary Valve Mean Velocity 0.91 m/s    STJ 3.00 cm    IVC diameter 1.08 cm    Mean e' 0.10 m/s    ZLVIDS 0.44     ZLVIDD -2.16     EF 55 %    TV resting pulmonary artery pressure 45 mmHg    RV TB RVSP 6 mmHg    Est. RA pres 3 mmHg    Narrative      Left Ventricle: The left ventricle is normal in size. Normal wall   thickness. There is concentric hypertrophy. There is normal systolic   function. Ejection fraction by visual approximation is 55%.    Right Ventricle: Normal right ventricular cavity size. Wall thickness   is normal. Systolic function is normal.    Aortic Valve: There is mild aortic regurgitation with a centrally   directed jet.    Pulmonary Artery: The estimated pulmonary artery systolic pressure is   45 mmHg.    IVC/SVC: Normal venous pressure at 3 mmHg.      and EKG: Reviewed

## 2024-07-25 NOTE — ASSESSMENT & PLAN NOTE
-Titrate medications, cont ARB    07/20/2024  Continue hydralazine and ARB  Avoid avn blocker due to ben and AV dissociation    7/25/24  -Meds adjusted today, monitor trend

## 2024-07-25 NOTE — PLAN OF CARE
OT re-eval completed. OT POC updated.  Recommends mod intensity therapy.  CGA for bed mobility. Mod A for STS with QC. Min A for step t/f with QC.

## 2024-07-25 NOTE — PT/OT/SLP RE-EVAL
"Physical Therapy Re-evaluation    Patient Name:  Kaleigh Delgado   MRN:  [id number]    Recommendations:     Discharge Recommendations: Moderate Intensity Therapy  Discharge Equipment Recommendations: to be determined by next level of care   Barriers to discharge: Decreased caregiver support    Assessment:     Kaleigh Delgado is a 74 y.o. female admitted with a medical diagnosis of CHF (congestive heart failure).  She presents with the following impairments/functional limitations: weakness, impaired endurance, impaired functional mobility, gait instability, impaired balance, decreased safety awareness, decreased lower extremity function, decreased ROM, impaired cardiopulmonary response to activity, pain.    Rehab Prognosis:  Good; patient would benefit from acute skilled PT services to address these deficits and reach maximum level of function.      Recent Surgery: Procedure(s) (LRB):  INSERTION, PACEMAKER, BIVENTRICULAR (Left) 1 Day Post-Op    Plan:     During this hospitalization, patient to be seen 3 x/week to address the above listed problems via gait training, therapeutic activities, therapeutic exercises  Plan of Care Expires:  08/08/24  Plan of Care Reviewed with: patient    Subjective     Communicated with nurse and epic chart review prior to session.  Patient found HOB elevated with SCD, pressure relief boots, PureWick, peripheral IV, telemetry upon PT entry to room, agreeable to evaluation.      Chief Complaint: Pt is motivated to participate  Patient comments/goals: none stated  Pain/Comfort:  Pain Rating 1: 1/10 ("sore")  Location - Side 1: Left  Location - Orientation 1: generalized  Location 1: arm  Pain Addressed 1: Distraction, Pre-medicate for activity  Pain Rating Post-Intervention 1: 1/10    Patients cultural, spiritual, Taoist conflicts given the current situation: no      Objective:     Patient found with: SCD, pressure relief boots, PureWick, peripheral IV, telemetry     General Precautions: " "Standard, fall, pacemaker  Orthopedic Precautions: LUE non weight bearing  Braces: Sling and swathe  Respiratory Status: Room air    Exams:  Cognitive Exam:  Patient is oriented to Person, Place, Time, and Situation  Sensation:    -       Intact  Skin Integrity/Edema:      -       Skin integrity: Visible skin intact  RLE ROM: WFL  RLE Strength: WFL  LLE ROM: WFL  LLE Strength: WFL    Functional Mobility:  Gait belt applied - Yes  Bed Mobility  Rolling Right: contact guard assistance  Scooting: maximal assistance  Supine to Sit: contact guard assistance  Transfers  Sit to Stand: moderate assistance with quad cane  Bed to Chair: minimum assistance with quad cane using Step Transfer  Gait  Patient ambulated 4 steps to transfer to chair with quad cane and minimum assistance. Patient demonstrates occasional unsteady gait. No c/o dizziness or SOB. Pt with kyphotic posture, cuing to upright posture. All lines remained intact throughout ambulation trail.  Balance  Sitting: contact guard assistance  Standing: minimum assistance    AM-PAC 6 CLICK MOBILITY  Total Score:15       Treatment and Education:  Reviewed role of PT in acute care and POC. Pt tolerated interventions well. Reviewed importance of OOB activities, activity pacing, and HEP (marching/hip flex, hip abd, heel slides/LAQ, quad sets, ankle pumps) in order to maintain/regain strength. Encouraged to sit up in chair for all meals. Reviewed proper use of QC for safety and to reduce risk of falling. Reviewed "call don't fall" policy and increased risk of falling due to weakness, instructed to utilize call bell for assistance with all transfers. Pt agreeable to all requests.    Patient left up in chair with all lines intact, call button in reach, and chair alarm on.    GOALS:   Multidisciplinary Problems       Physical Therapy Goals          Problem: Physical Therapy    Goal Priority Disciplines Outcome Goal Variances Interventions   Physical Therapy Goal     PT, PT/OT " Progressing     Description: Goals to be met by 8/8/24.  1. Pt will complete bed mobility SBA.  2. Pt will complete sit to stand SBA.  3. Pt will ambulate 200ft SBA using LRAD.  4. Pt will increase AMPAC score by 2 points to progress functional mobility.                       History:     History reviewed. No pertinent past medical history.    Past Surgical History:   Procedure Laterality Date    exploratory lap      FIRST RIB REMOVAL      KNEE SURGERY      TONSILLECTOMY      VARICOSE VEIN SURGERY         Time Tracking:     PT Received On: 07/25/24  PT Start Time: 1000     PT Stop Time: 1025  PT Total Time (min): 25 min     Billable Minutes: Re-eval 10min and Therapeutic Activity 15min    07/25/2024

## 2024-07-25 NOTE — NURSING
Post Procedure Rounding    Left chest incision site looks good. No evidence of infection. Patient states that she was satisfied with her care in the cath lab.

## 2024-07-25 NOTE — PLAN OF CARE
Problem: Adult Inpatient Plan of Care  Goal: Plan of Care Review  Outcome: Progressing  Goal: Patient-Specific Goal (Individualized)  Outcome: Progressing  Goal: Absence of Hospital-Acquired Illness or Injury  Outcome: Progressing  Goal: Optimal Comfort and Wellbeing  Outcome: Progressing  Goal: Readiness for Transition of Care  Outcome: Progressing     Problem: Skin Injury Risk Increased  Goal: Skin Health and Integrity  Outcome: Progressing     Problem: Fatigue  Goal: Improved Activity Tolerance  Outcome: Progressing

## 2024-07-25 NOTE — PT/OT/SLP PROGRESS
Physical Therapy Treatment    Patient Name:  Kaleihg Delgado   MRN:  [id number]    Recommendations:     Discharge Recommendations: Moderate Intensity Therapy  Discharge Equipment Recommendations: to be determined by next level of care  Barriers to discharge: Decreased caregiver support    Assessment:     Kaleigh Delgado is a 74 y.o. female admitted with a medical diagnosis of CHF (congestive heart failure).  She presents with the following impairments/functional limitations: weakness, impaired endurance, impaired functional mobility, gait instability, impaired balance, pain, decreased safety awareness, decreased lower extremity function, impaired cardiopulmonary response to activity, decreased ROM.    Rehab Prognosis: Good; patient would benefit from acute skilled PT services to address these deficits and reach maximum level of function.    Recent Surgery: Procedure(s) (LRB):  INSERTION, PACEMAKER, BIVENTRICULAR (Left) 1 Day Post-Op    Plan:     During this hospitalization, patient to be seen 3 x/week to address the identified rehab impairments via gait training, therapeutic activities, therapeutic exercises and progress toward the following goals:    Plan of Care Expires:  08/08/24    Subjective     Chief Complaint: Staff requested assistance to transfer pt back to bed  Patient/Family Comments/goals: none stated  Pain/Comfort:  Pain Rating 1: 1/10  Location - Side 1: Left  Location - Orientation 1: generalized  Location 1: arm  Pain Addressed 1: Distraction, Pre-medicate for activity  Pain Rating Post-Intervention 1: 1/10      Objective:     Communicated with nurse and epic chart review prior to session.  Patient found up in chair with chair check, peripheral IV, telemetry, PureWick upon PT entry to room.     General Precautions: Standard, fall, pacemaker  Orthopedic Precautions: LUE non weight bearing  Braces: Sling and swathe  Respiratory Status: Room air     Functional Mobility:  Gait belt applied - Yes  Bed  "Mobility  Scooting: minimum assistance  Sit to Supine: minimum assistance for LE management  Transfers  Sit to Stand: maximal assistance with quad cane  Chair to Bed: minimum assistance with quad cane using Step Transfer  Gait  Patient ambulated 4 steps to transfer with quad cane and minimum assistance. Patient demonstrates occasional unsteady gait. No c/o dizziness or SOB. All lines remained intact throughout ambulation trail.  Balance  Sitting: contact guard assistance  Standing: minimum assistance      AM-PAC 6 CLICK MOBILITY  Turning over in bed (including adjusting bedclothes, sheets and blankets)?: 3  Sitting down on and standing up from a chair with arms (e.g., wheelchair, bedside commode, etc.): 2  Moving from lying on back to sitting on the side of the bed?: 3  Moving to and from a bed to a chair (including a wheelchair)?: 3  Need to walk in hospital room?: 3  Climbing 3-5 steps with a railing?: 1 (NT)  Basic Mobility Total Score: 15       Treatment & Education:  Reviewed role of PT in acute care and POC. Reviewed importance of OOB activities, activity pacing, and HEP (marching/hip flex, hip abd, heel slides/LAQ, quad sets, ankle pumps) in order to maintain/regain strength. Encouraged to sit up in chair for all meals. Reviewed proper use of RW for safety and to reduce risk of falling. Reviewed "call don't fall" policy and increased risk of falling due to weakness, instructed to utilize call bell for assistance with all transfers. Pt agreeable to all requests.      Patient left HOB elevated with all lines intact, call button in reach, and bed alarm on..    GOALS:   Multidisciplinary Problems       Physical Therapy Goals          Problem: Physical Therapy    Goal Priority Disciplines Outcome Goal Variances Interventions   Physical Therapy Goal     PT, PT/OT Progressing     Description: Goals to be met by 8/8/24.  1. Pt will complete bed mobility SBA.  2. Pt will complete sit to stand SBA.  3. Pt will ambulate " 200ft SBA using LRAD.  4. Pt will increase AMPAC score by 2 points to progress functional mobility.                       Time Tracking:     PT Received On: 07/25/24  PT Start Time: 1437     PT Stop Time: 1445  PT Total Time (min): 8 min     Billable Minutes: Therapeutic Activity 8min    Treatment Type: Treatment  PT/PTA: PT     Number of PTA visits since last PT visit: 0     07/25/2024

## 2024-07-26 VITALS
WEIGHT: 148.38 LBS | HEART RATE: 74 BPM | DIASTOLIC BLOOD PRESSURE: 56 MMHG | TEMPERATURE: 98 F | SYSTOLIC BLOOD PRESSURE: 120 MMHG | HEIGHT: 70 IN | RESPIRATION RATE: 18 BRPM | OXYGEN SATURATION: 96 % | BODY MASS INDEX: 21.24 KG/M2

## 2024-07-26 LAB
ANION GAP SERPL CALC-SCNC: 8 MMOL/L (ref 8–16)
BUN SERPL-MCNC: 41 MG/DL (ref 8–23)
CALCIUM SERPL-MCNC: 8.4 MG/DL (ref 8.7–10.5)
CHLORIDE SERPL-SCNC: 104 MMOL/L (ref 95–110)
CO2 SERPL-SCNC: 26 MMOL/L (ref 23–29)
CREAT SERPL-MCNC: 1.2 MG/DL (ref 0.5–1.4)
EST. GFR  (NO RACE VARIABLE): 48 ML/MIN/1.73 M^2
GLUCOSE SERPL-MCNC: 95 MG/DL (ref 70–110)
OHS QRS DURATION: 142 MS
OHS QTC CALCULATION: 539 MS
POTASSIUM SERPL-SCNC: 4 MMOL/L (ref 3.5–5.1)
SODIUM SERPL-SCNC: 138 MMOL/L (ref 136–145)

## 2024-07-26 PROCEDURE — 80048 BASIC METABOLIC PNL TOTAL CA: CPT | Performed by: INTERNAL MEDICINE

## 2024-07-26 PROCEDURE — 25000003 PHARM REV CODE 250: Performed by: INTERNAL MEDICINE

## 2024-07-26 PROCEDURE — 36415 COLL VENOUS BLD VENIPUNCTURE: CPT | Performed by: INTERNAL MEDICINE

## 2024-07-26 PROCEDURE — 97116 GAIT TRAINING THERAPY: CPT | Mod: CQ

## 2024-07-26 PROCEDURE — 97530 THERAPEUTIC ACTIVITIES: CPT

## 2024-07-26 PROCEDURE — 25000003 PHARM REV CODE 250: Performed by: PHYSICIAN ASSISTANT

## 2024-07-26 PROCEDURE — 97530 THERAPEUTIC ACTIVITIES: CPT | Mod: CQ

## 2024-07-26 PROCEDURE — 25000003 PHARM REV CODE 250: Performed by: STUDENT IN AN ORGANIZED HEALTH CARE EDUCATION/TRAINING PROGRAM

## 2024-07-26 RX ORDER — LOSARTAN POTASSIUM 50 MG/1
50 TABLET ORAL DAILY
Qty: 30 TABLET | Refills: 0 | Status: SHIPPED | OUTPATIENT
Start: 2024-07-26 | End: 2024-08-25

## 2024-07-26 RX ORDER — HYDROCODONE BITARTRATE AND ACETAMINOPHEN 10; 325 MG/1; MG/1
1 TABLET ORAL EVERY 6 HOURS PRN
Qty: 12 TABLET | Refills: 0 | Status: SHIPPED | OUTPATIENT
Start: 2024-07-26 | End: 2024-07-29

## 2024-07-26 RX ORDER — METOPROLOL SUCCINATE 50 MG/1
50 TABLET, EXTENDED RELEASE ORAL DAILY
Qty: 30 TABLET | Refills: 0 | Status: SHIPPED | OUTPATIENT
Start: 2024-07-27 | End: 2024-08-26

## 2024-07-26 RX ORDER — HYDRALAZINE HYDROCHLORIDE 10 MG/1
10 TABLET, FILM COATED ORAL EVERY 8 HOURS
Status: DISCONTINUED | OUTPATIENT
Start: 2024-07-26 | End: 2024-07-26 | Stop reason: HOSPADM

## 2024-07-26 RX ORDER — FUROSEMIDE 40 MG/1
40 TABLET ORAL DAILY
Qty: 30 TABLET | Refills: 0 | Status: SHIPPED | OUTPATIENT
Start: 2024-07-27 | End: 2024-08-26

## 2024-07-26 RX ORDER — HYDRALAZINE HYDROCHLORIDE 10 MG/1
10 TABLET, FILM COATED ORAL EVERY 8 HOURS
Qty: 90 TABLET | Refills: 0 | Status: SHIPPED | OUTPATIENT
Start: 2024-07-26 | End: 2024-08-25

## 2024-07-26 RX ADMIN — HYDROCODONE BITARTRATE AND ACETAMINOPHEN 1 TABLET: 5; 325 TABLET ORAL at 05:07

## 2024-07-26 RX ADMIN — SENNOSIDES AND DOCUSATE SODIUM 1 TABLET: 50; 8.6 TABLET ORAL at 08:07

## 2024-07-26 RX ADMIN — METOPROLOL SUCCINATE 50 MG: 50 TABLET, EXTENDED RELEASE ORAL at 08:07

## 2024-07-26 RX ADMIN — HYDRALAZINE HYDROCHLORIDE 10 MG: 10 TABLET, FILM COATED ORAL at 02:07

## 2024-07-26 RX ADMIN — POLYETHYLENE GLYCOL 3350 17 G: 17 POWDER, FOR SOLUTION ORAL at 08:07

## 2024-07-26 RX ADMIN — FUROSEMIDE 40 MG: 40 TABLET ORAL at 08:07

## 2024-07-26 RX ADMIN — HYDRALAZINE HYDROCHLORIDE 25 MG: 25 TABLET ORAL at 05:07

## 2024-07-26 NOTE — PLAN OF CARE
07/26/24 1039   Post-Acute Status   Post-Acute Authorization Placement   Post-Acute Placement Status Pending medical clearance/testing   Discharge Delays None known at this time   Discharge Plan   Discharge Plan A Skilled Nursing Facility   Discharge Plan B Skilled Nursing Facility     Auth received for [facility]. Pt notified at bedside.   DC pending cards clearances; possible DC today  SW to follow

## 2024-07-26 NOTE — PLAN OF CARE
DC clearance received. DC clinicals provided to [facility]. SW to f/u with bedside nurse to coordinate DC to SNF.

## 2024-07-26 NOTE — PLAN OF CARE
Bed mobility min A. Seated scoot min A with increased time. Sit to stand with WBQC min A. Gait trained x20 feet min A with WBQC. Transfer to bedside chair min A.     Rec moderate intensity therapy at [facility]

## 2024-07-26 NOTE — ASSESSMENT & PLAN NOTE
Improving since admission  BNP pending  Wean O2 as tolerated    7/16/24  BNP still elevated  Diurese    7/18/24  -Improving, continue IV Lasix    7/25/24  -Lasix switched to po    7/26/24  Improved  Cont PO lasix

## 2024-07-26 NOTE — ASSESSMENT & PLAN NOTE
-Titrate medications, cont ARB    07/20/2024  Continue hydralazine and ARB  Avoid avn blocker due to ben and AV dissociation    7/25/24  -Meds adjusted today, monitor trend    7/26/24  stable

## 2024-07-26 NOTE — PLAN OF CARE
Report given to nurse Rudd at [facility]. Patient discharged with personal belongings. Discharge education and medications reviewed with patient. LDA removed per MD order.    Problem: Adult Inpatient Plan of Care  Goal: Plan of Care Review  Outcome: Met  Goal: Patient-Specific Goal (Individualized)  Outcome: Met  Goal: Absence of Hospital-Acquired Illness or Injury  Outcome: Met  Goal: Optimal Comfort and Wellbeing  Outcome: Met  Goal: Readiness for Transition of Care  Outcome: Met     Problem: Skin Injury Risk Increased  Goal: Skin Health and Integrity  Outcome: Met     Problem: Fatigue  Goal: Improved Activity Tolerance  Outcome: Met     Problem: Heart Failure  Goal: Optimal Coping  Outcome: Met  Goal: Optimal Cardiac Output  Outcome: Met  Goal: Stable Heart Rate and Rhythm  Outcome: Met  Goal: Optimal Functional Ability  Outcome: Met  Goal: Fluid and Electrolyte Balance  Outcome: Met  Goal: Improved Oral Intake  Outcome: Met  Goal: Effective Oxygenation and Ventilation  Outcome: Met  Goal: Effective Breathing Pattern During Sleep  Outcome: Met     Problem: Fall Injury Risk  Goal: Absence of Fall and Fall-Related Injury  Outcome: Met

## 2024-07-26 NOTE — ASSESSMENT & PLAN NOTE
BNP 2367, received IV lasix x4. On nasal cannula  GDMT ARB, not on BB given bradycardia  Kidney function stable  Repeat BNP pending  Echo yesterday EF 55%  Nuc stress tomorrow  F/u in [facility]    7/16/24  Hold off on nuc stress today, diurese  BNP elevated  IV diuresis cont ARB  Strict I/Os monitor renal function    7/17/24  Cont IV diuresis, ARB    7/18/24  -Improving, BNP trending down  -Continue IV diuresis for additional day  -Continue ARB    07/20/2024  Continue lasix 40 mg ivp bid    07/21/2024  Continue lasix     7/22/2024  -Avoid AV giacomo agents given bradycardia  -Continue IV lasix 40 mg BID, Losartan  -Dash diet 2 gm sodium restriction  -1500 ml fluid restriction  -Update BNP in AM    7/23/24  -BNP trending down, CV wise remains stable  -Continue IV Lasix, ARB, hydralazine  -CRT-P planned tmw AM, keep NPO after MN    7/24/24  -Stable, continue ARB, hydralazine, IV Lasix  -CRT-P today by Dr. Rossi    7/25/24  -Stable, s/p CRT-P placement by Dr. Castro  -Continue ARB, hydralazine  -Lasix switched to po  -BB added    7/26/24  Cont ARB, PO lasix and BB  F/u in [facility]

## 2024-07-26 NOTE — PT/OT/SLP PROGRESS
Occupational Therapy   Treatment    Name: Kaleigh Delgado  MRN: [id number]  Admitting Diagnosis:  CHF (congestive heart failure)  2 Days Post-Op    Recommendations:     Discharge Recommendations: Moderate Intensity Therapy  Discharge Equipment Recommendations:  to be determined by next level of care  Barriers to discharge:  Decreased caregiver support    Assessment:     Kaleigh Delgado is a 74 y.o. female with a medical diagnosis of CHF (congestive heart failure). Performance deficits affecting function are weakness, gait instability, decreased upper extremity function, decreased ROM, impaired coordination, decreased lower extremity function, impaired balance, impaired endurance, decreased safety awareness, pain, impaired functional mobility, impaired self care skills, decreased coordination, orthopedic precautions.     Rehab Prognosis:  Good; patient would benefit from acute skilled OT services to address these deficits and reach maximum level of function.       Plan:     Patient to be seen 2 x/week to address the above listed problems via self-care/home management, therapeutic exercises, therapeutic activities  Plan of Care Expires: 08/08/24  Plan of Care Reviewed with: patient    Subjective     Chief Complaint: Weakness  Patient/Family Comments/goals: increase independence  Pain/Comfort:  Pain Rating 1: 0/10  Pain Rating Post-Intervention 1: 0/10    Objective:     Communicated with: Nurse prior to session.  Patient found supine with telemetry, peripheral IV, PureWick upon OT entry to room.    General Precautions: Standard, fall, pacemaker    Orthopedic Precautions:LUE non weight bearing  Braces: Sling and swathe  Respiratory Status: Room air     Occupational Performance:     Bed Mobility:    Patient completed Rolling/Turning to Left with  minimum assistance  Patient completed Scooting/Bridging with minimum assistance  Patient completed Supine to Sit with minimum assistance     Functional Mobility/Transfers:  Patient  completed Sit <> Stand Transfer with minimum assistance  with  quad cane   Patient completed Bed <> Chair Transfer using Stand Pivot technique with minimum assistance with quad cane  Functional Mobility: Gait trained with WBQC x20 feet min A with slow pace, cues for sequencing    AMPAC 6 Click ADL: 12    Treatment & Education:  Pt requires increased time for all mobility. Pt transferred to bedside chair following gait training min A. Pt encouraged to continue with OOB activity to increase CV endurance needed for daily tasks. Pt educated on importance of L hand composite /extension exercises to increase joint mobility to NWB UE. Pt verbalized understanding. Call don't fall education provided.     Patient left up in chair with all lines intact, call button in reach, and chair alarm on    GOALS:   Multidisciplinary Problems       Occupational Therapy Goals          Problem: Occupational Therapy    Goal Priority Disciplines Outcome Interventions   Occupational Therapy Goal     OT, PT/OT Progressing    Description: Goals to be met by: 8/8/24     Patient will increase functional independence with ADLs by performing:    UE Dressing with Minimal Assistance.  Grooming while standing at sink with Contact Guard Assistance.  Toileting from toilet with Standby Assistance for hygiene and clothing management.   Toilet transfer to toilet with Contact Guard Assistance with QC.  Upper extremity exercise program x15 reps per handout, with independence.                         Time Tracking:     OT Date of Treatment: 07/26/24  OT Start Time: 0840  OT Stop Time: 0905  OT Total Time (min): 25 min    Billable Minutes:Therapeutic Activity 25    RADHA Nicholson  OT/MEGHAN: OT          7/26/2024

## 2024-07-26 NOTE — SUBJECTIVE & OBJECTIVE
Review of Systems   Constitutional: Positive for malaise/fatigue.   HENT: Negative.     Eyes: Negative.    Cardiovascular:  Positive for chest pain (incisional) and dyspnea on exertion (improved).   Respiratory: Negative.     Endocrine: Negative.    Hematologic/Lymphatic: Negative.    Skin: Negative.    Musculoskeletal:  Positive for arthritis and joint pain.   Gastrointestinal: Negative.    Genitourinary: Negative.    Neurological: Negative.    Psychiatric/Behavioral: Negative.     Allergic/Immunologic: Negative.      Objective:     Vital Signs (Most Recent):  Temp: 98.2 °F (36.8 °C) (07/26/24 1156)  Pulse: 80 (07/26/24 1156)  Resp: 14 (07/26/24 1156)  BP: (!) 119/51 (07/26/24 1156)  SpO2: (!) 92 % (07/26/24 1156) Vital Signs (24h Range):  Temp:  [97.8 °F (36.6 °C)-98.4 °F (36.9 °C)] 98.2 °F (36.8 °C)  Pulse:  [65-86] 80  Resp:  [14-18] 14  SpO2:  [91 %-94 %] 92 %  BP: ()/(44-59) 119/51     Weight: 67.3 kg (148 lb 5.9 oz)  Body mass index is 21.29 kg/m².     SpO2: (!) 92 %         Intake/Output Summary (Last 24 hours) at 7/26/2024 1500  Last data filed at 7/26/2024 0830  Gross per 24 hour   Intake 480 ml   Output 1500 ml   Net -1020 ml       Lines/Drains/Airways       None                      Physical Exam  Vitals and nursing note reviewed.   Constitutional:       General: She is not in acute distress.     Appearance: Normal appearance. She is well-developed. She is not diaphoretic.   HENT:      Head: Normocephalic and atraumatic.   Eyes:      General:         Right eye: No discharge.         Left eye: No discharge.      Pupils: Pupils are equal, round, and reactive to light.   Cardiovascular:      Rate and Rhythm: Normal rate and regular rhythm.      Heart sounds: Normal heart sounds, S1 normal and S2 normal. No murmur heard.     Comments: CRT-P site with dressing intact, C/D, no erythema; sling in place LUE  Pulmonary:      Effort: Pulmonary effort is normal.   Abdominal:      General: There is no  "distension.   Musculoskeletal:      Right lower leg: No edema.      Left lower leg: No edema.   Skin:     General: Skin is warm and dry.      Findings: No erythema.   Neurological:      General: No focal deficit present.      Mental Status: She is alert and oriented to person, place, and time.   Psychiatric:         Mood and Affect: Mood normal.         Behavior: Behavior normal.            Significant Labs: CMP   Recent Labs   Lab 07/25/24  0333 07/26/24  0317    138   K 4.3 4.0    104   CO2 26 26    95   BUN 31* 41*   CREATININE 1.0 1.2   CALCIUM 8.5* 8.4*   ANIONGAP 11 8   , CBC   Recent Labs   Lab 07/25/24  0333   WBC 4.72   HGB 10.5*   HCT 32.6*      , Troponin No results for input(s): "TROPONINI" in the last 48 hours., and All pertinent lab results from the last 24 hours have been reviewed.    Significant Imaging: Echocardiogram: Transthoracic echo (TTE) complete (Cupid Only):   Results for orders placed or performed during the hospital encounter of 07/13/24   Echo   Result Value Ref Range    BSA 1.86 m2    LVIDd 4.20 3.5 - 6.0 cm    LV Systolic Volume 47.52 mL    LV Systolic Volume Index 25.4 mL/m2    LVIDs 3.40 2.1 - 4.0 cm    LV Diastolic Volume 78.48 mL    LV Diastolic Volume Index 41.97 mL/m2    Left Ventricular End Systolic Volume by Teichholz Method 47.52 mL    Left Ventricular End Diastolic Volume by Teichholz Method 78.48 mL    IVS 1.53 (A) 0.6 - 1.1 cm    LVOT diameter 2.07 cm    LVOT area 3.4 cm2    FS 19 (A) 28 - 44 %    Left Ventricle Relative Wall Thickness 0.70 cm    Posterior Wall 1.47 (A) 0.6 - 1.1 cm    LV mass 249.50 g    LV Mass Index 133 g/m2    MV Peak E Bobby 1.00 m/s    TDI LATERAL 0.09 m/s    TDI SEPTAL 0.11 m/s    E/E' ratio 10.00 m/s    MV Peak A Bobby 0.83 m/s    TR Max Bobby 3.24 m/s    E/A ratio 1.20     IVRT 87.54 msec    E wave deceleration time 256.49 msec    LV SEPTAL E/E' RATIO 9.09 m/s    LV LATERAL E/E' RATIO 11.11 m/s    LA size 4.89 cm    Left Atrium " Minor Axis 6.92 cm    Left Atrium Major Axis 7.90 cm    RA Major Axis 6.58 cm    AV regurgitation pressure 1/2 time 484.013289727798086 ms    AR Max Bobby 4.16 m/s    AV mean gradient 11 mmHg    AV peak gradient 20 mmHg    Ao peak bobby 2.25 m/s    Ao VTI 59.70 cm    MV stenosis pressure 1/2 time 74.38 ms    MV valve area p 1/2 method 2.96 cm2    Triscuspid Valve Regurgitation Peak Gradient 42 mmHg    PV PEAK VELOCITY 1.41 m/s    PV peak gradient 8 mmHg    Pulmonary Valve Mean Velocity 0.91 m/s    STJ 3.00 cm    IVC diameter 1.08 cm    Mean e' 0.10 m/s    ZLVIDS 0.44     ZLVIDD -2.16     EF 55 %    TV resting pulmonary artery pressure 45 mmHg    RV TB RVSP 6 mmHg    Est. RA pres 3 mmHg    Narrative      Left Ventricle: The left ventricle is normal in size. Normal wall   thickness. There is concentric hypertrophy. There is normal systolic   function. Ejection fraction by visual approximation is 55%.    Right Ventricle: Normal right ventricular cavity size. Wall thickness   is normal. Systolic function is normal.    Aortic Valve: There is mild aortic regurgitation with a centrally   directed jet.    Pulmonary Artery: The estimated pulmonary artery systolic pressure is   45 mmHg.    IVC/SVC: Normal venous pressure at 3 mmHg.      and EKG: Reviewed

## 2024-07-26 NOTE — PROGRESS NOTES
[facility]  Cardiology  Progress Note    Patient Name: Kaleigh Delgado  MRN: [id number]  Admission Date: 7/13/2024  Hospital Length of Stay: 13 days  Code Status: Full Code   Attending Physician: Zaina Lombardi,*   Primary Care Physician: Aldair Kaur MD  Expected Discharge Date: 7/26/2024  Principal Problem:CHF (congestive heart failure)    Subjective:     Hospital Course:   Patient is a 74 year old female with a past medical history of hypertension, history of CHF, followed in the past by [facility], apparent history of afib, but now in sinus rhythm. BNP greater than 2000. Chest x-ray shows CHF. EKG shows sinus bradycardia with PAC's. There is a mild increase in troponin 0.09. Patient states history of heart cath by [facility] in 2021 that was treated medically.    Recommend continue Iv diuresis, check echocardiogram, and blood pressure control. Patient will eventually need nuclear stress test. We will need to obtain cath report from [facility].        7/15/24 pt seen and examined today, resting in bed. On supplemental O2 via NC. Denies any CP at this time. C/o aches and fatigue, tele reviewed HR 40s on monitor. Labs reviewed, -2.3L for admission, troponin 0.309, Crt 1.0. Received 4 doses of lasix since admission not on any scheduled currently. Previously followed by [facility] had LHC done in 21', she is unsure of results. will get records. Nuc stress planned for tomorrow    7/16/24 Pt seen and examined today, does not feel well. Has lots of outside stressors,  is sick. Planned on nuc stress today EKG with intermittent 2:1 AVB, pt feeling anxious will reschedule once diuresed. BNP yesterday 2727, needs diuresis.     7/17/24 pt seen and examined today, doing better off oxygen during exam sitting up in chair. Diuresing well. Labs reviewed, chart reviewed. No acute CV events reported. Tele with occ 2:1AVB will get EP consult    7/18/24-Patient seen and examined today, sitting up in bedside chair. Feeling better, less  SOB. Continues to diurese well, BNP trending down. No CP. HR stable during exam. EP on board, CRT-P planned once euvolemic. Creatinine stable.     7/19/24 pt seen and examined today sitting up in bedside chair feeling better. -4L for admission. SB on tele, CRT-P planned with EP once diuresed. Crt 1.0 today    07/20/2024  Improved sob. Remain ben and AV dissociation, no dizziness faint and PND. Cr 1.0 stable I&O -4.4 liter since admission    07/21/2024  On tele now 2:1 AV conduction, Cr stable, good UOP. SOB improved. No dizziness faint    7/22/2024  -Patient seen and examined in room, reports improvement in shortness of breath symptoms since IV diuresis. Labs reviewed, K+ 4.3, Cr 1.2, Na 140. Plan for tentative CRT P on Wednesday.     7/23/24-Patient seen and examined today. No AEON. SOB improved. Labs reviewed. Creatinine stable. BNP trending down. CRT-P planned tmw, keep NPO after MN.    7/24/24-Patient seen and examined today, resting in bed. Feels well overall. CV wise, remains stable. Labs reviewed. CRT-P planned today.    7/25/24-Patient seen and examined today, sitting up in bed, s/p CRT-P yesterday. Feels ok. Admits to some site soreness and right hip pain. SOB improved/stable. Labs reviewed. Meds adjusted.    7/26/24 pt seen and examined today, sitting up in bedside chair. Feels ok, denies any CP or SOB at this time. Awaiting placement. Labs reviewed, chart reviewed. No acute CV events reported        Review of Systems   Constitutional: Positive for malaise/fatigue.   HENT: Negative.     Eyes: Negative.    Cardiovascular:  Positive for chest pain (incisional) and dyspnea on exertion (improved).   Respiratory: Negative.     Endocrine: Negative.    Hematologic/Lymphatic: Negative.    Skin: Negative.    Musculoskeletal:  Positive for arthritis and joint pain.   Gastrointestinal: Negative.    Genitourinary: Negative.    Neurological: Negative.    Psychiatric/Behavioral: Negative.     Allergic/Immunologic:  Negative.      Objective:     Vital Signs (Most Recent):  Temp: 98.2 °F (36.8 °C) (07/26/24 1156)  Pulse: 80 (07/26/24 1156)  Resp: 14 (07/26/24 1156)  BP: (!) 119/51 (07/26/24 1156)  SpO2: (!) 92 % (07/26/24 1156) Vital Signs (24h Range):  Temp:  [97.8 °F (36.6 °C)-98.4 °F (36.9 °C)] 98.2 °F (36.8 °C)  Pulse:  [65-86] 80  Resp:  [14-18] 14  SpO2:  [91 %-94 %] 92 %  BP: ()/(44-59) 119/51     Weight: 67.3 kg (148 lb 5.9 oz)  Body mass index is 21.29 kg/m².     SpO2: (!) 92 %         Intake/Output Summary (Last 24 hours) at 7/26/2024 1500  Last data filed at 7/26/2024 0830  Gross per 24 hour   Intake 480 ml   Output 1500 ml   Net -1020 ml       Lines/Drains/Airways       None                      Physical Exam  Vitals and nursing note reviewed.   Constitutional:       General: She is not in acute distress.     Appearance: Normal appearance. She is well-developed. She is not diaphoretic.   HENT:      Head: Normocephalic and atraumatic.   Eyes:      General:         Right eye: No discharge.         Left eye: No discharge.      Pupils: Pupils are equal, round, and reactive to light.   Cardiovascular:      Rate and Rhythm: Normal rate and regular rhythm.      Heart sounds: Normal heart sounds, S1 normal and S2 normal. No murmur heard.     Comments: CRT-P site with dressing intact, C/D, no erythema; sling in place LUE  Pulmonary:      Effort: Pulmonary effort is normal.   Abdominal:      General: There is no distension.   Musculoskeletal:      Right lower leg: No edema.      Left lower leg: No edema.   Skin:     General: Skin is warm and dry.      Findings: No erythema.   Neurological:      General: No focal deficit present.      Mental Status: She is alert and oriented to person, place, and time.   Psychiatric:         Mood and Affect: Mood normal.         Behavior: Behavior normal.            Significant Labs: CMP   Recent Labs   Lab 07/25/24  0333 07/26/24  0317    138   K 4.3 4.0    104   CO2 26 26  "   95   BUN 31* 41*   CREATININE 1.0 1.2   CALCIUM 8.5* 8.4*   ANIONGAP 11 8   , CBC   Recent Labs   Lab 07/25/24  0333   WBC 4.72   HGB 10.5*   HCT 32.6*      , Troponin No results for input(s): "TROPONINI" in the last 48 hours., and All pertinent lab results from the last 24 hours have been reviewed.    Significant Imaging: Echocardiogram: Transthoracic echo (TTE) complete (C[facility] Only):   Results for orders placed or performed during the hospital encounter of 07/13/24   Echo   Result Value Ref Range    BSA 1.86 m2    LVIDd 4.20 3.5 - 6.0 cm    LV Systolic Volume 47.52 mL    LV Systolic Volume Index 25.4 mL/m2    LVIDs 3.40 2.1 - 4.0 cm    LV Diastolic Volume 78.48 mL    LV Diastolic Volume Index 41.97 mL/m2    Left Ventricular End Systolic Volume by Teichholz Method 47.52 mL    Left Ventricular End Diastolic Volume by Teichholz Method 78.48 mL    IVS 1.53 (A) 0.6 - 1.1 cm    LVOT diameter 2.07 cm    LVOT area 3.4 cm2    FS 19 (A) 28 - 44 %    Left Ventricle Relative Wall Thickness 0.70 cm    Posterior Wall 1.47 (A) 0.6 - 1.1 cm    LV mass 249.50 g    LV Mass Index 133 g/m2    MV Peak E Bobby 1.00 m/s    TDI LATERAL 0.09 m/s    TDI SEPTAL 0.11 m/s    E/E' ratio 10.00 m/s    MV Peak A Bobby 0.83 m/s    TR Max Bobby 3.24 m/s    E/A ratio 1.20     IVRT 87.54 msec    E wave deceleration time 256.49 msec    LV SEPTAL E/E' RATIO 9.09 m/s    LV LATERAL E/E' RATIO 11.11 m/s    LA size 4.89 cm    Left Atrium Minor Axis 6.92 cm    Left Atrium Major Axis 7.90 cm    RA Major Axis 6.58 cm    AV regurgitation pressure 1/2 time 484.676043101666798 ms    AR Max Bobby 4.16 m/s    AV mean gradient 11 mmHg    AV peak gradient 20 mmHg    Ao peak bobby 2.25 m/s    Ao VTI 59.70 cm    MV stenosis pressure 1/2 time 74.38 ms    MV valve area p 1/2 method 2.96 cm2    Triscuspid Valve Regurgitation Peak Gradient 42 mmHg    PV PEAK VELOCITY 1.41 m/s    PV peak gradient 8 mmHg    Pulmonary Valve Mean Velocity 0.91 m/s    STJ 3.00 cm    IVC " diameter 1.08 cm    Mean e' 0.10 m/s    ZLVIDS 0.44     ZLVIDD -2.16     EF 55 %    TV resting pulmonary artery pressure 45 mmHg    RV TB RVSP 6 mmHg    Est. RA pres 3 mmHg    Narrative      Left Ventricle: The left ventricle is normal in size. Normal wall   thickness. There is concentric hypertrophy. There is normal systolic   function. Ejection fraction by visual approximation is 55%.    Right Ventricle: Normal right ventricular cavity size. Wall thickness   is normal. Systolic function is normal.    Aortic Valve: There is mild aortic regurgitation with a centrally   directed jet.    Pulmonary Artery: The estimated pulmonary artery systolic pressure is   45 mmHg.    IVC/SVC: Normal venous pressure at 3 mmHg.      and EKG: Reviewed  Assessment and Plan:       * CHF (congestive heart failure)  BNP 2367, received IV lasix x4. On nasal cannula  GDMT ARB, not on BB given bradycardia  Kidney function stable  Repeat BNP pending  Echo yesterday EF 55%  Nuc stress tomorrow  F/u in [facility]    7/16/24  Hold off on nuc stress today, diurese  BNP elevated  IV diuresis cont ARB  Strict I/Os monitor renal function    7/17/24  Cont IV diuresis, ARB    7/18/24  -Improving, BNP trending down  -Continue IV diuresis for additional day  -Continue ARB    07/20/2024  Continue lasix 40 mg ivp bid    07/21/2024  Continue lasix     7/22/2024  -Avoid AV giacomo agents given bradycardia  -Continue IV lasix 40 mg BID, Losartan  -Dash diet 2 gm sodium restriction  -1500 ml fluid restriction  -Update BNP in AM    7/23/24  -BNP trending down, CV wise remains stable  -Continue IV Lasix, ARB, hydralazine  -CRT-P planned tmw AM, keep NPO after MN    7/24/24  -Stable, continue ARB, hydralazine, IV Lasix  -CRT-P today by Dr. Rossi    7/25/24  -Stable, s/p CRT-P placement by Dr. Castro  -Continue ARB, hydralazine  -Lasix switched to po  -BB added    7/26/24  Cont ARB, PO lasix and BB  F/u in [facility]    AV block, Mobitz II  -s/p CRT-P    Elevated  troponin  -OP MPI stress test recommended    Bradycardia  HR 40s with PVCs, asymptomatic  Avoid AV giacomo blocking agents  F/u with primary cardiologist to monitor  Home med list with only HCTZ    7/16/24  EKG during stress today with int 2:1 AVB    7/17/24  EP consulted given int 2:1AVB, AV disassociation   Appreciate recs    7/18/24  -EP on board, CRT-P planned once more euvolemic    07/20/2024  Remains ben and AV dissociation  CRT-P early next week per EP    07/21/2024  Continue CHF Rx  CRT-P early next week per EP     7/23/24  -CRT-P planned tmw, keep NPO after MN    7/24/24  -CRT-P planned today    7/25/24  -s/p CRT-P      Acute hypoxemic respiratory failure  -Secondary to CHF, improved with IV diureiss     Hypertension  -Titrate medications, cont ARB    07/20/2024  Continue hydralazine and ARB  Avoid avn blocker due to ben and AV dissociation    7/25/24  -Meds adjusted today, monitor trend    7/26/24  stable    SOB (shortness of breath)  Improving since admission  BNP pending  Wean O2 as tolerated    7/16/24  BNP still elevated  Diurese    7/18/24  -Improving, continue IV Lasix    7/25/24  -Lasix switched to po    7/26/24  Improved  Cont PO lasix        VTE Risk Mitigation (From admission, onward)           Ordered     IP VTE HIGH RISK PATIENT  Once         07/13/24 2334     Place sequential compression device  Until discontinued         07/13/24 2334                    Yaa Salas, NP  Cardiology  [facility]

## 2024-07-26 NOTE — PT/OT/SLP PROGRESS
Physical Therapy  Treatment    Kaleigh Delgado   MRN: [id number]   Admitting Diagnosis: CHF (congestive heart failure)    PT Received On: 07/26/24  PT Start Time: 0840     PT Stop Time: 0905    PT Total Time (min): 25 min       Billable Minutes:  Gait Training 10 and Therapeutic Activity 15    Treatment Type: Treatment  PT/PTA: PTA     Number of PTA visits since last PT visit: 1       General Precautions: Standard, fall, pacemaker  Orthopedic Precautions: LUE non weight bearing  Braces: Sling and swathe  Respiratory Status: Room air    Spiritual, Cultural Beliefs, Yazdanism Practices, Values that Affect Care: no    Subjective:  Communicated with patient's nurse, Tangela, and completed Epic chart review prior to session.  Patient agreed to PT session.     Pain/Comfort  Pain Rating 1: 0/10  Pain Rating Post-Intervention 1: 0/10    Objective:   Patient found with: telemetry, peripheral IV, PureWick    Supine > sit EOB: Min A (increased time to complete)    Forward scoot towards EOB: Min A (increased time to complete)    STS from EOB WBQC: Min A (VC for R hand placement)    20ft w/ WBQC Min A     Stand pivot T/F to chair w/ WBQC: Min A     Reviewed AROM TE to BLE including: hip flex/ext, knee flex/ext, ankle PF/DF  To be completed a minimum of 10 reps for each LE in order to promote return of function, strength and ROM.     Educated patient on importance of increased tolerance to upright position and direct impact on CV endurance and strength. Patient encouraged to sit up in chair/ EOB, for a minimum of 2 consecutive hours, 3x per day. Encouraged patient to perform AROM TE to BLE throughout the day within all available planes of motion. Re enforced importance of utilizing call light to meet needs in room and not attempt to get up without staff assistance. Patient verbalized understanding and agreed to comply.        AM-PAC 6 CLICK MOBILITY  How much help from another person does this patient currently need?   1 = Unable,  Total/Dependent Assistance  2 = A lot, Maximum/Moderate Assistance  3 = A little, Minimum/Contact Guard/Supervision  4 = None, Modified [place]/Independent    Turning over in bed (including adjusting bedclothes, sheets and blankets)?: 3  Sitting down on and standing up from a chair with arms (e.g., wheelchair, bedside commode, etc.): 3  Moving from lying on back to sitting on the side of the bed?: 3  Moving to and from a bed to a chair (including a wheelchair)?: 3  Need to walk in hospital room?: 3  Climbing 3-5 steps with a railing?: 1 (NT)  Basic Mobility Total Score: 16    AM-PAC Raw Score CMS G-Code Modifier Level of Impairment Assistance   6 % Total / Unable   7 - 9 CM 80 - 100% Maximal Assist   10 - 14 CL 60 - 80% Moderate Assist   15 - 19 CK 40 - 60% Moderate Assist   20 - 22 CJ 20 - 40% Minimal Assist   23 CI 1-20% SBA / CGA   24 CH 0% Independent/ Mod I     Patient left up in chair with call button in reach and chair alarm on.    Assessment:  Kaleigh Delgado is a 74 y.o. female with a medical diagnosis of CHF (congestive heart failure) and presents with overall decline in functional mobility. Patient would continue to benefit from skilled PT to address functional limitations listed below in order to return to PLOF/decrease caregiver burden.     Rehab identified problem list/impairments: weakness, impaired endurance, impaired self care skills, impaired functional mobility, gait instability, impaired balance, decreased safety awareness, decreased lower extremity function, decreased upper extremity function, decreased coordination, decreased ROM, impaired cardiopulmonary response to activity, other (comment) (PACEMAKER RESTRICTIONS)    Rehab potential is fair.    Activity tolerance: Fair    Discharge recommendations: Moderate Intensity Therapy      Barriers to discharge:      Equipment recommendations: to be determined by next level of care     GOALS:   Multidisciplinary Problems       Physical  Therapy Goals          Problem: Physical Therapy    Goal Priority Disciplines Outcome Goal Variances Interventions   Physical Therapy Goal     PT, PT/OT Progressing     Description: Goals to be met by 8/8/24.  1. Pt will complete bed mobility SBA.  2. Pt will complete sit to stand SBA.  3. Pt will ambulate 200ft SBA using LRAD.  4. Pt will increase AMPAC score by 2 points to progress functional mobility.                       PLAN:    Patient to be seen 3 x/week to address the above listed problems via gait training, therapeutic activities, therapeutic exercises  Plan of Care expires: 08/08/24  Plan of Care reviewed with: patient         07/26/2024

## 2024-07-27 NOTE — DISCHARGE SUMMARY
[facility]  Hospital Medicine  Discharge Summary      Patient Name: Kaleigh Delgado  MRN: [id number]  [facility]: [id number]  Patient Class: IP- Inpatient  Admission Date: 7/13/2024  Hospital Length of Stay: 13 days  Discharge Date and Time: 7/26/2024  7:00 PM  Attending Physician: Zaina Lombardi MD  Discharging Provider: FLYNN Bajwa  Primary Care Provider: Aldair Kaur MD    Primary Care Team: [facility] MEDICINE A    HPI:   Kaleigh Delgado is a 74 y.o. female with a PMH  has no past medical history on file. who presented to the ED for further evaluation of worsening dyspnea/shortness of breath and bilateral leg swelling x2 days duration.  Patient denies prior history of CHF and is not currently on home O2 or diuretics.  Given worsening symptoms, patient called EMS and was found to be hypoxic with O2 saturation 88% on room air and was initiated on supplemental oxygen at 6 L via nasal cannula but was titrated up to 15 L non-rebreather.  Patient reported no known alleviating factors, and aggravating factors including laying flat and with exertion.  She denied endorsing any lightheadedness, dizziness, headache, visual changes, fever, chills, sweats, nausea, vomiting, chest pain, abdominal pain, dysuria, hematuria, melena, hematochezia, diarrhea, or onset neurological deficits.  Prior to onset of symptoms, patient reported being in her usual state of health with no other concerns or complaints.  All other review of systems negative except as noted above.  Initial workup in the ED revealed patient to be tachypneic and hypoxic with elevated D-dimer measuring 1.30.  BNP 03/20/2067, troponin 0.090, flu/COVID negative, UA negative for UTI. CTA positive for mild to moderate pulmonary edema, mild left pleural effusion, small right pleural effusion, but negative for pulmonary embolus.  Patient admitted to Hospital Medicine inpatient for continued medical management and workup of new onset heart failure.    PCP: Sharron,  Primary Doctor      Procedure(s) (LRB):  INSERTION, PACEMAKER, BIVENTRICULAR (Left)      Hospital Course:   74 y.o. female with no past medical history on file admitted for new onset of CHF. Patient denies history of CHF or home oxygen use. Patient is currently for Hctz 12.5 mg daily for blood pressure management. Ed work up revealed elevated D-dimer measuring 1.30. BNP 2367, troponin 0.090, flu/COVID negative, UA negative for UTI. CTA positive for mild to moderate pulmonary edema, mild left pleural effusion, small right pleural effusion, but negative for pulmonary embolus. CXR showed cardiomegaly with perihilar edema. Correlate clinically to CHF. Trace pleural effusions. Correlate clinically to CHF. Echo resulted with a 55% EF. Cardiology following.    Patient s/p CRT pacemaker placed on 07/24/24. Incision dressing, CDI. No redness or swelling noted. Post-op EKG showed atrial-sensed ventricular-paced rhythm with occasional premature ventricular complexes.  Reported left chest wall discomfort from procedure. Controlled with PRN pain medication. Left arm is to be kept immobilized with sling for 5 days. H/H is stable. HR 70s-80s. BNP on 07/23 was 463, improving. Denies nausea, vomiting, or abdominal pain this morning. Tolerating diet. Significant improvement of SOB with diuresis. Left groin pain improved significantly and controlled with PRN pain medication. Patient accepted to [place] for SNF, cleared by cardiology to discharge. Patient seen and examined, deemed stable for discharge.      Goals of Care Treatment Preferences:  Code Status: Full Code      Consults:   Consults (From admission, onward)          Status Ordering Provider     Inpatient consult to Social Work  Once        Provider:  (Not yet assigned)    Completed JOAN BALES     Inpatient consult to Electrophysiology  Once        Provider:  Ayde Dubon FNP-C    Completed CATERINA ALVARES     Inpatient consult to Cardiology  Once         Provider:  Lito Kruger MD    Completed PIETER BARROSO     Inpatient consult to Social Work/Case Management  Once        Provider:  (Not yet assigned)    Completed PIETER BARROSO     Inpatient consult to Registered Dietitian/Nutritionist  Once        Provider:  (Not yet assigned)    Completed PIETER BARROSO            No new Assessment & Plan notes have been filed under this hospital service since the last note was generated.  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    Specialty: Family Medicine  Why: Within 1 week after SNF discharge  Contact information:  [address] [facility] [address]  [facility] [address] [phone]               Rehab, [facility] And Follow up.    Why: SNF  Contact information:  [address] [place] [address] [phone]                           Patient Instructions:      Diet Cardiac     Activity as tolerated       Significant Diagnostic Studies: Labs: All labs within the past 24 hours have been reviewed    Pending Diagnostic Studies:       Procedure Component Value Units Date/Time    EKG 12-lead [[id number]]     Order Status: Sent Lab Status: No result             Medications:  Reconciled Home Medications:      Medication List        START taking these medications      cefadroxil 500 MG Cap  Commonly known as: DURICEF  Take 1 capsule (500 mg total) by mouth every 12 (twelve) hours. Take 2 caps tomorrow when you get home then 1 caps every 12 hrs until done.     furosemide 40 MG tablet  Commonly known as: LASIX  Take 1 tablet (40 mg total) by mouth once daily.     hydrALAZINE 10 MG tablet  Commonly known as: APRESOLINE  Take 1 tablet (10 mg total) by mouth every 8 (eight) hours.     HYDROcodone-acetaminophen  mg per tablet  Commonly known as: NORCO  Take 1 tablet by mouth every 6 (six) hours as needed for Pain. Please use sparingly. Start with 1/2 tablet per dose - it may be sufficient.     losartan 50 MG tablet  Commonly known as: COZAAR  Take 1 tablet (50 mg total) by mouth once daily.     metoprolol succinate 50 MG 24 hr tablet  Commonly known as: TOPROL-XL  Take 1 tablet (50 mg total) by mouth once daily.            STOP taking these medications      hydroCHLOROthiazide 12.5 mg capsule  Commonly known as: MICROZIDE              Indwelling Lines/Drains at time of discharge:   Lines/Drains/Airways       None                   Time spent on the discharge of patient: 45 minutes         FLYNN Bajwa  Department of Hospital Medicine  [facility]

## 2024-07-29 ENCOUNTER — DOCUMENTATION ONLY (OUTPATIENT)
Dept: CARDIOLOGY | Facility: CLINIC | Age: 74
End: 2024-07-29
Payer: MEDICARE

## 2024-07-29 NOTE — PROGRESS NOTES
[facility]) hospital discharge 48-72 hour phone follow up completed.     Most Recent Hospital Discharge Date: 7/26  Last admission Diagnosis/chief complaint:CHF    TCC nurse Navigator spoke with nurse    Current Patient reported weight: 137 lbs   Pt  was discharged to [place] [facility]. Spoke with nurse who says she is doing ok. Current B/P 138/74. Appt was cancelled by pt. No answer to cell phone. Left message with nurse for her to call back.

## 2024-07-30 ENCOUNTER — HOSPITAL ENCOUNTER (OUTPATIENT)
Dept: CARDIOLOGY | Facility: HOSPITAL | Age: 74
Discharge: HOME | End: 2024-07-30
Attending: INTERNAL MEDICINE
Payer: MEDICARE

## 2024-07-30 DIAGNOSIS — I45.89 ATRIOVENTRICULAR (AV) DISSOCIATION: ICD-10-CM

## 2024-07-30 DIAGNOSIS — R00.1 BRADYCARDIA: ICD-10-CM

## 2024-07-30 DIAGNOSIS — I50.9 CONGESTIVE HEART FAILURE, UNSPECIFIED HF CHRONICITY, UNSPECIFIED HEART FAILURE TYPE: ICD-10-CM

## 2024-07-30 PROCEDURE — 93281 PM DEVICE PROGR EVAL MULTI: CPT

## 2024-07-30 PROCEDURE — 93005 ELECTROCARDIOGRAM TRACING: CPT

## 2024-07-30 PROCEDURE — 93010 ELECTROCARDIOGRAM REPORT: CPT | Mod: ,,, | Performed by: INTERNAL MEDICINE

## 2024-08-01 ENCOUNTER — TELEPHONE (OUTPATIENT)
Dept: CARDIOLOGY | Facility: CLINIC | Age: 74
End: 2024-08-01
Payer: MEDICARE

## 2024-08-01 DIAGNOSIS — I49.3 FREQUENT PVCS: Primary | ICD-10-CM

## 2024-08-01 NOTE — TELEPHONE ENCOUNTER
Spoke to patient reviewed results with her. Scheduled 48 hour monitor. Rodrigo    Left message for patient to call back to clinic to discuss results.RODRIGO    Please call patient    Device check reviewed with Dr Rossi  High PVC burden >20% since implant    Needs 48 hour monitor to further evaluate PVCs  Please arrange appt.     Thanks  FLYNN Armas

## 2024-08-01 NOTE — TELEPHONE ENCOUNTER
Left message for patient to call back to clinic to discuss results.KA    Please call patient    Device check reviewed with Dr Rossi  High PVC burden >20% since implant    Needs 48 hour monitor to further evaluate PVCs  Please arrange appt.     Thanks  FLYNN Armas

## 2024-08-01 NOTE — TELEPHONE ENCOUNTER
Please call patient    Device check reviewed with Dr Rossi  High PVC burden >20% since implant    Needs 48 hour monitor to further evaluate PVCs  Please arrange appt.     Thanks  FLYNN Armas

## 2024-08-02 ENCOUNTER — HOSPITAL ENCOUNTER (OUTPATIENT)
Dept: CARDIOLOGY | Facility: HOSPITAL | Age: 74
Discharge: HOME | End: 2024-08-02
Attending: NURSE PRACTITIONER
Payer: MEDICARE

## 2024-08-02 DIAGNOSIS — I49.3 FREQUENT PVCS: ICD-10-CM

## 2024-08-02 LAB
OHS QRS DURATION: 118 MS
OHS QTC CALCULATION: 452 MS

## 2024-08-19 PROBLEM — I49.9 CARDIAC ARRHYTHMIA: Status: ACTIVE | Noted: 2024-08-19

## 2024-08-24 ENCOUNTER — CLINICAL SUPPORT (OUTPATIENT)
Dept: CARDIOLOGY | Facility: HOSPITAL | Age: 74
End: 2024-08-24
Payer: MEDICARE

## 2024-08-24 ENCOUNTER — CLINICAL SUPPORT (OUTPATIENT)
Dept: CARDIOLOGY | Facility: HOSPITAL | Age: 74
End: 2024-08-24
Attending: INTERNAL MEDICINE
Payer: MEDICARE

## 2024-08-24 DIAGNOSIS — R00.1 BRADYCARDIA, UNSPECIFIED: ICD-10-CM

## 2024-08-24 DIAGNOSIS — Z95.0 PRESENCE OF CARDIAC PACEMAKER: ICD-10-CM

## 2024-08-24 PROCEDURE — 93294 REM INTERROG EVL PM/LDLS PM: CPT | Mod: S$GLB,,, | Performed by: INTERNAL MEDICINE

## 2024-08-24 PROCEDURE — 93296 REM INTERROG EVL PM/IDS: CPT | Performed by: INTERNAL MEDICINE

## 2024-08-31 LAB
OHS CV AF BURDEN PERCENT: < 1
OHS CV BIV PACING PERCENT: 92 %
OHS CV DC REMOTE DEVICE TYPE: NORMAL

## 2024-09-05 ENCOUNTER — PATIENT MESSAGE (OUTPATIENT)
Dept: CARDIOLOGY | Facility: HOSPITAL | Age: 74
End: 2024-09-05
Payer: MEDICARE

## 2024-09-11 ENCOUNTER — PATIENT MESSAGE (OUTPATIENT)
Dept: CARDIOLOGY | Facility: HOSPITAL | Age: 74
End: 2024-09-11
Payer: MEDICARE

## 2024-10-14 PROBLEM — J96.01 ACUTE HYPOXEMIC RESPIRATORY FAILURE: Status: RESOLVED | Noted: 2024-07-14 | Resolved: 2024-10-14

## 2024-10-24 ENCOUNTER — PATIENT MESSAGE (OUTPATIENT)
Dept: CARDIOLOGY | Facility: CLINIC | Age: 74
End: 2024-10-24
Payer: MEDICARE

## 2024-10-25 ENCOUNTER — CLINICAL SUPPORT (OUTPATIENT)
Dept: CARDIOLOGY | Facility: HOSPITAL | Age: 74
End: 2024-10-25

## 2024-10-25 DIAGNOSIS — R00.1 BRADYCARDIA, UNSPECIFIED: ICD-10-CM

## 2024-10-25 DIAGNOSIS — Z95.0 PRESENCE OF CARDIAC PACEMAKER: ICD-10-CM

## 2024-10-29 ENCOUNTER — OFFICE VISIT (OUTPATIENT)
Dept: CARDIOLOGY | Facility: CLINIC | Age: 74
End: 2024-10-29
Payer: MEDICARE

## 2024-10-29 ENCOUNTER — HOSPITAL ENCOUNTER (OUTPATIENT)
Dept: CARDIOLOGY | Facility: HOSPITAL | Age: 74
Discharge: HOME | End: 2024-10-29
Attending: INTERNAL MEDICINE
Payer: MEDICARE

## 2024-10-29 VITALS
HEIGHT: 70 IN | WEIGHT: 129.88 LBS | SYSTOLIC BLOOD PRESSURE: 146 MMHG | OXYGEN SATURATION: 97 % | BODY MASS INDEX: 18.59 KG/M2 | DIASTOLIC BLOOD PRESSURE: 78 MMHG | HEART RATE: 60 BPM

## 2024-10-29 DIAGNOSIS — I50.9 CONGESTIVE HEART FAILURE, UNSPECIFIED HF CHRONICITY, UNSPECIFIED HEART FAILURE TYPE: ICD-10-CM

## 2024-10-29 DIAGNOSIS — Z95.0 PRESENCE OF CARDIAC PACEMAKER: Primary | ICD-10-CM

## 2024-10-29 DIAGNOSIS — I44.1 AV BLOCK, MOBITZ II: ICD-10-CM

## 2024-10-29 DIAGNOSIS — I48.0 PAROXYSMAL ATRIAL FIBRILLATION: ICD-10-CM

## 2024-10-29 DIAGNOSIS — I50.22 HEART FAILURE WITH MILDLY REDUCED EJECTION FRACTION (HFMREF): ICD-10-CM

## 2024-10-29 DIAGNOSIS — R00.1 BRADYCARDIA: ICD-10-CM

## 2024-10-29 DIAGNOSIS — I45.89 ATRIOVENTRICULAR (AV) DISSOCIATION: ICD-10-CM

## 2024-10-29 DIAGNOSIS — I10 PRIMARY HYPERTENSION: ICD-10-CM

## 2024-10-29 DIAGNOSIS — Z95.0 PRESENCE OF CARDIAC RESYNCHRONIZATION THERAPY PACEMAKER (CRT-P): ICD-10-CM

## 2024-10-29 DIAGNOSIS — I50.42 CHRONIC COMBINED SYSTOLIC AND DIASTOLIC CONGESTIVE HEART FAILURE: Primary | ICD-10-CM

## 2024-10-29 DIAGNOSIS — I50.42 CHRONIC COMBINED SYSTOLIC AND DIASTOLIC CONGESTIVE HEART FAILURE: ICD-10-CM

## 2024-10-29 PROCEDURE — 3077F SYST BP >= 140 MM HG: CPT | Mod: CPTII,S$GLB,, | Performed by: NURSE PRACTITIONER

## 2024-10-29 PROCEDURE — 3008F BODY MASS INDEX DOCD: CPT | Mod: CPTII,S$GLB,, | Performed by: NURSE PRACTITIONER

## 2024-10-29 PROCEDURE — 93281 PM DEVICE PROGR EVAL MULTI: CPT | Mod: 26,,, | Performed by: INTERNAL MEDICINE

## 2024-10-29 PROCEDURE — 99999 PR PBB SHADOW E&M-EST. PATIENT-LVL III: CPT | Mod: PBBFAC,,, | Performed by: NURSE PRACTITIONER

## 2024-10-29 PROCEDURE — 3288F FALL RISK ASSESSMENT DOCD: CPT | Mod: CPTII,S$GLB,, | Performed by: NURSE PRACTITIONER

## 2024-10-29 PROCEDURE — 1126F AMNT PAIN NOTED NONE PRSNT: CPT | Mod: CPTII,S$GLB,, | Performed by: NURSE PRACTITIONER

## 2024-10-29 PROCEDURE — 93281 PM DEVICE PROGR EVAL MULTI: CPT

## 2024-10-29 PROCEDURE — 3078F DIAST BP <80 MM HG: CPT | Mod: CPTII,S$GLB,, | Performed by: NURSE PRACTITIONER

## 2024-10-29 PROCEDURE — 4010F ACE/ARB THERAPY RXD/TAKEN: CPT | Mod: CPTII,S$GLB,, | Performed by: NURSE PRACTITIONER

## 2024-10-29 PROCEDURE — 99214 OFFICE O/P EST MOD 30 MIN: CPT | Mod: S$GLB,,, | Performed by: NURSE PRACTITIONER

## 2024-10-29 PROCEDURE — 3044F HG A1C LEVEL LT 7.0%: CPT | Mod: CPTII,S$GLB,, | Performed by: NURSE PRACTITIONER

## 2024-10-29 PROCEDURE — 1159F MED LIST DOCD IN RCRD: CPT | Mod: CPTII,S$GLB,, | Performed by: NURSE PRACTITIONER

## 2024-10-29 PROCEDURE — 1101F PT FALLS ASSESS-DOCD LE1/YR: CPT | Mod: CPTII,S$GLB,, | Performed by: NURSE PRACTITIONER

## 2024-10-29 RX ORDER — FUROSEMIDE 40 MG/1
40 TABLET ORAL DAILY PRN
Qty: 30 TABLET | Refills: 0 | Status: SHIPPED | OUTPATIENT
Start: 2024-10-29 | End: 2024-11-28

## 2024-11-01 LAB
OHS CV AF BURDEN PERCENT: < 1
OHS CV BIV PACING PERCENT: 91 %
OHS CV DC REMOTE DEVICE TYPE: NORMAL
OHS CV RV PACING PERCENT: 0 %

## 2024-11-23 ENCOUNTER — CLINICAL SUPPORT (OUTPATIENT)
Dept: CARDIOLOGY | Facility: HOSPITAL | Age: 74
End: 2024-11-23
Attending: INTERNAL MEDICINE
Payer: MEDICARE

## 2024-11-23 DIAGNOSIS — R00.1 BRADYCARDIA, UNSPECIFIED: ICD-10-CM

## 2024-11-23 DIAGNOSIS — Z95.0 PRESENCE OF CARDIAC PACEMAKER: ICD-10-CM

## 2024-11-23 PROCEDURE — 93296 REM INTERROG EVL PM/IDS: CPT | Performed by: INTERNAL MEDICINE

## 2024-11-23 PROCEDURE — 93294 REM INTERROG EVL PM/LDLS PM: CPT | Mod: S$GLB,,, | Performed by: INTERNAL MEDICINE

## 2024-11-27 LAB
OHS CV AF BURDEN PERCENT: < 1
OHS CV BIV PACING PERCENT: 97 %
OHS CV DC REMOTE DEVICE TYPE: NORMAL

## 2025-01-26 ENCOUNTER — CLINICAL SUPPORT (OUTPATIENT)
Dept: CARDIOLOGY | Facility: HOSPITAL | Age: 75
End: 2025-01-26

## 2025-01-26 DIAGNOSIS — R00.1 BRADYCARDIA, UNSPECIFIED: ICD-10-CM

## 2025-01-26 DIAGNOSIS — Z95.0 PRESENCE OF CARDIAC PACEMAKER: ICD-10-CM

## 2025-02-22 ENCOUNTER — CLINICAL SUPPORT (OUTPATIENT)
Dept: CARDIOLOGY | Facility: HOSPITAL | Age: 75
End: 2025-02-22
Attending: INTERNAL MEDICINE
Payer: MEDICARE

## 2025-02-22 DIAGNOSIS — R00.1 BRADYCARDIA, UNSPECIFIED: ICD-10-CM

## 2025-02-22 DIAGNOSIS — Z95.0 PRESENCE OF CARDIAC PACEMAKER: ICD-10-CM

## 2025-02-22 PROCEDURE — 93296 REM INTERROG EVL PM/IDS: CPT | Performed by: INTERNAL MEDICINE

## 2025-02-22 PROCEDURE — 93294 REM INTERROG EVL PM/LDLS PM: CPT | Mod: S$GLB,,, | Performed by: INTERNAL MEDICINE

## 2025-03-14 LAB
OHS CV AF BURDEN PERCENT: < 1
OHS CV BIV PACING PERCENT: 97 %
OHS CV DC REMOTE DEVICE TYPE: NORMAL

## 2025-04-28 ENCOUNTER — TELEPHONE (OUTPATIENT)
Dept: CARDIOLOGY | Facility: CLINIC | Age: 75
End: 2025-04-28
Payer: MEDICARE

## 2025-04-28 DIAGNOSIS — I10 HYPERTENSION, UNSPECIFIED TYPE: Primary | ICD-10-CM

## 2025-04-29 ENCOUNTER — HOSPITAL ENCOUNTER (OUTPATIENT)
Dept: CARDIOLOGY | Facility: HOSPITAL | Age: 75
Discharge: HOME | End: 2025-04-29
Payer: MEDICARE

## 2025-04-29 ENCOUNTER — OFFICE VISIT (OUTPATIENT)
Dept: CARDIOLOGY | Facility: CLINIC | Age: 75
End: 2025-04-29
Payer: MEDICARE

## 2025-04-29 VITALS
SYSTOLIC BLOOD PRESSURE: 168 MMHG | OXYGEN SATURATION: 98 % | HEIGHT: 70 IN | BODY MASS INDEX: 18.63 KG/M2 | HEART RATE: 42 BPM | DIASTOLIC BLOOD PRESSURE: 78 MMHG

## 2025-04-29 DIAGNOSIS — I47.10 SVT (SUPRAVENTRICULAR TACHYCARDIA): ICD-10-CM

## 2025-04-29 DIAGNOSIS — Z95.0 PRESENCE OF CARDIAC RESYNCHRONIZATION THERAPY PACEMAKER (CRT-P): ICD-10-CM

## 2025-04-29 DIAGNOSIS — I50.22 HEART FAILURE WITH MILDLY REDUCED EJECTION FRACTION (HFMREF): Primary | ICD-10-CM

## 2025-04-29 DIAGNOSIS — I10 HYPERTENSION, UNSPECIFIED TYPE: ICD-10-CM

## 2025-04-29 DIAGNOSIS — I49.9 CARDIAC ARRHYTHMIA, UNSPECIFIED CARDIAC ARRHYTHMIA TYPE: ICD-10-CM

## 2025-04-29 LAB
OHS QRS DURATION: 146 MS
OHS QTC CALCULATION: 506 MS

## 2025-04-29 PROCEDURE — 1101F PT FALLS ASSESS-DOCD LE1/YR: CPT | Mod: CPTII,S$GLB,, | Performed by: NURSE PRACTITIONER

## 2025-04-29 PROCEDURE — 3288F FALL RISK ASSESSMENT DOCD: CPT | Mod: CPTII,S$GLB,, | Performed by: NURSE PRACTITIONER

## 2025-04-29 PROCEDURE — 4010F ACE/ARB THERAPY RXD/TAKEN: CPT | Mod: CPTII,S$GLB,, | Performed by: NURSE PRACTITIONER

## 2025-04-29 PROCEDURE — 3078F DIAST BP <80 MM HG: CPT | Mod: CPTII,S$GLB,, | Performed by: NURSE PRACTITIONER

## 2025-04-29 PROCEDURE — 99999 PR PBB SHADOW E&M-EST. PATIENT-LVL III: CPT | Mod: PBBFAC,,, | Performed by: NURSE PRACTITIONER

## 2025-04-29 PROCEDURE — 1126F AMNT PAIN NOTED NONE PRSNT: CPT | Mod: CPTII,S$GLB,, | Performed by: NURSE PRACTITIONER

## 2025-04-29 PROCEDURE — 3077F SYST BP >= 140 MM HG: CPT | Mod: CPTII,S$GLB,, | Performed by: NURSE PRACTITIONER

## 2025-04-29 PROCEDURE — 93005 ELECTROCARDIOGRAM TRACING: CPT

## 2025-04-29 PROCEDURE — 3008F BODY MASS INDEX DOCD: CPT | Mod: CPTII,S$GLB,, | Performed by: NURSE PRACTITIONER

## 2025-04-29 PROCEDURE — 93010 ELECTROCARDIOGRAM REPORT: CPT | Mod: ,,, | Performed by: INTERNAL MEDICINE

## 2025-04-29 PROCEDURE — 99214 OFFICE O/P EST MOD 30 MIN: CPT | Mod: S$GLB,,, | Performed by: NURSE PRACTITIONER

## 2025-04-29 NOTE — PROGRESS NOTES
Subjective:   Patient ID:  Kaleigh Delgado is a 75 y.o. female who presents for evaluation of Follow-up      HPI    Kaleigh Delgado is a 75 year old female who presents to Arrhythmia clinic for follow up. Her current medical conditions include PAF, PVCs, CRT-P, HTN.     Using walker for fall prevention.     Denies chest pain or anginal equivalents. No shortness of breath, ROSAS or palpitations. Denies orthopnea, PND or abdominal bloating. Reports regular walking without any issues lately. NO leg swelling or claudications. No recent falls, syncope or near syncopal events. Reports compliance with medications and dietary restrictions. NO CNS complaints to suggest TIA or CVA today. No signs of abnormal bleeding.     BP elevated today.     Taking lasix PRN for shortness of breath.     History reviewed. No pertinent past medical history.    Past Surgical History:   Procedure Laterality Date    exploratory lap      FIRST RIB REMOVAL      IMPLANTATION OF BIVENTRICULAR HEART PACEMAKER Left 7/24/2024    Procedure: INSERTION, PACEMAKER, BIVENTRICULAR;  Surgeon: Leonel Rossi MD;  Location: [facility] C[facility] LAB;  Service: Cardiology;  Laterality: Left;  st gail    KNEE SURGERY      TONSILLECTOMY      VARICOSE VEIN SURGERY         Social History[1]    Family History   Family history unknown: Yes       Wt Readings from Last 3 Encounters:   10/29/24 58.9 kg (129 lb 13.6 oz)   07/25/24 67.3 kg (148 lb 5.9 oz)   07/22/13 63.5 kg (140 lb)     Temp Readings from Last 3 Encounters:   07/26/24 98 °F (36.7 °C) (Oral)   01/23/24 98.2 °F (36.8 °C) (Oral)   07/22/13 98.6 °F (37 °C) (Oral)     BP Readings from Last 3 Encounters:   04/29/25 (!) 168/78   10/29/24 (!) 146/78   07/26/24 (!) 120/56     Pulse Readings from Last 3 Encounters:   04/29/25 (!) 42   10/29/24 60   07/26/24 74       Medications Ordered Prior to Encounter[2]    No cardiac monitor results found for the past 12 months    Results for orders placed during the hospital encounter of  07/13/24    Echo    Interpretation Summary    Left Ventricle: The left ventricle is normal in size. Normal wall thickness. There is concentric hypertrophy. There is normal systolic function. Ejection fraction by visual approximation is 55%.    Right Ventricle: Normal right ventricular cavity size. Wall thickness is normal. Systolic function is normal.    Aortic Valve: There is mild aortic regurgitation with a centrally directed jet.    Pulmonary Artery: The estimated pulmonary artery systolic pressure is 45 mmHg.    IVC/SVC: Normal venous pressure at 3 mmHg.    No results found for this or any previous visit.        Results for orders placed or performed during the hospital encounter of 07/13/24   EKG 12-lead    Collection Time: 07/30/24 11:29 AM   Result Value Ref Range    QRS Duration 118 ms    [facility] QTC Calculation 452 ms    Narrative    Test Reason : I49.9,    Vent. Rate : 077 BPM     Atrial Rate : 064 BPM     P-R Int : 118 ms          QRS Dur : 118 ms      QT Int : 400 ms       P-R-T Axes : 037 104 -48 degrees     QTc Int : 452 ms    AV dual-paced complexes and with frequent Premature ventricular complexes  Abnormal ECG  When compared with ECG of 24-JUL-2024 18:40,  Vent. rate has decreased BY   4 BPM  Confirmed by MD SERVANDO, MAURICIO ([id number]) on 8/2/2024 8:34:01 AM    Referred By: [email or web address]   SELF           Confirmed By:MAURICIO AL MD         Review of Systems   Constitutional: Positive for malaise/fatigue.   HENT:  Negative for hearing loss and hoarse voice.    Eyes:  Negative for blurred vision and visual disturbance.   Cardiovascular:  Negative for chest pain, claudication, dyspnea on exertion, irregular heartbeat, leg swelling, near-syncope, orthopnea, palpitations, paroxysmal nocturnal dyspnea and syncope.   Respiratory:  Negative for cough, hemoptysis, shortness of breath, sleep disturbances due to breathing, snoring and wheezing.    Endocrine: Negative for cold intolerance and heat intolerance.  "  Hematologic/Lymphatic: Does not bruise/bleed easily.   Skin:  Negative for color change, dry skin and nail changes.   Musculoskeletal:  Positive for arthritis and back pain.   Gastrointestinal:  Negative for bloating, abdominal pain, constipation, nausea and vomiting.   Genitourinary:  Negative for dysuria, flank pain, hematuria and hesitancy.   Neurological:  Negative for headaches, light-headedness, loss of balance, numbness, paresthesias and weakness.   Psychiatric/Behavioral:  Negative for altered mental status.          Objective:BP (!) 168/78 (BP Location: Right arm)   Pulse (!) 42   Ht 5' 10" (1.778 m)   SpO2 98%   BMI 18.63 kg/m²      Physical Exam  Vitals and nursing note reviewed.   Constitutional:       General: She is not in acute distress.     Appearance: Normal appearance. She is well-developed. She is not ill-appearing.   HENT:      Head: Normocephalic and atraumatic.      Nose: Nose normal.      Mouth/Throat:      Mouth: Mucous membranes are moist.   Eyes:      Pupils: Pupils are equal, round, and reactive to light.   Neck:      Thyroid: No thyromegaly.      Vascular: No JVD.      Trachea: No tracheal deviation.   Cardiovascular:      Rate and Rhythm: Normal rate and regular rhythm.      Chest Wall: PMI is not displaced.      Pulses: Intact distal pulses.           Radial pulses are 2+ on the right side and 2+ on the left side.        Dorsalis pedis pulses are 2+ on the right side and 2+ on the left side.      Heart sounds: S1 normal and S2 normal. No murmur heard.  Pulmonary:      Effort: Pulmonary effort is normal. No respiratory distress.      Breath sounds: Normal breath sounds. No wheezing.   Abdominal:      General: Bowel sounds are normal. There is no distension.      Palpations: Abdomen is soft.      Tenderness: There is no abdominal tenderness.   Musculoskeletal:         General: No swelling. Normal range of motion.      Cervical back: Full passive range of motion without pain, normal " "range of motion and neck supple.      Right lower leg: No edema.      Left lower leg: No edema.      Right ankle: No swelling.      Left ankle: No swelling.   Skin:     General: Skin is warm and dry.      Capillary Refill: Capillary refill takes less than 2 seconds.      Nails: There is no clubbing.   Neurological:      General: No focal deficit present.      Mental Status: She is alert and oriented to person, place, and time.      Motor: Weakness present.   Psychiatric:         Speech: Speech normal.         Behavior: Behavior normal.         Thought Content: Thought content normal.         Judgment: Judgment normal.         Lab Results   Component Value Date    CHOL 193 07/07/2023    CHOL 217 (H) 06/16/2022    CHOL 196 12/22/2020     Lab Results   Component Value Date    HDL 78 07/07/2023    HDL 76 06/16/2022    HDL 75 12/22/2020     Lab Results   Component Value Date    LDLCALC 98 07/07/2023    LDLCALC 122 (H) 06/16/2022    LDLCALC 108 (H) 12/22/2020     Lab Results   Component Value Date    TRIG 99 07/07/2023    TRIG 107 06/16/2022    TRIG 74 12/22/2020     No results found for: "CHOLHDL"    Chemistry        Component Value Date/Time     07/26/2024 0317    K 4.0 07/26/2024 0317     07/26/2024 0317    CO2 26 07/26/2024 0317    BUN 41 (H) 07/26/2024 0317    CREATININE 1.2 07/26/2024 0317    GLU 95 07/26/2024 0317        Component Value Date/Time    CALCIUM 8.4 (L) 07/26/2024 0317    ALKPHOS 68 07/24/2024 0425    AST 19 07/24/2024 0425    ALT 16 07/24/2024 0425    BILITOT 0.7 07/24/2024 0425          Lab Results   Component Value Date    TSH 2.123 07/14/2024     No results found for: "INR", "PROTIME"  Lab Results   Component Value Date    WBC 4.72 07/25/2024    HGB 10.5 (L) 07/25/2024    HCT 32.6 (L) 07/25/2024    MCV 99 (H) 07/25/2024     07/25/2024          Assessment:      1. 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)    2. Presence of cardiac resynchronization therapy " pacemaker (CRT-P)    3. Cardiac arrhythmia, unspecified cardiac arrhythmia type    4. SVT (supraventricular tachycardia)        Plan:     Continue BB, ARB  Use Lsix 40 mg daily PRN for shortness of breath   DAsh diet 2 gm sodium restriction  Continue home device monitoring and annual in office interrogation  RTC in 6 m with device check    Nicole May, FNP-C [facility]         [1]   Social History  Tobacco Use    Smoking status: Never   Substance Use Topics    Alcohol use: No    Drug use: No   [2]   Current Outpatient Medications on File Prior to Visit   Medication Sig Dispense Refill    furosemide (LASIX) 40 MG tablet Take 1 tablet (40 mg total) by mouth daily as needed (shortness of breath.). 30 tablet 0    losartan (COZAAR) 50 MG tablet Take 1 tablet (50 mg total) by mouth once daily. 30 tablet 0    metoprolol succinate (TOPROL-XL) 50 MG 24 hr tablet Take 1 tablet (50 mg total) by mouth once daily. 30 tablet 0    [DISCONTINUED] cefadroxil (DURICEF) 500 MG Cap Take 1 capsule (500 mg total) by mouth every 12 (twelve) hours. Take 2 caps tomorrow when you get home then 1 caps every 12 hrs until done. 6 capsule 0    [DISCONTINUED] hydrALAZINE (APRESOLINE) 10 MG tablet Take 1 tablet (10 mg total) by mouth every 8 (eight) hours. 90 tablet 0     No current facility-administered medications on file prior to visit.

## 2025-05-30 ENCOUNTER — CLINICAL SUPPORT (OUTPATIENT)
Dept: CARDIOLOGY | Facility: HOSPITAL | Age: 75
End: 2025-05-30
Attending: INTERNAL MEDICINE
Payer: MEDICARE

## 2025-05-30 ENCOUNTER — CLINICAL SUPPORT (OUTPATIENT)
Dept: CARDIOLOGY | Facility: HOSPITAL | Age: 75
End: 2025-05-30
Payer: MEDICARE

## 2025-05-30 DIAGNOSIS — Z95.0 PRESENCE OF CARDIAC PACEMAKER: ICD-10-CM

## 2025-05-30 DIAGNOSIS — R00.1 BRADYCARDIA, UNSPECIFIED: ICD-10-CM

## 2025-05-30 PROCEDURE — 93296 REM INTERROG EVL PM/IDS: CPT | Performed by: INTERNAL MEDICINE

## 2025-05-30 PROCEDURE — 93294 REM INTERROG EVL PM/LDLS PM: CPT | Mod: S$GLB,,, | Performed by: INTERNAL MEDICINE

## 2025-06-24 LAB
OHS CV AF BURDEN PERCENT: < 1
OHS CV BIV PACING PERCENT: 97 %
OHS CV DC REMOTE DEVICE TYPE: NORMAL

## (undated) DEVICE — SHEATH PRELUDE 9X13CM SNAP

## (undated) DEVICE — Device

## (undated) DEVICE — NDL PERCUTANEOUS 21G 7CM VASC

## (undated) DEVICE — PAD GROUNDING DISPER ELECTRODE

## (undated) DEVICE — PAD DEFIB CADENCE ADULT R2

## (undated) DEVICE — KIT SELECTRA ACCESSORY

## (undated) DEVICE — OIL SILICONE SJM

## (undated) DEVICE — GUIDEWIRE WHOLEY HI TORQ 175CM

## (undated) DEVICE — DRESSING AQUACEL AG ADV 3.5X6

## (undated) DEVICE — SUT 3-0 VICRYL / SH (J416)

## (undated) DEVICE — SAFESHEATH II 8FR 13CM

## (undated) DEVICE — ADAPTER CPS SAFESHEATH

## (undated) DEVICE — GUIDEWIRE ANGIO 1.5MM .035X180

## (undated) DEVICE — OMNIPAQUE 300MG 150ML VIAL

## (undated) DEVICE — SUT VICRYL 2-0 CT-2 VCP269H

## (undated) DEVICE — CATH BLLN ATTAIN VENOGRAM

## (undated) DEVICE — GUIDEWIRE WHISPER ES .014X190

## (undated) DEVICE — ADHESIVE DERMABOND ADVANCED

## (undated) DEVICE — GUIDEWIRE STD .035X180CM STR

## (undated) DEVICE — PACK PACER PERMANENT OMC

## (undated) DEVICE — SYR BULB 60CC IRRIGATION

## (undated) DEVICE — CAUTERY BOVIE PENCIL

## (undated) DEVICE — SCALPEL SZ 10 RETRACTABLE

## (undated) DEVICE — SHEATH INTRODUCER 5FR 10CM

## (undated) DEVICE — SUT SILK 2-0 PS 18IN BLACK